# Patient Record
Sex: FEMALE | Race: WHITE | Employment: OTHER | URBAN - METROPOLITAN AREA
[De-identification: names, ages, dates, MRNs, and addresses within clinical notes are randomized per-mention and may not be internally consistent; named-entity substitution may affect disease eponyms.]

---

## 2017-01-05 ENCOUNTER — OFFICE VISIT (OUTPATIENT)
Dept: INTERNAL MEDICINE CLINIC | Age: 72
End: 2017-01-05

## 2017-01-05 VITALS
HEIGHT: 64 IN | HEART RATE: 68 BPM | BODY MASS INDEX: 28.68 KG/M2 | WEIGHT: 168 LBS | TEMPERATURE: 97.9 F | RESPIRATION RATE: 16 BRPM | OXYGEN SATURATION: 98 % | SYSTOLIC BLOOD PRESSURE: 148 MMHG | DIASTOLIC BLOOD PRESSURE: 88 MMHG

## 2017-01-05 DIAGNOSIS — I10 ESSENTIAL HYPERTENSION: Primary | ICD-10-CM

## 2017-01-05 RX ORDER — LOSARTAN POTASSIUM 100 MG/1
100 TABLET ORAL DAILY
Qty: 30 TAB | Refills: 2 | Status: SHIPPED | OUTPATIENT
Start: 2017-01-05 | End: 2017-03-28 | Stop reason: SDUPTHER

## 2017-01-05 NOTE — MR AVS SNAPSHOT
Visit Information Date & Time Provider Department Dept. Phone Encounter #  
 1/5/2017 10:00 AM [facility] [phone] Your Appointments 1/10/2017 11:00 AM  
6 MONTH with Roger Weiss MD  
[facility] Gynecologic On[facility]) Appt Note: 6 month check [phone]  
[phone]  
  
    
 2/7/2017 10:40 AM  
ROUTINE CARE with MD Jagdeep Guevara [facility]) Appt Note: 3m fu  
 [address], [address] [place] [address]  
[address], [phone] Upcoming Health Maintenance Date Due FOBT Q 1 YEAR AGE 50-75 11/21/1997 OSTEOPOROSIS SCREENING (DEXA) 11/21/2012 Pneumococcal 65+ High/Highest Risk (2 of 2 - PCV13) 11/22/2014 MEDICARE YEARLY EXAM 9/25/2016 GLAUCOMA SCREENING Q2Y 1/1/2017 BREAST CANCER SCRN MAMMOGRAM 10/7/2018 DTaP/Tdap/Td series (2 - Td) 8/12/2024 Allergies as of 1/5/2017  Review Complete On: 1/5/2017 By: Suhas Millard NP Severity Noted Reaction Type Reactions Lisinopril Medium 01/05/2017   Side Effect Cough Simvastatin Medium 01/05/2017   Side Effect Myalgia Codeine  11/02/2011   Systemic Other (comments) Unable to function Lidocaine  11/02/2011   Systemic Hives Current Immunizations  Reviewed on 10/13/2016 Name Date Influenza Vaccine 10/8/2016, 9/18/2012 Pneumococcal Polysaccharide (PPSV-23) 11/22/2013 Tdap 8/12/2014 Not reviewed this visit You Were Diagnosed With   
  
 Codes Comments Essential hypertension    -  Primary ICD-10-CM: I10 
ICD-9-CM: 401.9 Vitals BP Pulse Temp Resp Height(growth percentile) Weight(growth percentile) 148/88 68 97.9 °F (36.6 °C) (Oral) 16 5' 4\" (1.626 m) 168 lb (76.2 kg) SpO2 BMI OB Status Smoking Status 98% 28.84 kg/m2 Hysterectomy Never Smoker Vitals History BMI and BSA Data Body Mass Index Body Surface Area  
 28.84 kg/m 2 1.85 m 2 Preferred Pharmacy Pharmacy Name Phone [id number] Meier [phone] Your Updated Medication List  
  
   
This list is accurate as of: 1/5/17 11:08 AM.  Always use your most recent med list.  
  
  
  
  
 CO Q-10 100 mg capsule Generic drug:  co-enzyme Q-10 Take 300 mg by mouth daily. escitalopram oxalate 10 mg tablet Commonly known as:  Shan Barrs TAKE ONE TABLET BY MOUTH DAILY  
  
 * ESTRACE 0.01 % (0.1 mg/gram) vaginal cream  
Generic drug:  estradiol Insert 2 g into vagina every Monday and Friday. * estradiol 1 mg tablet Commonly known as:  ESTRACE  
TAKE ONE TABLET BY MOUTH DAILY FISH OIL CONCENTRATE PO Take  by mouth.  
  
 losartan 100 mg tablet Commonly known as:  COZAAR Take 1 Tab by mouth daily. multivitamin tablet Commonly known as:  ONE A DAY Take 1 tablet by mouth daily. VITAMIN C 500 mg tablet Generic drug:  ascorbic acid (vitamin C) Take  by mouth. * Notice: This list has 2 medication(s) that are the same as other medications prescribed for you. Read the directions carefully, and ask your doctor or other care provider to review them with you. Prescriptions Sent to Pharmacy Refills  
 losartan (COZAAR) 100 mg tablet 2 Sig: Take 1 Tab by mouth daily. Class: Normal  
 Pharmacy: [facility] #: [phone] Route: Oral  
  
Introducing [facility]! Dear Thomas Rivas: Thank you for requesting a [employer] account. Our records indicate that you already have an active [employer] account.   You can access your account anytime at [email or web address] Did you know that you can access your hospital and ER discharge instructions at any time in [employer]? You can also review all of your test results from your hospital stay or ER visit. Additional Information If you have questions, please visit the Frequently Asked Questions section of the [employer] website at [email or web address] Remember, [employer] is NOT to be used for urgent needs. For medical emergencies, dial 911. Now available from your iPhone and Android! Please provide this summary of care documentation to your next provider. Your primary care clinician is listed as Marquez Owen. If you have any questions after today's visit, please call [phone].

## 2017-01-05 NOTE — PROGRESS NOTES
74 yo female reports since being on started on Lisinopril Oct 21, she has had a rash on tops of proximal thighs and upper abd and a dry cough. The rash has resolved with use of only OTC moisturizing lotion. The cough is dry and most prevalent at night. She resumed taking her allergy medications a few days ago. She is careful with salt/sodium. PE: WNWD WF with intermittent dry cough   Repeat BP w/ large cough - 148/88   Heart - RRR   Lungs - clear   Skin - upper anterior thighs - dry skin    Imp: HTN   Possible Side Effects from Lisinopril - cough   Dry Skin    Plan: Stop Lisinopril   Start Losartan 100 mg    F/u with Dr. Cindy Tellez 2/7 as scheduled  ________________________  Expected course of current diagnosed problem(s) as well as expected progression and possible complications, and desired follow up with provider are discussed with patient. Patient is encouraged to be back in touch with any questions or concerns. Patient expresses understanding of plan of care. Patient is given AVS which includes diagnoses, current medications, vitals.

## 2017-01-05 NOTE — PROGRESS NOTES
1. Have you been to the ER, urgent care clinic since your last visit? Hospitalized since your last visit? No    2. Have you seen or consulted any other health care providers outside of the [address] [place] [address] since your last visit? Include any pap smears or colon screening.  No  Chief Complaint   Patient presents with    Hypertension     dry cough from lisinopril     Rash     upper thighs since starting lisinopril

## 2017-01-10 ENCOUNTER — OFFICE VISIT (OUTPATIENT)
Dept: GYNECOLOGY | Age: 72
End: 2017-01-10

## 2017-01-10 VITALS
WEIGHT: 170.6 LBS | SYSTOLIC BLOOD PRESSURE: 176 MMHG | HEIGHT: 64 IN | DIASTOLIC BLOOD PRESSURE: 87 MMHG | BODY MASS INDEX: 29.12 KG/M2 | HEART RATE: 74 BPM

## 2017-01-10 DIAGNOSIS — C56.1 OVARIAN CANCER, RIGHT (HCC): Primary | ICD-10-CM

## 2017-01-10 NOTE — PROGRESS NOTES
[address] [place] [address] [address], [address] [facility]  [phone]  MD Jluis Vale MD  Office Visit    Patient ID:  Name: Luis He  MRN: [id number]  : 1945/71 y.o. Visit date: 1/10/2017    INTERVAL HISTORY:  Luis He is a  female with a diagnosis of stage IC, grade 3, ovarian carcinoma (clear cell). She is s/p surgical resection and has now completed 6 cycles of Taxol and Carboplatin chemotherapy. She tolerated chemotherapy quite well. Her post-treatment PET/CT in 2013 showed no evidence of residual or metastatic disease. She presents today for routine surveillance. She is doing well and is without complaints. She denies any vaginal bleeding or discharge, any pelvic or abdominal pain, or any changes in her bowel or bladder habits. Her most recent CA-125 in 2016 was normal.  Her lastt CT of the abdomen/pelvis in 2015 showed no evidence of disease. PMH:  Past Medical History   Diagnosis Date    Adopted     BRCA negative 2013     Dr. Ivette Vines Cervical arthritis     History of ovarian cancer     Hypercholesterolemia     Hypertension     Other anxiety states     Other ill-defined conditions(189.08) 2010     BOWEL OBSTRUCTION  ? IMPACTION    Unspecified adverse effect of anesthesia      TREMORS AFTERWARDS     PSH:  Past Surgical History   Procedure Laterality Date    Hx cholecystectomy      Hx tonsillectomy       AS CHILD    Hx wisdom teeth extraction      Hx tubal ligation      Hx dilation and curettage      Hx gyn  13     RSO    Hx gyn  13     TLH/LSO, omentectomy, lymph node dissection     SOC:  Social History     Social History    Marital status:      Spouse name: N/A    Number of children: N/A    Years of education: N/A     Occupational History    Banking Retired     Social History Main Topics    Smoking status: Never Smoker    Smokeless tobacco: Never Used    Alcohol use 6.0 oz/week     10 Glasses of wine per week      Comment: 1-2 glasses nightly    Drug use: No    Sexual activity: No     Other Topics Concern    Exercise Yes     walking 1/2 - 1 mile daily     Social History Narrative    . Adopted by great aunt and uncle. Retired from [employer]. Two adult sons, one currently at home with her with his wife and grandchild. Family History  Family History   Problem Relation Age of Onset    Hypertension Father     Stroke Father     Diabetes Father     Parkinsonism Mother     Hypertension Mother     Diabetes Other      Maternal great aunt    Heart Disease Other      Maternal great aunt    Drug Abuse Son      Medications:  Current Outpatient Prescriptions on File Prior to Visit   Medication Sig Dispense Refill    losartan (COZAAR) 100 mg tablet Take 1 Tab by mouth daily. 30 Tab 2    estradiol (ESTRACE) 1 mg tablet TAKE ONE TABLET BY MOUTH DAILY 60 Tab 0    escitalopram oxalate (LEXAPRO) 10 mg tablet TAKE ONE TABLET BY MOUTH DAILY 90 Tab 0    multivitamin (ONE A DAY) tablet Take 1 tablet by mouth daily.  estradiol (ESTRACE) 0.01 % (0.1 mg/gram) vaginal cream Insert 2 g into vagina every Monday and Friday.  ascorbic acid (VITAMIN C) 500 mg tablet Take  by mouth.  co-enzyme Q-10 (CO Q-10) 100 mg capsule Take 300 mg by mouth daily. No current facility-administered medications on file prior to visit. Allergies: Allergies   Allergen Reactions    Lisinopril Cough    Simvastatin Myalgia    Codeine Other (comments)     Unable to function    Lidocaine Hives       ROS:  Negative except for that mentioned above.     OBJECTIVE:    PHYSICAL EXAM  VITAL SIGNS: Visit Vitals    /87 (BP 1 Location: Left arm, BP Patient Position: Sitting)    Pulse 74    Ht 5' 4\" (1.626 m)    Wt 170 lb 9.6 oz (77.4 kg)    BMI 29.28 kg/m2      GENERAL ZOILA: in no apparent distress and well developed and well nourished   HEENT: within normal limits   RESPIRATORY: lungs clear to auscultation and percussion   CARDIOVASC: Regular rate and rhythm without [facility]   GASTROINT: soft, non-tender, without masses or organomegaly   MUSCULOSKEL: no joint tenderness, deformity or swelling   EXTREMITIES: extremities normal, atraumatic, no cyanosis or edema   PELVIC: Normal external genitalia. Normal vagina. Uterus, cervix, and adnexa surgically absent. No masses or nodularity. RECTAL: Exam deferred. JAH SURVEY: Cervical, supraclavicular, and axillary nodes normal.   NEURO: Grossly normal       Lab Results   Component Value Date/Time    WBC 9.9 10/21/2016 12:44 PM    HGB 14.4 10/21/2016 12:44 PM    HCT 43.5 10/21/2016 12:44 PM    PLATELET 774 74/52/6186 12:44 PM    MCV 91 10/21/2016 12:44 PM     Lab Results   Component Value Date/Time    Sodium 138 10/21/2016 12:44 PM    Potassium 4.6 10/21/2016 12:44 PM    Chloride 96 10/21/2016 12:44 PM    CO2 24 10/21/2016 12:44 PM    Anion gap 4 09/25/2014 03:28 PM    Glucose 81 10/21/2016 12:44 PM    BUN 15 10/21/2016 12:44 PM    Creatinine 0.68 10/21/2016 12:44 PM    BUN/Creatinine ratio 22 10/21/2016 12:44 PM    GFR est  10/21/2016 12:44 PM    GFR est non-AA 90 10/21/2016 12:44 PM    Calcium 9.7 10/21/2016 12:44 PM       CT of abdomen/pelvis (7/16/15)  Limited images of the lung bases demonstrate a 3 mm nodule in the right    lower lobe (series 3, image 6) that is stable dating back to least a    December 2009. There is no new lung nodule, mass, or consolidation. The    heart size is normal. There is no pericardial or pleural effusion.     The liver, spleen, pancreas, and adrenal glands are normal. The kidneys are    symmetric without hydronephrosis.  The gall bladder is surgically absent    without intra- or extra-hepatic biliary dilatation.        There are no dilated bowel loops.  The appendix is normal.  There are no    enlarged lymph nodes.  There is no free fluid or free air.     The urinary bladder is normal.  There is no pelvic mass.  The bony    structures are age-appropriate. There is no aggressive blastic or lytic    osseous lesion.          IMPRESSION:    Stable exam with no evidence for disease recurrence or metastatic disease in    the abdomen or pelvis. IMPRESSION AND PLAN:  Dianne Aldrich has a diagnosis of stage IC, grade 3, ovarian CA (clear cell), managed with surgical resection followed by adjuvant Taxol and Carboplatin chemotherapy. She has no evidence of disease based upon today's examination. A pap smear was performed recently by her regular gynecologist and was normal.  We will repeat a CA-125 today. I will plan to see her back in 6 months for continued surveillance.         Peggy Pedro MD  1/10/2017/10:56 AM

## 2017-01-11 LAB — CANCER AG125 SERPL-ACNC: 26.1 U/ML (ref 0–38.1)

## 2017-01-26 RX ORDER — ESTRADIOL 1 MG/1
TABLET ORAL
Qty: 60 TAB | Refills: 0 | Status: SHIPPED | OUTPATIENT
Start: 2017-01-26 | End: 2017-03-22 | Stop reason: SDUPTHER

## 2017-01-27 RX ORDER — LISINOPRIL 10 MG/1
TABLET ORAL
Qty: 30 TAB | Refills: 6 | OUTPATIENT
Start: 2017-01-27

## 2017-02-07 ENCOUNTER — HOSPITAL ENCOUNTER (OUTPATIENT)
Dept: GENERAL RADIOLOGY | Age: 72
Discharge: HOME | End: 2017-02-07
Payer: COMMERCIAL

## 2017-02-07 ENCOUNTER — OFFICE VISIT (OUTPATIENT)
Dept: INTERNAL MEDICINE CLINIC | Age: 72
End: 2017-02-07

## 2017-02-07 VITALS
TEMPERATURE: 97.4 F | HEART RATE: 68 BPM | DIASTOLIC BLOOD PRESSURE: 64 MMHG | HEIGHT: 64 IN | SYSTOLIC BLOOD PRESSURE: 112 MMHG | WEIGHT: 169 LBS | BODY MASS INDEX: 28.85 KG/M2 | RESPIRATION RATE: 16 BRPM | OXYGEN SATURATION: 97 %

## 2017-02-07 DIAGNOSIS — M25.552 BILATERAL HIP PAIN: ICD-10-CM

## 2017-02-07 DIAGNOSIS — E78.00 ELEVATED LDL CHOLESTEROL LEVEL: ICD-10-CM

## 2017-02-07 DIAGNOSIS — G89.29 CHRONIC NECK PAIN: ICD-10-CM

## 2017-02-07 DIAGNOSIS — M25.551 BILATERAL HIP PAIN: ICD-10-CM

## 2017-02-07 DIAGNOSIS — I10 ESSENTIAL HYPERTENSION: Primary | ICD-10-CM

## 2017-02-07 DIAGNOSIS — M54.2 CHRONIC NECK PAIN: ICD-10-CM

## 2017-02-07 DIAGNOSIS — F41.9 ANXIETY: ICD-10-CM

## 2017-02-07 PROCEDURE — 72050 X-RAY EXAM NECK SPINE 4/5VWS: CPT

## 2017-02-07 PROCEDURE — 73521 X-RAY EXAM HIPS BI 2 VIEWS: CPT

## 2017-02-07 RX ORDER — CETIRIZINE HCL 10 MG
10 TABLET ORAL DAILY
COMMUNITY
End: 2018-08-14

## 2017-02-07 NOTE — PROGRESS NOTES
Called pt to review her neck and hip films.  She has DJD as suspected.  She prefers not to see an orthopedic or participate in PT at this time.  I advised more regular exercise to prevent stiffness.

## 2017-02-07 NOTE — PROGRESS NOTES
HPI:  Najma Troy is a 73y.o. year old female who returns to clinic today for routine follow up appointment to discuss the issues below:    Here for routine f/u of anxiety and htn. Since last visit she was initiated on antihypertensive by Ash Jones.  Had cough with lisinopril but has tolerated losartan well. She reports her anxiety as under good control on Lexapro. She recently found out her 79 yo biological mother has Parkinson's and associated dementia. Her biological Father had a stroke in his 62s. Her biological sister is well. Najma Troy would like her cholesterol rechecked today, but historically couldn't tolerate a statin. She generally eats a heart healthy diet. Breakfast - cereal  Lunch - sandwich or salad  Dinner - cooks at home, out on weekends. She is not exercising due to pain in her joints - hips, neck, shoulders, base of thumbs  Neck is most significant. She has no upper ext weakness or pain in arms  No h/o prior w/u or evaluation. Was taking ibuprofen a couple times per day until she noted her high blood pressure readings last Fall. She prefers to avoid new medication as she typically doesn't do well with new meds. Prior to Admission medications    Medication Sig Start Date End Date Taking? Authorizing Provider   cetirizine (ZYRTEC) 10 mg tablet Take  by mouth. Yes Historical Provider   estradiol (ESTRACE) 1 mg tablet TAKE ONE TABLET BY MOUTH DAILY 1/26/17  Yes Idella Hammans., MD   losartan (COZAAR) 100 mg tablet Take 1 Tab by mouth daily. 1/5/17  Yes Jody Regan NP   escitalopram oxalate (LEXAPRO) 10 mg tablet TAKE ONE TABLET BY MOUTH DAILY 11/17/16  Yes [address] [place] [address] Street, MD   multivitamin (ONE A DAY) tablet Take 1 tablet by mouth daily. Yes Historical Provider   estradiol (ESTRACE) 0.01 % (0.1 mg/gram) vaginal cream Insert 2 g into vagina every Monday and Friday. Yes Historical Provider   ascorbic acid (VITAMIN C) 500 mg tablet Take  by mouth.    Yes Historical Provider   co-enzyme Q-10 (CO Q-10) 100 mg capsule Take 300 mg by mouth daily. Yes Historical Provider          Allergies   Allergen Reactions    Lisinopril Cough    Simvastatin Myalgia    Codeine Other (comments)     Unable to function    Lidocaine Hives           Review of Systems   Constitutional: Negative for chills, fever and malaise/fatigue. HENT: Negative for congestion. Respiratory: Negative for cough, shortness of breath and wheezing. Cardiovascular: Negative for chest pain, palpitations and leg swelling. Gastrointestinal: Negative for abdominal pain, blood in stool and heartburn. Musculoskeletal: Positive for joint pain and neck pain. Negative for falls and myalgias. Neurological: Negative for dizziness and headaches. Physical Exam   Constitutional: She appears well-nourished. Neck: Carotid bruit is not present. Cardiovascular: Normal rate, regular rhythm and normal heart sounds. No murmur heard. Pulses:       Carotid pulses are 2+ on the right side, and 2+ on the left side. Pulmonary/Chest: Effort normal and breath sounds normal.   Abdominal: Soft. Bowel sounds are normal. There is no hepatosplenomegaly. There is no tenderness. Musculoskeletal: She exhibits no edema. Right hip: She exhibits normal range of motion and no deformity. Left hip: She exhibits normal range of motion and no deformity. Cervical back: She exhibits pain. She exhibits normal range of motion and no spasm. Legs:  Psychiatric: She has a normal mood and affect. Her behavior is normal.         Visit Vitals    /64 (BP 1 Location: Left arm, BP Patient Position: Sitting)    Pulse 68    Temp 97.4 °F (36.3 °C) (Oral)    Resp 16    Ht 5' 4\" (1.626 m)    Wt 169 lb (76.7 kg)    SpO2 97%    BMI 29.01 kg/m2         Assessment & Plan:  Jumana Livingston was seen today for hypertension and joint pain.     Diagnoses and all orders for this visit:    Essential hypertension  I evaluated and recommended to continue current doses of medications.      Elevated LDL cholesterol level  Recheck fasting lipids. Continue lifestyle management.   -     LIPID PANEL    Anxiety  I evaluated and recommended to continue current doses of Lexapro     Chronic neck pain  -     XR SPINE CERV 4 OR 5 V; Future  Bilateral hip pain  -     XR HIP LT W OR WO PELV 2-3 VWS; Future  -     XR HIP RT W OR WO PELV 2-3 VWS; Future    Discussed pathophysiology of suspected osteoarthritis. Recommend Tylenol as initial therapy with use of no more than 3,000 mg per day. Advised she start with one extra strength tablet twice daily as needed. Encouraged her to increase her activity level to promote joint mobility and reduce stiffness- discussed activities such as swimming (she avoids pools) and the stationary bike. Ok to use ibuprofen on a prn basis while closely monitoring her blood pressure. Follow-up Disposition:  Return in about 6 months (around 8/7/2017), or if symptoms worsen or fail to improve. Advised her to call back or return to office if symptoms worsen/change/persist.  Discussed expected course/resolution/complications of diagnosis in detail with patient. Medication risks/benefits/costs/interactions/alternatives discussed with patient. She was given an after visit summary which includes diagnoses, current medications, & vitals. She expressed understanding with the diagnosis and plan.

## 2017-02-07 NOTE — PROGRESS NOTES
Chief Complaint   Patient presents with    Hypertension    Joint Pain     gotten worse in last 6 months      1. Have you been to the ER, urgent care clinic since your last visit? Hospitalized since your last visit? No    2. Have you seen or consulted any other health care providers outside of the [address] [place] [address] since your last visit? Include any pap smears or colon screening.  No

## 2017-02-07 NOTE — PATIENT INSTRUCTIONS
Arthritis: Care Instructions  Your Care Instructions  Arthritis, also called osteoarthritis, is a breakdown of the cartilage that cushions your joints. When the cartilage wears down, your bones rub against each other. This causes pain and stiffness. Many people have some arthritis as they age. Arthritis most often affects the joints of the spine, hands, hips, knees, or feet. You can take simple measures to protect your joints, ease your pain, and help you stay active. Follow-up care is a key part of your treatment and safety. Be sure to make and go to all appointments, and call your doctor if you are having problems. It's also a good idea to know your test results and keep a list of the medicines you take. How can you care for yourself at home? · Stay at a healthy weight. Being overweight puts extra strain on your joints. · Talk to your doctor or physical therapist about exercises that will help ease joint pain. ¨ Stretch. You may enjoy gentle forms of yoga to help keep your joints and muscles flexible. ¨ Walk instead of jog. Other types of exercise that are less stressful on the joints include riding a bicycle, swimming, or water exercise. ¨ Lift weights. Strong muscles help reduce stress on your joints. Stronger thigh muscles, for example, take some of the stress off of the knees and hips. Learn the right way to lift weights so you do not make joint pain worse. · Take your medicines exactly as prescribed. Call your doctor if you think you are having a problem with your medicine. · Take pain medicines exactly as directed. ¨ If the doctor gave you a prescription medicine for pain, take it as prescribed. ¨ If you are not taking a prescription pain medicine, ask your doctor if you can take an over-the-counter medicine. · Use a cane, crutch, walker, or another device if you need help to get around. These can help rest your joints.  You also can use other things to make life easier, such as a higher toilet seat and padded handles on kitchen utensils. · Do not sit in low chairs, which can make it hard to get up. · Put heat or cold on your sore joints as needed. Use whichever helps you most. You also can take turns with hot and cold packs. ¨ Apply heat 2 or 3 times a day for 20 to 30 minutes--using a heating pad, hot shower, or hot pack--to relieve pain and stiffness. ¨ Put ice or a cold pack on your sore joint for 10 to 20 minutes at a time. Put a thin cloth between the ice and your skin. When should you call for help? Call your doctor now or seek immediate medical care if:  · You have sudden swelling, warmth, or pain in any joint. · You have joint pain and a fever or rash. · You have such bad pain that you cannot use a joint. Watch closely for changes in your health, and be sure to contact your doctor if:  · You have mild joint symptoms that continue even with more than 6 weeks of care at home. · You have stomach pain or other problems with your medicine. Where can you learn more? Go to http://iglesia-khoi.info/. Enter N882 in the search box to learn more about \"Arthritis: Care Instructions. \"  Current as of: February 24, 2016  Content Version: 11.1  © 0338-2392 [employer]. Care instructions adapted under license by [employer] (which disclaims liability or warranty for this information). If you have questions about a medical condition or this instruction, always ask your healthcare professional. [place] [address] any warranty or liability for your use of this information.

## 2017-02-07 NOTE — MR AVS SNAPSHOT
Visit Information Date & Time Provider Department Dept. Phone Encounter #  
 2/7/2017 10:40 AM MD [facility] [phone] Follow-up Instructions Return in about 6 months (around 8/7/2017), or if symptoms worsen or fail to improve. Your Appointments 7/11/2017 10:15 AM  
6 MONTH with Cherry Barakat MD  
[facility] Gynecologic Oncology [address]) Appt Note: 6 month check [address] [phone] [id number] Upcoming Health Maintenance Date Due FOBT Q 1 YEAR AGE 50-75 11/21/1997 OSTEOPOROSIS SCREENING (DEXA) 11/21/2012 Pneumococcal 65+ High/Highest Risk (2 of 2 - PCV13) 11/22/2014 MEDICARE YEARLY EXAM 9/25/2016 GLAUCOMA SCREENING Q2Y 1/1/2017 PAP AKA CERVICAL CYTOLOGY 6/14/2017 BREAST CANCER SCRN MAMMOGRAM 10/7/2018 DTaP/Tdap/Td series (2 - Td) 8/12/2024 Allergies as of 2/7/2017  Review Complete On: 2/7/2017 By: [address], MD  
  
 Severity Noted Reaction Type Reactions Lisinopril Medium 01/05/2017   Side Effect Cough Simvastatin Medium 01/05/2017   Side Effect Myalgia Codeine  11/02/2011   Systemic Other (comments) Unable to function Lidocaine  11/02/2011   Systemic Hives Current Immunizations  Reviewed on 10/13/2016 Name Date Influenza Vaccine 10/8/2016, 9/18/2012 Pneumococcal Polysaccharide (PPSV-23) 11/22/2013 Tdap 8/12/2014 Not reviewed this visit You Were Diagnosed With   
  
 Codes Comments Essential hypertension    -  Primary ICD-10-CM: I10 
ICD-9-CM: 401.9 Elevated LDL cholesterol level     ICD-10-CM: E78.00 ICD-9-CM: 272.0 Chronic neck pain     ICD-10-CM: M54.2, G89.29 ICD-9-CM: 723.1, 338.29 Bilateral hip pain     ICD-10-CM: M25.551, M25.552 ICD-9-CM: 719.45 Anxiety     ICD-10-CM: F41.9 ICD-9-CM: 300.00 Vitals BP Pulse Temp Resp Height(growth percentile) Weight(growth percentile) 112/64 (BP 1 Location: Left arm, BP Patient Position: Sitting) 68 97.4 °F (36.3 °C) (Oral) 16 5' 4\" (1.626 m) 169 lb (76.7 kg) SpO2 BMI OB Status Smoking Status 97% 29.01 kg/m2 Hysterectomy Never Smoker Vitals History BMI and BSA Data Body Mass Index Body Surface Area  
 29.01 kg/m 2 1.86 m 2 Preferred Pharmacy Pharmacy Name Phone [id number] [phone] Your Updated Medication List  
  
   
This list is accurate as of: 2/7/17 12:08 PM.  Always use your most recent med list.  
  
  
  
  
 cetirizine 10 mg tablet Commonly known as:  ZYRTEC Take  by mouth. CO Q-10 100 mg capsule Generic drug:  co-enzyme Q-10 Take 300 mg by mouth daily. escitalopram oxalate 10 mg tablet Commonly known as:  Wilbert Heir TAKE ONE TABLET BY MOUTH DAILY  
  
 * ESTRACE 0.01 % (0.1 mg/gram) vaginal cream  
Generic drug:  estradiol Insert 2 g into vagina every Monday and Friday. * estradiol 1 mg tablet Commonly known as:  ESTRACE  
TAKE ONE TABLET BY MOUTH DAILY losartan 100 mg tablet Commonly known as:  COZAAR Take 1 Tab by mouth daily. multivitamin tablet Commonly known as:  ONE A DAY Take 1 tablet by mouth daily. VITAMIN C 500 mg tablet Generic drug:  ascorbic acid (vitamin C) Take  by mouth. * Notice: This list has 2 medication(s) that are the same as other medications prescribed for you. Read the directions carefully, and ask your doctor or other care provider to review them with you. We Performed the Following LIPID PANEL [20964 CPT(R)] Follow-up Instructions Return in about 6 months (around 8/7/2017), or if symptoms worsen or fail to improve. To-Do List   
 02/07/2017   Imaging:  XR HIP RT W OR WO PELV 2-3 VWS   
  
 02/08/2017 Imaging:  XR HIP LT W OR WO PELV 2-3 VWS   
  
 02/08/2017 Imaging:  XR SPINE CERV 4 OR 5 V Patient Instructions Arthritis: Care Instructions Your Care Instructions Arthritis, also called osteoarthritis, is a breakdown of the cartilage that cushions your joints. When the cartilage wears down, your bones rub against each other. This causes pain and stiffness. Many people have some arthritis as they age. Arthritis most often affects the joints of the spine, hands, hips, knees, or feet. You can take simple measures to protect your joints, ease your pain, and help you stay active. Follow-up care is a key part of your treatment and safety. Be sure to make and go to all appointments, and call your doctor if you are having problems. It's also a good idea to know your test results and keep a list of the medicines you take. How can you care for yourself at home? · Stay at a healthy weight. Being overweight puts extra strain on your joints. · Talk to your doctor or physical therapist about exercises that will help ease joint pain. ¨ Stretch. You may enjoy gentle forms of yoga to help keep your joints and muscles flexible. ¨ Walk instead of jog. Other types of exercise that are less stressful on the joints include riding a bicycle, swimming, or water exercise. ¨ Lift weights. Strong muscles help reduce stress on your joints. Stronger thigh muscles, for example, take some of the stress off of the knees and hips. Learn the right way to lift weights so you do not make joint pain worse. · Take your medicines exactly as prescribed. Call your doctor if you think you are having a problem with your medicine. · Take pain medicines exactly as directed. ¨ If the doctor gave you a prescription medicine for pain, take it as prescribed. ¨ If you are not taking a prescription pain medicine, ask your doctor if you can take an over-the-counter medicine. · Use a cane, crutch, walker, or another device if you need help to get around. These can help rest your joints. You also can use other things to make life easier, such as a higher toilet seat and padded handles on kitchen utensils. · Do not sit in low chairs, which can make it hard to get up. · Put heat or cold on your sore joints as needed. Use whichever helps you most. You also can take turns with hot and cold packs. ¨ Apply heat 2 or 3 times a day for 20 to 30 minutesusing a heating pad, hot shower, or hot packto relieve pain and stiffness. ¨ Put ice or a cold pack on your sore joint for 10 to 20 minutes at a time. Put a thin cloth between the ice and your skin. When should you call for help? Call your doctor now or seek immediate medical care if: 
· You have sudden swelling, warmth, or pain in any joint. · You have joint pain and a fever or rash. · You have such bad pain that you cannot use a joint. Watch closely for changes in your health, and be sure to contact your doctor if: 
· You have mild joint symptoms that continue even with more than 6 weeks of care at home. · You have stomach pain or other problems with your medicine. Where can you learn more? Go to http://iglesia-khoi.info/. Enter Q387 in the search box to learn more about \"Arthritis: Care Instructions. \" Current as of: February 24, 2016 Content Version: 11.1 © 7360-3470 [employer]. Care instructions adapted under license by [employer] (which disclaims liability or warranty for this information). If you have questions about a medical condition or this instruction, always ask your healthcare professional. [address] any warranty or liability for your use of this information. Introducing [facility] [place] [facility]! Dear Mark Moreno: Thank you for requesting a [employer] account. Our records indicate that you already have an active [employer] account.   You can access your account anytime at [email or web address] Did you know that you can access your hospital and ER discharge instructions at any time in [employer]? You can also review all of your test results from your hospital stay or ER visit. Additional Information If you have questions, please visit the Frequently Asked Questions section of the [employer] website at [email or web address] Remember, D[employer]ios is NOT to be used for urgent needs. For medical emergencies, dial 911. Now available from your iPhone and Android! Please provide this summary of care documentation to your next provider. Your primary care clinician is listed as [address]. If you have any questions after today's visit, please call [phone].

## 2017-02-08 LAB
CHOLEST SERPL-MCNC: 245 MG/DL (ref 100–199)
HDLC SERPL-MCNC: 81 MG/DL
INTERPRETATION, 910389: NORMAL
LDLC SERPL CALC-MCNC: 130 MG/DL (ref 0–99)
TRIGL SERPL-MCNC: 169 MG/DL (ref 0–149)
VLDLC SERPL CALC-MCNC: 34 MG/DL (ref 5–40)

## 2017-02-13 ENCOUNTER — DOCUMENTATION ONLY (OUTPATIENT)
Dept: GYNECOLOGY | Age: 72
End: 2017-02-13

## 2017-02-13 ENCOUNTER — TELEPHONE (OUTPATIENT)
Dept: GYNECOLOGY | Age: 72
End: 2017-02-13

## 2017-02-13 DIAGNOSIS — C56.1 MALIGNANT NEOPLASM OF RIGHT OVARY (HCC): Primary | ICD-10-CM

## 2017-02-13 NOTE — TELEPHONE ENCOUNTER
Pt would like order for CA-125 sent to [facility] at [facility]. Order faxed as requested.

## 2017-02-14 NOTE — PROGRESS NOTES
The following Online Dealer message was sent to patient:    Your LDL cholesterol remains elevated and your triglyceride levels are now elevated as well. Some notes about cholesterol below. Based on your numbers a cholesterol lowering medication should be considered. I know that you had side effects with simvastatin in the past.  Traditionally, simvastatin tends to cause the most problems. If you have not tried others and are willing to, please let me know.

## 2017-02-15 LAB — CANCER AG125 SERPL-ACNC: 13.9 U/ML (ref 0–38.1)

## 2017-02-16 DIAGNOSIS — E78.2 HYPERCHOLESTEROLEMIA WITH HYPERTRIGLYCERIDEMIA: Primary | ICD-10-CM

## 2017-02-16 RX ORDER — PRAVASTATIN SODIUM 20 MG/1
20 TABLET ORAL
Qty: 90 TAB | Refills: 0 | Status: SHIPPED | OUTPATIENT
Start: 2017-02-16 | End: 2017-02-24 | Stop reason: SDUPTHER

## 2017-02-24 RX ORDER — PRAVASTATIN SODIUM 20 MG/1
20 TABLET ORAL
Qty: 90 TAB | Refills: 0 | Status: SHIPPED | OUTPATIENT
Start: 2017-02-24 | End: 2017-05-15 | Stop reason: SDUPTHER

## 2017-03-22 RX ORDER — ESTRADIOL 1 MG/1
TABLET ORAL
Qty: 60 TAB | Refills: 0 | Status: SHIPPED | OUTPATIENT
Start: 2017-03-22 | End: 2017-05-31 | Stop reason: SDUPTHER

## 2017-03-28 DIAGNOSIS — I10 ESSENTIAL HYPERTENSION: ICD-10-CM

## 2017-03-28 RX ORDER — LOSARTAN POTASSIUM 100 MG/1
100 TABLET ORAL DAILY
Qty: 30 TAB | Refills: 5 | Status: SHIPPED | OUTPATIENT
Start: 2017-03-28 | End: 2017-08-31 | Stop reason: SDUPTHER

## 2017-03-28 NOTE — TELEPHONE ENCOUNTER
----- Message from Janet Veronica sent at 3/28/2017  1:22 PM EDT -----  Regarding: BRANDON Ricardo  Pt needs a refill on Losartan 100 mg call into [facility] [phone]

## 2017-03-28 NOTE — TELEPHONE ENCOUNTER
Last office visit 2/7/17  No upcomming appointments on file.  Patient not due for follow up until 8/7/17

## 2017-05-11 DIAGNOSIS — E78.2 HYPERCHOLESTEROLEMIA WITH HYPERTRIGLYCERIDEMIA: ICD-10-CM

## 2017-05-15 RX ORDER — PRAVASTATIN SODIUM 20 MG/1
TABLET ORAL
Qty: 90 TAB | Refills: 1 | Status: SHIPPED | OUTPATIENT
Start: 2017-05-15 | End: 2017-11-13 | Stop reason: SDUPTHER

## 2017-05-31 RX ORDER — ESTRADIOL 1 MG/1
TABLET ORAL
Qty: 30 TAB | Refills: 0 | Status: SHIPPED | OUTPATIENT
Start: 2017-05-31 | End: 2017-06-26 | Stop reason: SDUPTHER

## 2017-07-11 ENCOUNTER — OFFICE VISIT (OUTPATIENT)
Dept: GYNECOLOGY | Age: 72
End: 2017-07-11

## 2017-07-11 VITALS
HEART RATE: 62 BPM | SYSTOLIC BLOOD PRESSURE: 160 MMHG | BODY MASS INDEX: 29.57 KG/M2 | HEIGHT: 64 IN | WEIGHT: 173.2 LBS | DIASTOLIC BLOOD PRESSURE: 85 MMHG

## 2017-07-11 DIAGNOSIS — C56.1 OVARIAN CANCER, RIGHT (HCC): Primary | ICD-10-CM

## 2017-07-11 NOTE — PROGRESS NOTES
Six month check up, Patient states no abnormal spotting or bleeding. Patient states no questions or concerns for today's visit.

## 2017-07-11 NOTE — PROGRESS NOTES
[address] [place] [address] Mathias Moritz [address]18, [address] [facility]  [phone]  MD Alis Velazquez MD  Office Visit    Patient ID:  Name: Garth Ness  MRN: [id number]  : 1945/71 y.o. Visit date: 2017    INTERVAL HISTORY:  Garth Ness is a  female with a diagnosis of stage IC, grade 3, ovarian carcinoma (clear cell). She is s/p surgical resection and has now completed 6 cycles of Taxol and Carboplatin chemotherapy. She tolerated chemotherapy quite well. Her post-treatment PET/CT in 2013 showed no evidence of residual or metastatic disease. She presents today for routine surveillance. She is doing well and is without complaints. She denies any vaginal bleeding or discharge, any pelvic or abdominal pain, or any changes in her bowel or bladder habits. Her most recent CA-125 in 2017 was normal.  Her last CT of the abdomen/pelvis in 2015 showed no evidence of disease. PMH:  Past Medical History:   Diagnosis Date    Adopted     BRCA negative 2013    Dr. Carson Robison Cervical arthritis [facility])     History of ovarian cancer     Hypercholesterolemia     Hypertension     Other anxiety states     Other ill-defined conditions 2010    BOWEL OBSTRUCTION  ? IMPACTION    Unspecified adverse effect of anesthesia     TREMORS AFTERWARDS     PSH:  Past Surgical History:   Procedure Laterality Date    HX CHOLECYSTECTOMY      HX DILATION AND CURETTAGE      HX GYN  13    RSO    HX GYN  13    TLH/LSO, omentectomy, lymph node dissection    HX TONSILLECTOMY      AS CHILD    HX TUBAL LIGATION      HX WISDOM TEETH EXTRACTION       SOC:  Social History     Social History    Marital status:      Spouse name: N/A    Number of children: N/A    Years of education: N/A     Occupational History    Banking Retired     Social History Main Topics    Smoking status: Never Smoker    Smokeless tobacco: Never Used    Alcohol use 6.0 oz/week     10 Glasses of wine per week      Comment: 1-2 glasses nightly    Drug use: No    Sexual activity: No     Other Topics Concern    Exercise Yes     walking 1/2 - 1 mile daily     Social History Narrative    . Adopted by great aunt and uncle. Retired from [employer]. Two adult sons, one currently at home with her with his wife and grandchild. Family History  Family History   Problem Relation Age of Onset    Hypertension Father     Stroke Father     Diabetes Father     Parkinsonism Mother     Hypertension Mother     Diabetes Other      Maternal great aunt    Heart Disease Other      Maternal great aunt    Drug Abuse Son      Medications:  Current Outpatient Prescriptions on File Prior to Visit   Medication Sig Dispense Refill    estradiol (ESTRACE) 1 mg tablet TAKE ONE TABLET BY MOUTH DAILY 30 Tab 12    pravastatin (PRAVACHOL) 20 mg tablet TAKE ONE TABLET BY M0UTH EVERY NIGHT 90 Tab 1    losartan (COZAAR) 100 mg tablet Take 1 Tab by mouth daily. 30 Tab 5    escitalopram oxalate (LEXAPRO) 10 mg tablet TAKE 1 TABLET EVERY DAY 90 Tab 3    cetirizine (ZYRTEC) 10 mg tablet Take  by mouth.  multivitamin (ONE A DAY) tablet Take 1 tablet by mouth daily.  estradiol (ESTRACE) 0.01 % (0.1 mg/gram) vaginal cream Insert 2 g into vagina every Monday and Friday.  ascorbic acid (VITAMIN C) 500 mg tablet Take  by mouth.  co-enzyme Q-10 (CO Q-10) 100 mg capsule Take 300 mg by mouth daily. No current facility-administered medications on file prior to visit. Allergies: Allergies   Allergen Reactions    Lisinopril Cough    Simvastatin Myalgia    Codeine Other (comments)     Unable to function    Lidocaine Hives       ROS:  Negative except for that mentioned above.     OBJECTIVE:    PHYSICAL EXAM  VITAL SIGNS: Visit Vitals    /85 (BP 1 Location: Left arm, BP Patient Position: Sitting)    Pulse 62    Ht 5' 4.02\" (1.626 m)    Wt 78.6 kg (173 lb 3.2 oz)    BMI 29.71 kg/m2      GENERAL ZOILA: in no apparent distress and well developed and well nourished   HEENT: within normal limits   RESPIRATORY: lungs clear to auscultation and percussion   CARDIOVASC: Regular rate and rhythm without [facility]   GASTROINT: soft, non-tender, without masses or organomegaly   MUSCULOSKEL: no joint tenderness, deformity or swelling   EXTREMITIES: extremities normal, atraumatic, no cyanosis or edema   PELVIC: Normal external genitalia. Normal vagina. Uterus, cervix, and adnexa surgically absent. No masses or nodularity. RECTAL: Exam deferred. JAH SURVEY: Cervical, supraclavicular, and axillary nodes normal.   NEURO: Grossly normal       Lab Results   Component Value Date/Time    WBC 9.9 10/21/2016 12:44 PM    HGB 14.4 10/21/2016 12:44 PM    HCT 43.5 10/21/2016 12:44 PM    PLATELET 212 92/22/8564 12:44 PM    MCV 91 10/21/2016 12:44 PM     Lab Results   Component Value Date/Time    Sodium 138 10/21/2016 12:44 PM    Potassium 4.6 10/21/2016 12:44 PM    Chloride 96 10/21/2016 12:44 PM    CO2 24 10/21/2016 12:44 PM    Anion gap 4 09/25/2014 03:28 PM    Glucose 81 10/21/2016 12:44 PM    BUN 15 10/21/2016 12:44 PM    Creatinine 0.68 10/21/2016 12:44 PM    BUN/Creatinine ratio 22 10/21/2016 12:44 PM    GFR est  10/21/2016 12:44 PM    GFR est non-AA 90 10/21/2016 12:44 PM    Calcium 9.7 10/21/2016 12:44 PM       CT of abdomen/pelvis (7/16/15)  Limited images of the lung bases demonstrate a 3 mm nodule in the right    lower lobe (series 3, image 6) that is stable dating back to least a    December 2009. There is no new lung nodule, mass, or consolidation. The    heart size is normal. There is no pericardial or pleural effusion.     The liver, spleen, pancreas, and adrenal glands are normal. The kidneys are    symmetric without hydronephrosis.  The gall bladder is surgically absent    without intra- or extra-hepatic biliary dilatation.        There are no dilated bowel loops.  The appendix is normal.  There are no    enlarged lymph nodes.  There is no free fluid or free air.     The urinary bladder is normal.  There is no pelvic mass.  The bony    structures are age-appropriate. There is no aggressive blastic or lytic    osseous lesion.          IMPRESSION:    Stable exam with no evidence for disease recurrence or metastatic disease in    the abdomen or pelvis. IMPRESSION AND PLAN:  Ton Patel has a diagnosis of stage IC, grade 3, ovarian CA (clear cell), managed with surgical resection followed by adjuvant Taxol and Carboplatin chemotherapy. She has no evidence of disease based upon today's examination. I will plan to see her back in 6 months for continued surveillance.         Gloria Gomez MD  7/11/2017/10:56 AM

## 2017-07-12 LAB
ALBUMIN SERPL-MCNC: 4.3 G/DL (ref 3.6–4.8)
ALBUMIN/GLOB SERPL: 1.4 {RATIO} (ref 1.2–2.2)
ALP SERPL-CCNC: 75 IU/L (ref 39–117)
ALT SERPL-CCNC: 16 IU/L (ref 0–32)
AST SERPL-CCNC: 22 IU/L (ref 0–40)
BASOPHILS # BLD AUTO: 0.1 X10E3/UL (ref 0–0.2)
BASOPHILS NFR BLD AUTO: 1 %
BILIRUB SERPL-MCNC: 0.4 MG/DL (ref 0–1.2)
BUN SERPL-MCNC: 19 MG/DL (ref 8–27)
BUN/CREAT SERPL: 28 (ref 12–28)
CALCIUM SERPL-MCNC: 9.6 MG/DL (ref 8.7–10.3)
CANCER AG125 SERPL-ACNC: 12.2 U/ML (ref 0–38.1)
CHLORIDE SERPL-SCNC: 97 MMOL/L (ref 96–106)
CO2 SERPL-SCNC: 26 MMOL/L (ref 18–29)
CREAT SERPL-MCNC: 0.69 MG/DL (ref 0.57–1)
EOSINOPHIL # BLD AUTO: 0.3 X10E3/UL (ref 0–0.4)
EOSINOPHIL NFR BLD AUTO: 3 %
ERYTHROCYTE [DISTWIDTH] IN BLOOD BY AUTOMATED COUNT: 13.8 % (ref 12.3–15.4)
GLOBULIN SER CALC-MCNC: 3 G/DL (ref 1.5–4.5)
GLUCOSE SERPL-MCNC: 84 MG/DL (ref 65–99)
HCT VFR BLD AUTO: 41.8 % (ref 34–46.6)
HGB BLD-MCNC: 13.8 G/DL (ref 11.1–15.9)
IMM GRANULOCYTES # BLD: 0 X10E3/UL (ref 0–0.1)
IMM GRANULOCYTES NFR BLD: 0 %
LYMPHOCYTES # BLD AUTO: 2.7 X10E3/UL (ref 0.7–3.1)
LYMPHOCYTES NFR BLD AUTO: 26 %
MCH RBC QN AUTO: 30.5 PG (ref 26.6–33)
MCHC RBC AUTO-ENTMCNC: 33 G/DL (ref 31.5–35.7)
MCV RBC AUTO: 93 FL (ref 79–97)
MONOCYTES # BLD AUTO: 1 X10E3/UL (ref 0.1–0.9)
MONOCYTES NFR BLD AUTO: 10 %
NEUTROPHILS # BLD AUTO: 6.2 X10E3/UL (ref 1.4–7)
NEUTROPHILS NFR BLD AUTO: 60 %
PLATELET # BLD AUTO: 382 X10E3/UL (ref 150–379)
POTASSIUM SERPL-SCNC: 4.7 MMOL/L (ref 3.5–5.2)
PROT SERPL-MCNC: 7.3 G/DL (ref 6–8.5)
RBC # BLD AUTO: 4.52 X10E6/UL (ref 3.77–5.28)
SODIUM SERPL-SCNC: 139 MMOL/L (ref 134–144)
WBC # BLD AUTO: 10.2 X10E3/UL (ref 3.4–10.8)

## 2017-08-02 ENCOUNTER — HOSPITAL ENCOUNTER (OUTPATIENT)
Age: 72
Setting detail: OUTPATIENT SURGERY
Discharge: HOME | End: 2017-08-02
Attending: INTERNAL MEDICINE | Admitting: INTERNAL MEDICINE
Payer: COMMERCIAL

## 2017-08-02 ENCOUNTER — ANESTHESIA (OUTPATIENT)
Dept: ENDOSCOPY | Age: 72
End: 2017-08-02
Payer: COMMERCIAL

## 2017-08-02 ENCOUNTER — ANESTHESIA EVENT (OUTPATIENT)
Dept: ENDOSCOPY | Age: 72
End: 2017-08-02
Payer: COMMERCIAL

## 2017-08-02 VITALS
OXYGEN SATURATION: 99 % | TEMPERATURE: 97.9 F | HEART RATE: 60 BPM | DIASTOLIC BLOOD PRESSURE: 81 MMHG | SYSTOLIC BLOOD PRESSURE: 167 MMHG | RESPIRATION RATE: 13 BRPM

## 2017-08-02 PROCEDURE — 74011250636 HC RX REV CODE- 250/636

## 2017-08-02 PROCEDURE — 76060000031 HC ANESTHESIA FIRST 0.5 HR: Performed by: INTERNAL MEDICINE

## 2017-08-02 PROCEDURE — 77030027957 HC TBNG IRR ENDOGTR BUSS -B: Performed by: INTERNAL MEDICINE

## 2017-08-02 PROCEDURE — 76040000019: Performed by: INTERNAL MEDICINE

## 2017-08-02 RX ORDER — SODIUM CHLORIDE 0.9 % (FLUSH) 0.9 %
5-10 SYRINGE (ML) INJECTION EVERY 8 HOURS
Status: DISCONTINUED | OUTPATIENT
Start: 2017-08-02 | End: 2017-08-02 | Stop reason: HOSPADM

## 2017-08-02 RX ORDER — MIDAZOLAM HYDROCHLORIDE 1 MG/ML
.25-5 INJECTION, SOLUTION INTRAMUSCULAR; INTRAVENOUS
Status: DISCONTINUED | OUTPATIENT
Start: 2017-08-02 | End: 2017-08-02 | Stop reason: HOSPADM

## 2017-08-02 RX ORDER — DEXTROMETHORPHAN/PSEUDOEPHED 2.5-7.5/.8
1.2 DROPS ORAL
Status: DISCONTINUED | OUTPATIENT
Start: 2017-08-02 | End: 2017-08-02 | Stop reason: HOSPADM

## 2017-08-02 RX ORDER — SODIUM CHLORIDE 9 MG/ML
INJECTION, SOLUTION INTRAVENOUS
Status: DISCONTINUED | OUTPATIENT
Start: 2017-08-02 | End: 2017-08-02 | Stop reason: HOSPADM

## 2017-08-02 RX ORDER — EPINEPHRINE 0.1 MG/ML
1 INJECTION INTRACARDIAC; INTRAVENOUS
Status: DISCONTINUED | OUTPATIENT
Start: 2017-08-02 | End: 2017-08-02 | Stop reason: HOSPADM

## 2017-08-02 RX ORDER — SODIUM CHLORIDE 9 MG/ML
50 INJECTION, SOLUTION INTRAVENOUS CONTINUOUS
Status: DISCONTINUED | OUTPATIENT
Start: 2017-08-02 | End: 2017-08-02 | Stop reason: HOSPADM

## 2017-08-02 RX ORDER — FENTANYL CITRATE 50 UG/ML
100 INJECTION, SOLUTION INTRAMUSCULAR; INTRAVENOUS
Status: DISCONTINUED | OUTPATIENT
Start: 2017-08-02 | End: 2017-08-02 | Stop reason: HOSPADM

## 2017-08-02 RX ORDER — SODIUM CHLORIDE 0.9 % (FLUSH) 0.9 %
5-10 SYRINGE (ML) INJECTION AS NEEDED
Status: DISCONTINUED | OUTPATIENT
Start: 2017-08-02 | End: 2017-08-02 | Stop reason: HOSPADM

## 2017-08-02 RX ORDER — NALOXONE HYDROCHLORIDE 0.4 MG/ML
0.4 INJECTION, SOLUTION INTRAMUSCULAR; INTRAVENOUS; SUBCUTANEOUS
Status: DISCONTINUED | OUTPATIENT
Start: 2017-08-02 | End: 2017-08-02 | Stop reason: HOSPADM

## 2017-08-02 RX ORDER — FLUMAZENIL 0.1 MG/ML
0.2 INJECTION INTRAVENOUS
Status: DISCONTINUED | OUTPATIENT
Start: 2017-08-02 | End: 2017-08-02 | Stop reason: HOSPADM

## 2017-08-02 RX ORDER — ATROPINE SULFATE 0.1 MG/ML
0.5 INJECTION INTRAVENOUS
Status: DISCONTINUED | OUTPATIENT
Start: 2017-08-02 | End: 2017-08-02 | Stop reason: HOSPADM

## 2017-08-02 RX ORDER — PROPOFOL 10 MG/ML
INJECTION, EMULSION INTRAVENOUS AS NEEDED
Status: DISCONTINUED | OUTPATIENT
Start: 2017-08-02 | End: 2017-08-02 | Stop reason: HOSPADM

## 2017-08-02 RX ADMIN — PROPOFOL 60 MG: 10 INJECTION, EMULSION INTRAVENOUS at 08:34

## 2017-08-02 RX ADMIN — SODIUM CHLORIDE: 9 INJECTION, SOLUTION INTRAVENOUS at 08:32

## 2017-08-02 RX ADMIN — PROPOFOL 40 MG: 10 INJECTION, EMULSION INTRAVENOUS at 08:35

## 2017-08-02 RX ADMIN — PROPOFOL 10 MG: 10 INJECTION, EMULSION INTRAVENOUS at 08:36

## 2017-08-02 RX ADMIN — PROPOFOL 70 MG: 10 INJECTION, EMULSION INTRAVENOUS at 08:37

## 2017-08-02 RX ADMIN — PROPOFOL 70 MG: 10 INJECTION, EMULSION INTRAVENOUS at 08:38

## 2017-08-02 NOTE — H&P
[address] [facility] [address] [place] [address], [address] [facility]                 Edgar Alcala is a  73 y.o.  female who presents with no symptoms for screening. .        Past Medical History:   Diagnosis Date    Adopted     Adverse effect of anesthesia     tremors and shaking after anesthesia/\"taks silly\"    BRCA negative 5/2013    Dr. Nati Alberto Cervical arthritis [facility])     History of ovarian cancer 2013    surgery/chemotherapy    Hypercholesterolemia     Hypertension     Ill-defined condition     hx passing out from dehydration    Other anxiety states     Other ill-defined conditions 2010    BOWEL OBSTRUCTION  ? IMPACTION    Unspecified adverse effect of anesthesia     TREMORS AFTERWARDS     Past Surgical History:   Procedure Laterality Date    HX CHOLECYSTECTOMY  2000    HX DILATION AND CURETTAGE      HX GYN  1/11/13    RSO    HX GYN  2/13/13    TLH/LSO, omentectomy, lymph node dissection    HX TONSILLECTOMY      AS CHILD    HX TUBAL LIGATION  1978    HX VASCULAR ACCESS      placed and removed    HX WISDOM TEETH EXTRACTION       Allergies   Allergen Reactions    Lisinopril Cough    Simvastatin Myalgia    Codeine Other (comments)     Unable to function    Lidocaine Hives     Current Facility-Administered Medications   Medication Dose Route Frequency Provider Last Rate Last Dose    0.9% sodium chloride infusion  50 mL/hr IntraVENous CONTINUOUS Tori Heredia MD        sodium chloride (NS) flush 5-10 mL  5-10 mL IntraVENous Q8H Tori Heredia MD        sodium chloride (NS) flush 5-10 mL  5-10 mL IntraVENous PRN Tori Heredia MD        midazolam (VERSED) injection 0.25-5 mg  0.25-5 mg IntraVENous Irina Heredia MD        fentaNYL citrate (PF) injection 100 mcg  100 mcg IntraVENous Irina Heredia MD        naloxone [facility]) injection 0.4 mg  0.4 mg IntraVENous Irina Heredia MD        flumazenil (ROMAZICON) 0.1 mg/mL injection 0.2 mg  0.2 mg IntraVENous Irina Clifford MD        [facility]) 37EX/1.1WW oral drops 80 mg  1.2 mL Oral Irina Clifford MD        atropine injection 0.5 mg  0.5 mg IntraVENous ONCE PRN Fahad Clifford MD        EPINEPHrine (ADRENALIN) 0.1 mg/mL syringe 1 mg  1 mg Endoscopically ONCE PRN Fahad Clifford MD           Visit Vitals    /75    Pulse 65    Temp 97.9 °F (36.6 °C)    Resp 16    SpO2 97%           PHYSICAL EXAM:  General: WD, WN. Alert, cooperative, no acute distress    HEENT: NC, Atraumatic. PERRLA, EOMI. Anicteric sclerae. Mallampati score 2  Lungs:  CTA Bilaterally. No Wheezing/Rhonchi/Rales. Heart:  Regular  rhythm,  No murmur (), No Rubs, No Gallops  Abdomen: Soft, Non distended, Non tender.  +Bowel sounds, no HSM  Extremities: No c/c/e  Neurologic:  CN 2-12 gi, Alert and oriented X 3. No acute neurological distress   Psych:   Good insight. Not anxious nor agitated. Plan:   Endoscopic procedure with MACChen Clifford MD  8/2/2017  8:30 AM

## 2017-08-02 NOTE — PROGRESS NOTES

## 2017-08-02 NOTE — ANESTHESIA PREPROCEDURE EVALUATION
Anesthetic History               Review of Systems / Medical History  Patient summary reviewed, nursing notes reviewed and pertinent labs reviewed    Pulmonary                   Neuro/Psych              Cardiovascular    Hypertension              Exercise tolerance: >4 METS     GI/Hepatic/Renal               Comments: screening Endo/Other        Arthritis     Other Findings              Physical Exam    Airway  Mallampati: II  TM Distance: > 6 cm  Neck ROM: normal range of motion   Mouth opening: Normal     Cardiovascular    Rhythm: regular  Rate: normal         Dental  No notable dental hx       Pulmonary  Breath sounds clear to auscultation               Abdominal         Other Findings            Anesthetic Plan    ASA: 2  Anesthesia type: MAC          Induction: Intravenous  Anesthetic plan and risks discussed with: Patient

## 2017-08-02 NOTE — ROUTINE PROCESS
Garth Ness  1945  [id number]    Situation:  Verbal report received from: Katja Forman RN  Procedure: Procedure(s):  COLONOSCOPY    Background:    Preoperative diagnosis: REPEAT SCREENING   Postoperative diagnosis: 1. normal colonoscopy    :  Dr. Kallie Drummond  Assistant(s): Endoscopy Technician-1: Ashley Arenas  Endoscopy RN-1: Courtenay Lefort, RN    Specimens: * No specimens in log *  H. Pylori  no    Assessment:  Intra-procedure medications     Anesthesia gave intra-procedure sedation and medications, see anesthesia flow sheet yes    Intravenous fluids: NS@ KVO     Vital signs stable     Abdominal assessment: round and soft     Recommendation:  Discharge patient per MD order. Family or Friend   Permission to share finding with family or friend yes    Endoscopy discharge instructions have been reviewed and given to patient and spouse. The patient and spouse verbalized understanding and acceptance of instructions.

## 2017-08-02 NOTE — DISCHARGE INSTRUCTIONS
[address], [facility] [place] Bruno Kaplan  [id number]  1945    COLON DISCHARGE INSTRUCTIONS    DISCOMFORT:  Redness at IV site- apply warm compress to area; if redness or soreness persist- contact your physician  There may be a slight amount of blood passed from the rectum  Gaseous discomfort- walking, belching will help relieve any discomfort  You may not operate a vehicle for 12 hours  You may not engage in an occupation involving machinery or appliances for rest of today  You may not drink alcoholic beverages for at least 12 hours  Avoid making any critical decisions for at least 24 hour  DIET:   Regular diet. - however -  remember your colon is empty and a heavy meal will produce gas. Avoid these foods:  vegetables, fried / greasy foods, carbonated drinks for today         ACTIVITY:  You may resume your normal daily activities it is recommended that you spend the remainder of the day resting -  avoid any strenuous activity. CALL M.D. ANY SIGN OF:   Increasing pain, nausea, vomiting  Abdominal distension (swelling)  New increased bleeding (oral or rectal)  Fever (chills)  Pain in chest area  Bloody discharge from nose or mouth  Shortness of breath     Follow-up Instructions:   Call Dr. Gary Petersen for any questions or problems.    Telephone # [phone]  Should have a repeat colonoscopy in 10 years    Impression:  1. normal colonoscopy

## 2017-08-02 NOTE — IP AVS SNAPSHOT
[address] 
[phone] Patient: Leslie Valderrama MRN: [id number] :1945 You are allergic to the following Allergen Reactions Lisinopril Cough Simvastatin Myalgia Codeine Other (comments) Unable to function Lidocaine Hives Recent Documentation OB Status Smoking Status Hysterectomy Never Smoker Emergency Contacts Name Discharge Info Relation Home Work Mobile [employer] Pack  Spouse [3] [phone] About your hospitalization You were admitted on:  2017 You last received care in the:  [facility] ENDOSCOPY You were discharged on:  2017 Unit phone number:  [phone] Why you were hospitalized Your primary diagnosis was:  Not on File Providers Seen During Your Hospitalizations Provider Role Specialty Primary office phone Jyoti Gale MD Attending Provider Gastroenterology [phone] Your Primary Care Physician (PCP) Primary Care Physician Office Phone Office Fax Ravindraceasar Ayesha [phone] Follow-up Information None Your Appointments 2017  9:15 AM EDT  
COMPLETE PHYSICAL with MD Jagdeep Crawford [facility]) [address], [address] 596 [facility] [place]  
[phone] Current Discharge Medication List  
  
CONTINUE these medications which have NOT CHANGED Dose & Instructions Dispensing Information Comments Morning Noon Evening Bedtime CENTRUM SILVER PO Your last dose was: Your next dose is: Take  by mouth. Takes one po once daily. Refills:  0  
     
   
   
   
  
 cetirizine 10 mg tablet Commonly known as:  ZYRTEC Your last dose was:     
   
Your next dose is:    
   
   
 Dose:  10 mg  
 Take 10 mg by mouth daily. Refills:  0  
     
   
   
   
  
 CO Q-10 300 mg Cap Generic drug:  coenzyme q10 Your last dose was: Your next dose is:    
   
   
 Dose:  300 mg Take 300 mg by mouth daily. Refills:  0  
     
   
   
   
  
 escitalopram oxalate 10 mg tablet Commonly known as:  Shakir Goes Your last dose was: Your next dose is: TAKE 1 TABLET EVERY DAY Quantity:  90 Tab Refills:  3  
     
   
   
   
  
 * ESTRACE 0.01 % (0.1 mg/gram) vaginal cream  
Generic drug:  estradiol Your last dose was: Your next dose is:    
   
   
 Dose:  1 g Insert 1 g into vagina two (2) times a week. SUN and WED Refills:  0  
     
   
   
   
  
 * estradiol 1 mg tablet Commonly known as:  ESTRACE Your last dose was: Your next dose is: TAKE ONE TABLET BY MOUTH DAILY Quantity:  30 Tab Refills:  12 FISH OIL PO Your last dose was: Your next dose is:    
   
   
 Dose:  1200 mg Take 1,200 mg by mouth daily. Refills:  0  
     
   
   
   
  
 losartan 100 mg tablet Commonly known as:  COZAAR Your last dose was: Your next dose is:    
   
   
 Dose:  100 mg Take 1 Tab by mouth daily. Quantity:  30 Tab Refills:  5  
     
   
   
   
  
 pravastatin 20 mg tablet Commonly known as:  PRAVACHOL Your last dose was: Your next dose is: TAKE ONE TABLET BY M0UTH EVERY NIGHT Quantity:  90 Tab Refills:  1 VITAMIN C 500 mg tablet Generic drug:  ascorbic acid (vitamin C) Your last dose was: Your next dose is:    
   
   
 Dose:  250 mg Take 250 mg by mouth daily. Refills:  0  
     
   
   
   
  
 * Notice: This list has 2 medication(s) that are the same as other medications prescribed for you.  Read the directions carefully, and ask your doctor or other care provider to review them with you. Discharge Instructions [address], [address] [facility] [place] [id number] 
1945 COLON DISCHARGE INSTRUCTIONS DISCOMFORT: 
Redness at IV site- apply warm compress to area; if redness or soreness persist- contact your physician There may be a slight amount of blood passed from the rectum Gaseous discomfort- walking, belching will help relieve any discomfort You may not operate a vehicle for 12 hours You may not engage in an occupation involving machinery or appliances for rest of today You may not drink alcoholic beverages for at least 12 hours Avoid making any critical decisions for at least 24 hour DIET: 
 Regular diet.  however -  remember your colon is empty and a heavy meal will produce gas. Avoid these foods:  vegetables, fried / greasy foods, carbonated drinks for today ACTIVITY: 
You may resume your normal daily activities it is recommended that you spend the remainder of the day resting -  avoid any strenuous activity. CALL M.D. ANY SIGN OF: Increasing pain, nausea, vomiting Abdominal distension (swelling) New increased bleeding (oral or rectal) Fever (chills) Pain in chest area Bloody discharge from nose or mouth Shortness of breath Follow-up Instructions: 
 Call Dr. Horacio Armando for any questions or problems. Telephone # [phone] Should have a repeat colonoscopy in 10 years Impression:  1. normal colonoscopy Discharge Orders None Introducing [facility]! Dear Danni Milton: Thank you for requesting a Kaliki account. Our records indicate that you already have an active Kaliki account. You can access your account anytime at [email or web address] Did you know that you can access your hospital and ER discharge instructions at any time in Kaliki?   You can also review all of your test results from your hospital stay or ER visit. Additional Information If you have questions, please visit the Frequently Asked Questions section of the [employer] website at [email or web address] Remember, MyChart is NOT to be used for urgent needs. For medical emergencies, dial 911. Now available from your iPhone and Android! General Information Please provide this summary of care documentation to your next provider. Patient Signature:  ____________________________________________________________ Date:  ____________________________________________________________  
  
[facility] Provider Signature:  ____________________________________________________________ Date:  ____________________________________________________________

## 2017-08-02 NOTE — ANESTHESIA POSTPROCEDURE EVALUATION
Post-Anesthesia Evaluation and Assessment    Patient: Eda Jackson MRN: [id number]  SSN: [id number]    YOB: 1945  Age: 73 y.o. Sex: female       Cardiovascular Function/Vital Signs  Visit Vitals    /73    Pulse 63    Temp 36.6 °C (97.9 °F)    Resp 17    SpO2 96%       Patient is status post MAC anesthesia for Procedure(s):  COLONOSCOPY. Nausea/Vomiting: None    Postoperative hydration reviewed and adequate. Pain:  Pain Scale 1: Numeric (0 - 10) (08/02/17 0855)  Pain Intensity 1: 0 (08/02/17 0855)   Managed    Neurological Status: At baseline    Mental Status and Level of Consciousness: Arousable    Pulmonary Status:   O2 Device: Room air (08/02/17 0855)   Adequate oxygenation and airway patent    Complications related to anesthesia: None    Post-anesthesia assessment completed.  No concerns    Signed By: Marilyn Lucero MD     August 2, 2017

## 2017-08-02 NOTE — PROCEDURES
[address]  [address]              :  Ana Maria Jorgensen MD    Referring Provider: Thalia Ott MD    Sedation:  MAC anesthesia Propofol        Prior to the procedure its objectives, risks, consequences and alternatives were discussed with the patient who then elected to proceed. The patient had the opportunity to ask questions and those questions were answered. A physical exam was performed. The heart, lungs, and mental status were examined prior to the procedure and found to be satisfactory for conscious sedation and for the procedure. Conscious sedation was initiated by the physician. Continuous pulse oximetry and blood pressure monitoring were used throughout the procedure. After appropriate analgesia and rectal exam, the colonoscope was passed into the anus and passed to the cecum without difficulty. The prep was good. On slow withdrawal of the scope, the cecum, ascending colon, hepatic flexure, transverse colon, splenic flexure, descending colon, sigmoid and rectum were normal. Retroflexion exam of the rectum was normal. She tolerated the procedure without complication and I recommend a repeat colonoscopy in 10 years. Specimen none    Complications: None. EBL:  None.     Ana Maria Jorgensen MD  8/2/2017  8:48 AM

## 2017-08-04 LAB
CHOLEST SERPL-MCNC: 220 MG/DL (ref 100–199)
HDLC SERPL-MCNC: 89 MG/DL
INTERPRETATION, 910389: NORMAL
LDLC SERPL CALC-MCNC: 106 MG/DL (ref 0–99)
TRIGL SERPL-MCNC: 126 MG/DL (ref 0–149)
VLDLC SERPL CALC-MCNC: 25 MG/DL (ref 5–40)

## 2017-08-08 LAB
ALBUMIN SERPL-MCNC: 4.2 G/DL (ref 3.6–4.8)
ALBUMIN/GLOB SERPL: 1.4 {RATIO} (ref 1.2–2.2)
ALP SERPL-CCNC: 72 IU/L (ref 39–117)
ALT SERPL-CCNC: 20 IU/L (ref 0–32)
AST SERPL-CCNC: 23 IU/L (ref 0–40)
BILIRUB SERPL-MCNC: <0.2 MG/DL (ref 0–1.2)
BUN SERPL-MCNC: 12 MG/DL (ref 8–27)
BUN/CREAT SERPL: 18 (ref 12–28)
CALCIUM SERPL-MCNC: 9.3 MG/DL (ref 8.7–10.3)
CHLORIDE SERPL-SCNC: 100 MMOL/L (ref 96–106)
CO2 SERPL-SCNC: 20 MMOL/L (ref 18–29)
CREAT SERPL-MCNC: 0.68 MG/DL (ref 0.57–1)
GLOBULIN SER CALC-MCNC: 3 G/DL (ref 1.5–4.5)
GLUCOSE SERPL-MCNC: 83 MG/DL (ref 65–99)
POTASSIUM SERPL-SCNC: 4.7 MMOL/L (ref 3.5–5.2)
PROT SERPL-MCNC: 7.2 G/DL (ref 6–8.5)
SODIUM SERPL-SCNC: 140 MMOL/L (ref 134–144)
SPECIMEN STATUS REPORT, ROLRST: NORMAL
TSH SERPL DL<=0.005 MIU/L-ACNC: 0.86 UIU/ML (ref 0.45–4.5)

## 2017-08-11 ENCOUNTER — OFFICE VISIT (OUTPATIENT)
Dept: INTERNAL MEDICINE CLINIC | Age: 72
End: 2017-08-11

## 2017-08-11 VITALS
HEIGHT: 64 IN | TEMPERATURE: 97.4 F | BODY MASS INDEX: 29.19 KG/M2 | HEART RATE: 71 BPM | SYSTOLIC BLOOD PRESSURE: 122 MMHG | RESPIRATION RATE: 18 BRPM | OXYGEN SATURATION: 96 % | DIASTOLIC BLOOD PRESSURE: 66 MMHG | WEIGHT: 171 LBS

## 2017-08-11 DIAGNOSIS — Z00.00 ROUTINE GENERAL MEDICAL EXAMINATION AT A HEALTH CARE FACILITY: Primary | ICD-10-CM

## 2017-08-11 DIAGNOSIS — I10 ESSENTIAL HYPERTENSION: ICD-10-CM

## 2017-08-11 DIAGNOSIS — C56.1 OVARIAN CANCER, RIGHT (HCC): ICD-10-CM

## 2017-08-11 DIAGNOSIS — F41.9 ANXIETY: ICD-10-CM

## 2017-08-11 DIAGNOSIS — M85.89 OSTEOPENIA OF MULTIPLE SITES: ICD-10-CM

## 2017-08-11 DIAGNOSIS — E78.2 HYPERCHOLESTEROLEMIA WITH HYPERTRIGLYCERIDEMIA: ICD-10-CM

## 2017-08-11 NOTE — PROGRESS NOTES
Establish Care and Complete Physical       HPI:  Simona Polanco is a 73y.o. year old female who is here to establish care. She  had her medical care:    [facility]    She reports the following history and medical concerns:      Cholesterol- runs in family. Father had massive stroke at 77. Recently started on pravachol 20 mg. . No problems with it. Myalgia with simvastatin. HTN- Losartan 100 mg. 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 [place] really bad and cold sweats. 2 weeks ago. After she ate. No chest pain. HRT- went off for 3 months (worst joint pain when she was off it. Hips and shoulder joints)  Had xray joints- nothing major. Clear cell carcinoma- ovary. GYN restarted it. Stress incontinence since surgery. Stage I ovarian cancer but clear cell. Anxiety- lexapro 10 mg. Always tired. Assessment and Plan        1. Routine general medical examination at a health care facility  Well exam.  Some fatigue since ovarian cancer surgery. Following GYN. 2. Essential hypertension  Tolerating medication. Denies dizziness that is positional, SOB, or chest pain. Understands the importance of compliance to reduce risk of future heart failure. Agreed to call if any of above symptoms develop and  stay on current regimen of  losartan    3. Ovarian cancer, right ([address]  As per GYN. Patient feels well. Clear cell type. On HRT. 4. Hypercholesterolemia with hypertriglyceridemia  The nature of cardiac risk has been fully discussed with this patient. I have made her aware of her LDL target goal given her cardiovascular risk analysis. I have discussed the appropriate diet. The need for lifelong compliance in order to reduce risk is stressed. A regular exercise program is recommended to help achieve and maintain normal body weight, fitness and improve lipid balance. The risks and benefits of medications were discussed.   Advised to have Omega 3 Fish Oil 1000 mg as a supplement. Last cholesterol labs reviewed with patient. Patient made aware to get liver checked every 6 months. Continue with  pravachol 20 mg    5. Osteopenia of multiple sites  Recheck DXA. Pt on HRT with GYN.  - DEXA BONE DENSITY STUDY AXIAL; Future    6. Anxiety  Reviewed side effects of medication and states anxiety is stable. No impulsive thoughts, sleep is not affected by medication. Exercise helps reduce anxiety. No GI symptoms. Pt instructed to call if above symptoms and agreed to stay on   lexapro            Visit Vitals    /66 (BP 1 Location: Left arm, BP Patient Position: Sitting)    Pulse 71    Temp 97.4 °F (36.3 °C) (Oral)    Resp 18    Ht 5' 4\" (1.626 m)    Wt 171 lb (77.6 kg)    SpO2 96%    BMI 29.35 kg/m2       Historical Data    Past Medical History:   Diagnosis Date    Adopted     Adverse effect of anesthesia     tremors and shaking after anesthesia/\"taks silly\"    BRCA negative 5/2013    Dr. Sonny Bennett Cervical [facility])     History of ovarian cancer 2013    surgery/chemotherapy    Hypercholesterolemia     Hypertension     Ill-defined condition     hx passing out from dehydration    Other anxiety states     Other ill-defined conditions 2010    BOWEL OBSTRUCTION  ? IMPACTION    Unspecified adverse effect of anesthesia     TREMORS AFTERWARDS       Past Surgical History:   Procedure Laterality Date    COLONOSCOPY N/A 8/2/2017    COLONOS[facility]a [facility] ENDOSCOPY    HX CHOLECYSTECTOMY  2000    HX DILATION AND CURETTAGE      HX GYN  1/11/13    RSO    HX GYN  2/13/13    TLH/LSO, omentectomy, lymph node dissection    HX TONSILLECTOMY      AS CHILD    HX TUBAL LIGATION  1978    HX VASCULAR ACCESS      placed and removed    HX WISDOM TEETH EXTRACTION         Outpatient Encounter Prescriptions as of 8/11/2017   Medication Sig Dispense Refill    pneumococcal 13 daysi conj dip (PREVNAR-13) 0.5 mL syrg injection 0.5 mL by IntraMUSCular route once for 1 dose. 0.5 mL 0    varicella zoster vacine live (ZOSTAVAX) 19,400 unit/0.65 mL susr injection 1 Vial by SubCUTAneous route once for 1 dose. 2.74 mL 0    FOLIC ACID/MULTIVIT-MIN/LUTEIN (CENTRUM SILVER PO) Take  by mouth. Takes one po once daily.  coenzyme q10 (CO Q-10) 300 mg cap Take 300 mg by mouth daily.  DOCOSAHEXANOIC ACID/EPA (FISH OIL PO) Take 1,200 mg by mouth daily.  estradiol (ESTRACE) 1 mg tablet TAKE ONE TABLET BY MOUTH DAILY 30 Tab 12    pravastatin (PRAVACHOL) 20 mg tablet TAKE ONE TABLET BY M0UTH EVERY NIGHT 90 Tab 1    losartan (COZAAR) 100 mg tablet Take 1 Tab by mouth daily. 30 Tab 5    escitalopram oxalate (LEXAPRO) 10 mg tablet TAKE 1 TABLET EVERY DAY 90 Tab 3    estradiol (ESTRACE) 0.01 % (0.1 mg/gram) vaginal cream Insert 1 g into vagina two (2) times a week. SUN and WED      ascorbic acid (VITAMIN C) 500 mg tablet Take 250 mg by mouth daily.  cetirizine (ZYRTEC) 10 mg tablet Take 10 mg by mouth daily. No facility-administered encounter medications on file as of 8/11/2017. Allergies   Allergen Reactions    Simvastatin Myalgia    Codeine Other (comments)     Unable to function    Lidocaine Hives        Social History     Social History    Marital status:      Spouse name: N/A    Number of children: N/A    Years of education: N/A     Occupational History    Banking Retired     Social History Main Topics    Smoking status: Never Smoker    Smokeless tobacco: Never Used    Alcohol use 6.0 oz/week     10 Glasses of wine per week      Comment: 1-2 glasses nightly    Drug use: No    Sexual activity: No     Other Topics Concern    Exercise Yes     walking 1/2 - 1 mile daily     Social History Narrative    . Adopted by great aunt and uncle. Retired from banking industry. Two adult sons, one currently at home with her with his wife and grandchild.           family history includes Dementia in her mother; Diabetes in an other family member; Drug Abuse in her son; Heart Attack in an other family member; Heart Disease in an other family member; Hypertension in her father and mother; Parkinsonism in her mother; Stroke in her father. Review of Systems   Constitutional: Negative for chills, diaphoresis, fever, malaise/fatigue and weight loss. HENT: Negative for hearing loss. Respiratory: Negative for cough. Cardiovascular: Negative for chest pain. Gastrointestinal: Negative for blood in stool and constipation. Genitourinary: Negative for dysuria, flank pain, frequency and urgency. Musculoskeletal: Negative for myalgias. Skin: Negative for rash. Neurological: Negative for dizziness, weakness and headaches. Endo/Heme/Allergies: Does not bruise/bleed easily. Physical Exam   Constitutional: She is oriented to person, place, and time. She appears well-developed and well-nourished. No distress. HENT:   Mouth/Throat: No oropharyngeal exudate. Eyes: Conjunctivae are normal. Pupils are equal, round, and reactive to light. Left eye exhibits no discharge. No scleral icterus. Neck: No thyromegaly present. Cardiovascular: Normal rate, regular rhythm and normal heart sounds. Exam reveals no gallop and no friction rub. No murmur heard. Pulmonary/Chest: No respiratory distress. Abdominal: She exhibits no distension. Musculoskeletal: Normal range of motion. She exhibits no edema or deformity. Lymphadenopathy:     She has no cervical adenopathy. Neurological: She is alert and oriented to person, place, and time. Skin: Skin is warm and dry. No erythema. Psychiatric: She has a normal mood and affect. Judgment and thought content normal.   Nursing note and vitals reviewed.      Ortho Exam       Orders Placed This Encounter    DEXA BONE DENSITY STUDY AXIAL     Standing Status:   Future     Standing Expiration Date:   9/11/2018     Order Specific Question:   Reason for Exam Answer:   Osteopenia    pneumococcal 13 daysi conj dip (PREVNAR-13) 0.5 mL syrg injection     Si.5 mL by IntraMUSCular route once for 1 dose. Dispense:  0.5 mL     Refill:  0    varicella zoster vacine live (ZOSTAVAX) 19,400 unit/0.65 mL susr injection     Si Vial by SubCUTAneous route once for 1 dose. Dispense:  0.65 mL     Refill:  0        I have reviewed the patient's medical history in detail and updated the computerized patient record. We had a prolonged discussion about these complex clinical issues and went over the various important aspects to consider. All questions were answered. Advised her to call back or return to office if symptoms do not improve, change in nature, or persist.    She was given an after visit summary or informed of [employer] Access which includes patient instructions, diagnoses, current medications, & vitals. She expressed understanding with the diagnosis and plan.

## 2017-08-11 NOTE — PATIENT INSTRUCTIONS
Omron BP machine (arm) plug in    Exercise 1:  The 4-7-8 (or Relaxing Breath) Exercise  This exercise is utterly simple, takes almost no time, requires no equipment and can be done anywhere. Although you can do the exercise in any position, sit with your back straight while learning the exercise. Place the tip of your tongue against the ridge of tissue just behind your upper front teeth, and keep it there through the entire exercise. You will be exhaling through your mouth around your tongue; try pursing your lips slightly if this seems awkward.  Exhale completely through your mouth, making a whoosh sound.  Close your mouth and inhale quietly through your nose to a mental count of four.  Hold your breath for a count of seven.  Exhale completely through your mouth, making a whoosh sound to a count of eight.  This is one breath. Now inhale again and repeat the cycle three more times for a total of four breaths. Note that you always inhale quietly through your nose and exhale audibly through your mouth. The tip of your tongue stays in position the whole time. Exhalation takes twice as long as inhalation. The absolute time you spend on each phase is not important; the ratio of 4:7:8 is important. If you have trouble holding your breath, speed the exercise up but keep to the ratio of 4:7:8 for the three phases. With practice you can slow it all down and get used to inhaling and exhaling more and more deeply. This exercise is a natural tranquilizer for the nervous system. Unlike tranquilizing drugs, which are often effective when you first take them but then lose their power over time, this exercise is subtle when you first try it but gains in power with repetition and practice. Do it at least twice a day. You cannot do it too frequently. Do not do more than four breaths at one time for the first month of practice. Later, if you wish, you can extend it to eight breaths.  If you feel a little lightheaded when you first breathe this way, do not be concerned; it will pass. Once you develop this technique by practicing it every day, it will be a very useful tool that you will always have with you. Use it whenever anything upsetting happens - before you react. Use it whenever you are aware of internal tension. Use it to help you fall asleep. This exercise cannot be recommended too highly. Everyone can benefit from it. Exercise 2:  Breath Counting  If you want to get a feel for this challenging work, try your hand at breath counting, a deceptively simple technique much used in [address] practice. Sit in a comfortable position with the spine straight and head inclined slightly forward. Gently close your eyes and take a few deep breaths. Then let the breath come naturally without trying to influence it. Ideally it will be quiet and slow, but depth and rhythm may vary.  To begin the exercise, count \"one\" to yourself as you exhale.  The next time you exhale, count \"two,\" and so on up to [place]. \"   Then begin a new cycle, counting \"one\" on the next exhalation. Never count higher than \"five,\" and count only when you exhale. You will know your attention has wandered when you find yourself up to \"eight,\" \"12,\" even \"19. \"  Try to do 10 minutes of this form of meditation.      Taken from Maycol Boswell MD [facility] of [id number] [employer] breathing exercises on [employer] and you will see a video

## 2017-08-11 NOTE — MR AVS SNAPSHOT
Visit Information Date & Time Provider Department Dept. Phone Encounter #  
 8/11/2017  9:15 AM Thania Alves MD [place] [id number] [facility] [phone] [id number] Follow-up Instructions Return in about 6 months (around 2/11/2018) for Follow up. Your Appointments 1/16/2018 11:00 AM  
6 MONTH with Elise Mckenzie MD  
[facility] Gynecologic Oncology [address] [facility]) Appt Note: 6mo [address] [facility] [address] [id number]  
[address] Upcoming Health Maintenance Date Due FOBT Q 1 YEAR AGE 50-75 11/21/1997 OSTEOPOROSIS SCREENING (DEXA) 11/21/2012 Pneumococcal 65+ High/Highest Risk (2 of 2 - PCV13) 11/22/2014 MEDICARE YEARLY EXAM 9/25/2016 GLAUCOMA SCREENING Q2Y 1/1/2017 PAP AKA CERVICAL CYTOLOGY 6/14/2017 INFLUENZA AGE 9 TO ADULT 8/1/2017 BREAST CANCER SCRN MAMMOGRAM 10/7/2018 DTaP/Tdap/Td series (2 - Td) 8/12/2024 Allergies as of 8/11/2017  Review Complete On: 8/11/2017 By: Thania Alves MD  
  
 Severity Noted Reaction Type Reactions Simvastatin Medium 01/05/2017   Side Effect Myalgia Codeine  11/02/2011   Systemic Other (comments) Unable to function Lidocaine  11/02/2011   Systemic Hives Current Immunizations  Reviewed on 10/13/2016 Name Date Influenza Vaccine 10/8/2016, 9/18/2012 Pneumococcal Polysaccharide (PPSV-23) 11/22/2013 Tdap 8/12/2014 Not reviewed this visit You Were Diagnosed With   
  
 Codes Comments Routine general medical examination at a health care facility    -  Primary ICD-10-CM: Z00.00 ICD-9-CM: V70.0 Essential hypertension     ICD-10-CM: I10 
ICD-9-CM: 401.9 Ovarian cancer, right ([address]     ICD-10-CM: C56.1 ICD-9-CM: 183.0 Hypercholesterolemia with hypertriglyceridemia     ICD-10-CM: E78.2 ICD-9-CM: 272.2  Osteopenia of multiple sites     ICD-10-CM: M85.89 
 ICD-9-CM: 733.90 Vitals BP Pulse Temp Resp Height(growth percentile) Weight(growth percentile) 122/66 (BP 1 Location: Left arm, BP Patient Position: Sitting) 71 97.4 °F (36.3 °C) (Oral) 18 5' 4\" (1.626 m) 171 lb (77.6 kg) SpO2 BMI OB Status Smoking Status 96% 29.35 kg/m2 Hysterectomy Never Smoker BMI and BSA Data Body Mass Index Body Surface Area  
 29.35 kg/m 2 1.87 m 2 Preferred Pharmacy Pharmacy Name Phone [id number] [address], [address] [phone] Your Updated Medication List  
  
   
This list is accurate as of: 8/11/17 10:07 AM.  Always use your most recent med list.  
  
  
  
  
 CENTRUM SILVER PO Take  by mouth. Takes one po once daily. cetirizine 10 mg tablet Commonly known as:  ZYRTEC Take 10 mg by mouth daily. CO Q-10 300 mg Cap Generic drug:  coenzyme q10 Take 300 mg by mouth daily. escitalopram oxalate 10 mg tablet Commonly known as:  Celesta Prost TAKE 1 TABLET EVERY DAY  
  
 * ESTRACE 0.01 % (0.1 mg/gram) vaginal cream  
Generic drug:  estradiol Insert 1 g into vagina two (2) times a week. SUN and WED * estradiol 1 mg tablet Commonly known as:  ESTRACE  
TAKE ONE TABLET BY MOUTH DAILY FISH OIL PO Take 1,200 mg by mouth daily. losartan 100 mg tablet Commonly known as:  COZAAR Take 1 Tab by mouth daily. pneumococcal 13 daysi conj dip 0.5 mL Syrg injection Commonly known as:  PREVNAR-13  
0.5 mL by IntraMUSCular route once for 1 dose. pravastatin 20 mg tablet Commonly known as:  PRAVACHOL  
TAKE ONE TABLET BY M0UTH EVERY NIGHT  
  
 varicella zoster vacine live 19,400 unit/0.65 mL Susr injection Commonly known as:  ZOSTAVAX  
1 Vial by SubCUTAneous route once for 1 dose. VITAMIN C 500 mg tablet Generic drug:  ascorbic acid (vitamin C) Take 250 mg by mouth daily. * Notice: This list has 2 medication(s) that are the same as other medications prescribed for you. Read the directions carefully, and ask your doctor or other care provider to review them with you. Prescriptions Printed Refills  
 pneumococcal 13 daysi conj dip (PREVNAR-13) 0.5 mL syrg injection 0 Si.5 mL by IntraMUSCular route once for 1 dose. Class: Print Route: IntraMUSCular  
 varicella zoster vacine live (ZOSTAVAX) 19,400 unit/0.65 mL susr injection 0 Si Vial by SubCUTAneous route once for 1 dose. Class: Print Route: SubCUTAneous Follow-up Instructions Return in about 6 months (around 2018) for Follow up. To-Do List   
 2017 Imaging:  DEXA BONE DENSITY STUDY AXIAL Patient Instructions Omron BP machine (arm) plug in Exercise 1: 
The 4-7-8 (or Relaxing Breath) Exercise This exercise is utterly simple, takes almost no time, requires no equipment and can be done anywhere. Although you can do the exercise in any position, sit with your back straight while learning the exercise. Place the tip of your tongue against the ridge of tissue just behind your upper front teeth, and keep it there through the entire exercise. You will be exhaling through your mouth around your tongue; try pursing your lips slightly if this seems awkward. ? Exhale completely through your mouth, making a whoosh sound. ? Close your mouth and inhale quietly through your nose to a mental count of four. ? Hold your breath for a count of seven. ? Exhale completely through your mouth, making a whoosh sound to a count of eight. ? This is one breath. Now inhale again and repeat the cycle three more times for a total of four breaths. Note that you always inhale quietly through your nose and exhale audibly through your mouth. The tip of your tongue stays in position the whole time. Exhalation takes twice as long as inhalation.  The absolute time you spend on each phase is not important; the ratio of 4:7:8 is important. If you have trouble holding your breath, speed the exercise up but keep to the ratio of 4:7:8 for the three phases. With practice you can slow it all down and get used to inhaling and exhaling more and more deeply. This exercise is a natural tranquilizer for the nervous system. Unlike tranquilizing drugs, which are often effective when you first take them but then lose their power over time, this exercise is subtle when you first try it but gains in power with repetition and practice. Do it at least twice a day. You cannot do it too frequently. Do not do more than four breaths at one time for the first month of practice. Later, if you wish, you can extend it to eight breaths. If you feel a little lightheaded when you first breathe this way, do not be concerned; it will pass. Once you develop this technique by practicing it every day, it will be a very useful tool that you will always have with you. Use it whenever anything upsetting happens - before you react. Use it whenever you are aware of internal tension. Use it to help you fall asleep. This exercise cannot be recommended too highly. Everyone can benefit from it. Exercise 2: 
Breath Counting If you want to get a feel for this challenging work, try your hand at breath counting, a deceptively simple technique much used in [place] [address] practice. Sit in a comfortable position with the spine straight and head inclined slightly forward. Gently close your eyes and take a few deep breaths. Then let the breath come naturally without trying to influence it. Ideally it will be quiet and slow, but depth and rhythm may vary. ? To begin the exercise, count \"one\" to yourself as you exhale. ? The next time you exhale, count \"two,\" and so on up to Ketan. \" 
? Then begin a new cycle, counting \"one\" on the next exhalation. Never count higher than \"five,\" and count only when you exhale.  You will know your attention has wandered when you find yourself up to \"eight,\" \"12,\" even \"19. \" Try to do 10 minutes of this form of meditation. Taken from Shon Ordonez MD [facility] of [address] Search Pravin Ordonez breathing exercises on google and you will see a video Introducing [facility]! Dear Carol Ladd: Thank you for requesting a [employer] account. Our records indicate that you already have an active [employer] account. You can access your account anytime at [email or web address] Did you know that you can access your hospital and ER discharge instructions at any time in [employer]? You can also review all of your test results from your hospital stay or ER visit. Additional Information If you have questions, please visit the Frequently Asked Questions section of the [employer] website at [email or web address] Remember, [employer] is NOT to be used for urgent needs. For medical emergencies, dial 911. Now available from your iPhone and Android! Please provide this summary of care documentation to your next provider. Your primary care clinician is listed as Victoriano Dueñas. If you have any questions after today's visit, please call [phone].

## 2017-08-11 NOTE — PROGRESS NOTES
Chief Complaint   Patient presents with   [facility].    Complete Physical        1. Have you been to the ER, urgent care clinic since your last visit? no  Hospitalized since your last visit? No    2. Have you seen or consulted any other health care providers outside of the [facility]"" since your last visit? Yes, Colonoscopy Include any pap smears or colon screening.  No

## 2017-08-21 ENCOUNTER — HOSPITAL ENCOUNTER (OUTPATIENT)
Dept: MAMMOGRAPHY | Age: 72
Discharge: HOME | End: 2017-08-21
Payer: COMMERCIAL

## 2017-08-21 DIAGNOSIS — M85.89 OSTEOPENIA OF MULTIPLE SITES: ICD-10-CM

## 2017-08-21 PROCEDURE — 77080 DXA BONE DENSITY AXIAL: CPT

## 2017-08-21 NOTE — PROGRESS NOTES
Your DXA looked great showing only osteopenia and not osteoporosis.   Continue weight bearing exercise and vit D  Message sent to patient via [employer]:  August 21, 2017

## 2017-08-31 DIAGNOSIS — I10 ESSENTIAL HYPERTENSION: ICD-10-CM

## 2017-08-31 RX ORDER — LOSARTAN POTASSIUM 100 MG/1
TABLET ORAL
Qty: 30 TAB | Refills: 5 | Status: SHIPPED | OUTPATIENT
Start: 2017-08-31 | End: 2017-10-03 | Stop reason: SDUPTHER

## 2017-10-04 RX ORDER — ESTRADIOL 1 MG/1
1 TABLET ORAL DAILY
Qty: 90 TAB | Refills: 2 | Status: SHIPPED | OUTPATIENT
Start: 2017-10-04 | End: 2018-02-12 | Stop reason: ALTCHOICE

## 2017-10-04 NOTE — TELEPHONE ENCOUNTER
[facility] pharmacy faxed a request to change the prescription to a 90 day supply due to insurance.

## 2017-10-31 ENCOUNTER — HOSPITAL ENCOUNTER (OUTPATIENT)
Dept: MAMMOGRAPHY | Age: 72
Discharge: HOME | End: 2017-10-31
Attending: OBSTETRICS & GYNECOLOGY
Payer: COMMERCIAL

## 2017-10-31 DIAGNOSIS — Z12.31 VISIT FOR SCREENING MAMMOGRAM: ICD-10-CM

## 2017-10-31 PROCEDURE — 77063 BREAST TOMOSYNTHESIS BI: CPT

## 2017-11-13 RX ORDER — PRAVASTATIN SODIUM 20 MG/1
TABLET ORAL
Qty: 90 TAB | Refills: 3 | Status: SHIPPED | OUTPATIENT
Start: 2017-11-13 | End: 2018-11-17 | Stop reason: SDUPTHER

## 2017-11-13 NOTE — TELEPHONE ENCOUNTER
Pharmacy is requesting 80 day supply     Last office visit- 8/11/17  Next office visit- 2/12/18  Last Refill- 5/15/17    Requested Prescriptions     Pending Prescriptions Disp Refills    pravastatin (PRAVACHOL) 20 mg tablet 90 Tab 1

## 2018-01-16 ENCOUNTER — OFFICE VISIT (OUTPATIENT)
Dept: GYNECOLOGY | Age: 73
End: 2018-01-16

## 2018-01-16 VITALS
BODY MASS INDEX: 28.07 KG/M2 | SYSTOLIC BLOOD PRESSURE: 146 MMHG | HEIGHT: 64 IN | WEIGHT: 164.4 LBS | DIASTOLIC BLOOD PRESSURE: 75 MMHG | HEART RATE: 68 BPM

## 2018-01-16 DIAGNOSIS — C56.1 MALIGNANT NEOPLASM OF RIGHT OVARY (HCC): Primary | ICD-10-CM

## 2018-01-16 NOTE — PROGRESS NOTES
[address] [place] [address] [address]ias [address], [address] [facility]  [phone]  MD Jennifer Ghosh MD  Office Visit    Patient ID:  Name: Ila Hannon  MRN: [id number]  : 1945/72 y.o. Visit date: 2018    INTERVAL HISTORY:  Ila Hannon is a  female with a diagnosis of stage IC, grade 3, ovarian carcinoma (clear cell). She is s/p surgical resection and has now completed 6 cycles of Taxol and Carboplatin chemotherapy. She tolerated chemotherapy quite well. Her post-treatment PET/CT in 2013 showed no evidence of residual or metastatic disease. She presents today for routine surveillance. She is doing well and is without complaints. She denies any vaginal bleeding or discharge, any pelvic or abdominal pain, or any changes in her bowel or bladder habits. Her most recent CA-125 in 2017 was normal.  Her last CT of the abdomen/pelvis in 2015 showed no evidence of disease. PMH:  Past Medical History:   Diagnosis Date    Adopted     Adverse effect of anesthesia     tremors and shaking after anesthesia/\"taks silly\"    BRCA negative 2013    Dr. Allison Hayes Cervical arthritis [facility])     History of ovarian cancer     surgery/chemotherapy    Hypercholesterolemia     Hypertension     Ill-defined condition     hx passing out from dehydration    Other anxiety states     Other ill-defined conditions(659.89) 2010    BOWEL OBSTRUCTION  ? IMPACTION    Unspecified adverse effect of anesthesia     TREMORS AFTERWARDS     PSH:  Past Surgical History:   Procedure Laterality Date    COLONOSCOPY N/A 2017    COLON[facility]an [facility] at [facility] ENDOSCOPY    HX CHOLECYSTECTOMY      HX DILATION AND CURETTAGE      HX GYN  13    RSO    HX GYN  13    TLH/LSO, omentectomy, lymph node dissection    HX TONSILLECTOMY      AS CHILD    HX TUBAL LIGATION      HX VASCULAR ACCESS placed and removed    HX WISDOM TEETH EXTRACTION       SOC:  Social History     Social History    Marital status:      Spouse name: N/A    Number of children: N/A    Years of education: N/A     Occupational History    Banking Retired     Social History Main Topics    Smoking status: Never Smoker    Smokeless tobacco: Never Used    Alcohol use 6.0 oz/week     10 Glasses of wine per week      Comment: 1-2 glasses nightly    Drug use: No    Sexual activity: No     Other Topics Concern    Exercise Yes     walking 1/2 - 1 mile daily     Social History Narrative    . Adopted by great aunt and uncle. Retired from [employer]. Two adult sons, one currently at home with her with his wife and grandchild. Family History  Family History   Problem Relation Age of Onset   [place]eth [place] Stroke Father    [place]eth [place] Hypertension Father    [place]eth [place] Parkinsonism Mother     Hypertension Mother     Dementia Mother     Diabetes Other      Maternal great aunt    Heart Attack Other      maternal great aunt    Heart Disease Other      maternal great aunt    Drug Abuse Son      opioid     Medications:  Current Outpatient Prescriptions on File Prior to Visit   Medication Sig Dispense Refill    pravastatin (PRAVACHOL) 20 mg tablet TAKE ONE TABLET BY M0UTH EVERY NIGHT 90 Tab 3    estradiol (ESTRACE) 1 mg tablet Take 1 Tab by mouth daily. 90 Tab 2    losartan (COZAAR) 100 mg tablet Take 1 Tab by mouth daily. 90 Tab 5    FOLIC ACID/MULTIVIT-MIN/LUTEIN (CENTRUM SILVER PO) Take  by mouth. Takes one po once daily.  coenzyme q10 (CO Q-10) 300 mg cap Take 300 mg by mouth daily.  DOCOSAHEXANOIC ACID/EPA (FISH OIL PO) Take 1,200 mg by mouth daily.  estradiol (ESTRACE) 1 mg tablet TAKE ONE TABLET BY MOUTH DAILY 30 Tab 12    escitalopram oxalate (LEXAPRO) 10 mg tablet TAKE 1 TABLET EVERY DAY 90 Tab 3    cetirizine (ZYRTEC) 10 mg tablet Take 10 mg by mouth daily.       estradiol (ESTRACE) 0.01 % (0.1 mg/gram) vaginal cream Insert 1 g into vagina two (2) times a week. SUN and WED      ascorbic acid (VITAMIN C) 500 mg tablet Take 250 mg by mouth daily. No current facility-administered medications on file prior to visit. Allergies: Allergies   Allergen Reactions    Simvastatin Myalgia    Codeine Other (comments)     Unable to function    Lidocaine Hives       ROS:  Negative except for that mentioned above. OBJECTIVE:    PHYSICAL EXAM  VITAL SIGNS: Visit Vitals    /75 (BP 1 Location: Right arm, BP Patient Position: Sitting)    Pulse 68    Ht 5' 4.02\" (1.626 m)    Wt 164 lb 6.4 oz (74.6 kg)    BMI 28.21 kg/m2      GENERAL ZOILA: in no apparent distress and well developed and well nourished   HEENT: within normal limits   RESPIRATORY: lungs clear to auscultation and percussion   CARDIOVASC: Regular rate and rhythm without [facility]   GASTROINT: soft, non-tender, without masses or organomegaly   MUSCULOSKEL: no joint tenderness, deformity or swelling   EXTREMITIES: extremities normal, atraumatic, no cyanosis or edema   PELVIC: Normal external genitalia. Normal vagina. Uterus, cervix, and adnexa surgically absent. No masses or nodularity. RECTAL: Exam deferred.    JAH SURVEY: Cervical, supraclavicular, and axillary nodes normal.   NEURO: Grossly normal       Lab Results   Component Value Date/Time    WBC 10.2 07/11/2017 11:21 AM    HGB 13.8 07/11/2017 11:21 AM    HCT 41.8 07/11/2017 11:21 AM    PLATELET 985 68/63/5964 11:21 AM    MCV 93 07/11/2017 11:21 AM     Lab Results   Component Value Date/Time    Sodium 140 08/03/2017 09:28 AM    Potassium 4.7 08/03/2017 09:28 AM    Chloride 100 08/03/2017 09:28 AM    CO2 20 08/03/2017 09:28 AM    Anion gap 4 09/25/2014 03:28 PM    Glucose 83 08/03/2017 09:28 AM    BUN 12 08/03/2017 09:28 AM    Creatinine 0.68 08/03/2017 09:28 AM    BUN/Creatinine ratio 18 08/03/2017 09:28 AM    GFR est  08/03/2017 09:28 AM    GFR est non-AA 90 08/03/2017 09:28 AM    Calcium 9.3 08/03/2017 09:28 AM       CT of abdomen/pelvis (7/16/15)  Limited images of the lung bases demonstrate a 3 mm nodule in the right    lower lobe (series 3, image 6) that is stable dating back to least a    December 2009. There is no new lung nodule, mass, or consolidation. The    heart size is normal. There is no pericardial or pleural effusion.     The liver, spleen, pancreas, and adrenal glands are normal. The kidneys are    symmetric without hydronephrosis. The gall bladder is surgically absent    without intra- or extra-hepatic biliary dilatation.        There are no dilated bowel loops.  The appendix is normal.  There are no    enlarged lymph nodes.  There is no free fluid or free air.     The urinary bladder is normal.  There is no pelvic mass.  The bony    structures are age-appropriate. There is no aggressive blastic or lytic    osseous lesion.          IMPRESSION:    Stable exam with no evidence for disease recurrence or metastatic disease in    the abdomen or pelvis. IMPRESSION AND PLAN:  Ila Hannon has a diagnosis of stage IC, grade 3, ovarian CA (clear cell), managed with surgical resection followed by adjuvant Taxol and Carboplatin chemotherapy. She has no evidence of disease based upon today's examination. I will plan to see her back in 6 months for continued surveillance.         Brijesh Baird MD  1/16/2018/10:56 AM

## 2018-01-16 NOTE — PROGRESS NOTES
Six month check up. Patient states no abnormal spotting or bleeding. Patient states no questions or concerns for today's visit.

## 2018-01-17 LAB — CANCER AG125 SERPL-ACNC: 11.4 U/ML (ref 0–38.1)

## 2018-02-12 ENCOUNTER — OFFICE VISIT (OUTPATIENT)
Dept: INTERNAL MEDICINE CLINIC | Age: 73
End: 2018-02-12

## 2018-02-12 VITALS
HEART RATE: 72 BPM | OXYGEN SATURATION: 97 % | TEMPERATURE: 98.3 F | HEIGHT: 64 IN | WEIGHT: 161.7 LBS | DIASTOLIC BLOOD PRESSURE: 80 MMHG | BODY MASS INDEX: 27.61 KG/M2 | SYSTOLIC BLOOD PRESSURE: 120 MMHG | RESPIRATION RATE: 16 BRPM

## 2018-02-12 DIAGNOSIS — I10 ESSENTIAL HYPERTENSION: Primary | ICD-10-CM

## 2018-02-12 DIAGNOSIS — E78.2 HYPERCHOLESTEROLEMIA WITH HYPERTRIGLYCERIDEMIA: ICD-10-CM

## 2018-02-12 DIAGNOSIS — E55.9 VITAMIN D DEFICIENCY: ICD-10-CM

## 2018-02-12 DIAGNOSIS — F41.9 ANXIETY: ICD-10-CM

## 2018-02-12 RX ORDER — ESCITALOPRAM OXALATE 10 MG/1
TABLET ORAL
Qty: 90 TAB | Refills: 3 | Status: SHIPPED | OUTPATIENT
Start: 2018-02-12 | End: 2019-02-15 | Stop reason: SDUPTHER

## 2018-02-12 NOTE — PROGRESS NOTES
Hypertension (6 month follow up)       HPI:  Gadiel Saunders is a 81y.o. year old female who is here for a follow up visit. She was last seen by me on 6 months ago  She reports the following:    Hyperlipidemia    Patient has history of hyperlipidemia that is being managed with medications. She has been taking her statin cholesterol medication regularly without side effects such as myalgias or upper abdominal pain, nausea or jaundice. Lab Results   Component Value Date/Time    Cholesterol, total 220 (H) 08/03/2017 09:28 AM    HDL Cholesterol 89 08/03/2017 09:28 AM    LDL, calculated 106 (H) 08/03/2017 09:28 AM    VLDL, calculated 25 08/03/2017 09:28 AM    Triglyceride 126 08/03/2017 09:28 AM    CHOL/HDL Ratio 2.7 03/31/2010 07:33 AM         Hypertension    Patient has a history of HTN. The patient is taking hypertensive medications compliantly without side effects. Denies chest pain, dyspnea, edema, or symptoms of TIA's. BP Readings from Last 3 Encounters:   02/12/18 120/80   01/16/18 146/75   08/11/17 122/66         Depression/Anxiety    Compliant with medications. No impulsive thoughts and/or side effects with medications      is 11.4      Wt Readings from Last 3 Encounters:   02/12/18 161 lb 11.2 oz (73.3 kg)   01/16/18 164 lb 6.4 oz (74.6 kg)   08/11/17 171 lb (77.6 kg)        Lost 10 lbs with new diet. Sometimes gets dizzy when getting up or prolonged standing. Lasts seconds but also may be related to her skipping meals at times. She lost weight following 's diet. Depression/Anxiety    Compliant with medications. No impulsive thoughts and/or side effects with medications           Assessment and Plan        1. Essential hypertension  Tolerating medication. Denies dizziness that is positional, SOB, or chest pain. Understands the importance of compliance to reduce risk of future heart failure.    Agreed to call if any of above symptoms develop and  stay on current regimen of    Key CAD CHF Meds             losartan (COZAAR) 100 mg tablet  (Taking) Take 1 Tab by mouth daily. 2. Hypercholesterolemia with hypertriglyceridemia  The nature of cardiac risk has been fully discussed with this patient. I have made her aware of her LDL target goal given her cardiovascular risk analysis. I have discussed the appropriate diet. The need for lifelong compliance in order to reduce risk is stressed. A regular exercise program is recommended to help achieve and maintain normal body weight, fitness and improve lipid balance. The risks and benefits of medications were discussed. Last cholesterol labs reviewed with patient. Patient made aware to get liver checked every 6 months. Continue with  zocor 10    3. Anxiety  Reviewed side effects of medication and states anxiety is stable. No impulsive thoughts, sleep is not affected by medication. Exercise helps reduce anxiety. No GI symptoms. Pt instructed to call if above symptoms and agreed to stay on     Key Psychotherapeutic Meds             escitalopram oxalate (LEXAPRO) 10 mg tablet  (Taking) TAKE 1 TABLET EVERY DAY           Overall doing well and she aspires to come off some of her meds as she is losing weight and eating healthier. She should not skip meals and her dizziness should improve.           Visit Vitals    /80 (BP 1 Location: Left arm, BP Patient Position: Sitting)    Pulse 72    Temp 98.3 °F (36.8 °C) (Oral)    Resp 16    Ht 5' 4\" (1.626 m)    Wt 161 lb 11.2 oz (73.3 kg)    SpO2 97%    BMI 27.76 kg/m2       Historical Data    Past Medical History:   Diagnosis Date    Adopted     Adverse effect of anesthesia     tremors and shaking after anesthesia/\"taks silly\"    BRCA negative 5/2013    Dr. Clement Dowell Cervical arthritis [facility])     History of ovarian cancer 2013    surgery/chemotherapy    Hypercholesterolemia     Hypertension     Ill-defined condition     hx passing out from dehydration    Other anxiety states  Other ill-defined conditions(799.89) 2010    BOWEL OBSTRUCTION  ? IMPACTION    Unspecified adverse effect of anesthesia     TREMORS AFTERWARDS       Past Surgical History:   Procedure Laterality Date    COLONOSCOPY N/A 8/2/2017    COLONOSCOPY performed by Johanna Tate MD at [address] HX CHOLECYSTECTOMY  2000    HX DILATION AND CURETTAGE      HX GYN  1/11/13    RSO    HX GYN  2/13/13    TLH/LSO, omentectomy, lymph node dissection    HX TONSILLECTOMY      AS CHILD    HX TUBAL LIGATION  1978    HX VASCULAR ACCESS      placed and removed    HX WISDOM TEETH EXTRACTION         Outpatient Encounter Prescriptions as of 2/12/2018   Medication Sig Dispense Refill    pravastatin (PRAVACHOL) 20 mg tablet TAKE ONE TABLET BY M0UTH EVERY NIGHT 90 Tab 3    losartan (COZAAR) 100 mg tablet Take 1 Tab by mouth daily. 90 Tab 5    FOLIC ACID/MULTIVIT-MIN/LUTEIN (CENTRUM SILVER PO) Take  by mouth. Takes one po once daily.  coenzyme q10 (CO Q-10) 300 mg cap Take 300 mg by mouth daily.  DOCOSAHEXANOIC ACID/EPA (FISH OIL PO) Take 1,200 mg by mouth daily.  estradiol (ESTRACE) 1 mg tablet TAKE ONE TABLET BY MOUTH DAILY 30 Tab 12    escitalopram oxalate (LEXAPRO) 10 mg tablet TAKE 1 TABLET EVERY DAY 90 Tab 3    cetirizine (ZYRTEC) 10 mg tablet Take 10 mg by mouth daily.  estradiol (ESTRACE) 0.01 % (0.1 mg/gram) vaginal cream Insert 1 g into vagina two (2) times a week. SUN and WED      ascorbic acid (VITAMIN C) 500 mg tablet Take 250 mg by mouth daily.  estradiol (ESTRACE) 1 mg tablet Take 1 Tab by mouth daily. 90 Tab 2     No facility-administered encounter medications on file as of 2/12/2018.          Allergies   Allergen Reactions    Simvastatin Myalgia    Codeine Other (comments)     Unable to function    Lidocaine Hives        Social History     Social History    Marital status:      Spouse name: N/A    Number of children: N/A    Years of education: N/A     Occupational History    Banking Retired     Social History Main Topics    Smoking status: Never Smoker    Smokeless tobacco: Never Used    Alcohol use 6.0 oz/week     10 Glasses of wine per week      Comment: 1-2 glasses nightly    Drug use: No    Sexual activity: No     Other Topics Concern    Exercise Yes     walking 1/2 - 1 mile daily     Social History Narrative    . Adopted by great aunt and uncle. Retired from [employer] industry. Two adult sons, one currently at home with her with his wife and grandchild. family history includes Dementia in her mother; Diabetes in an other family member; Drug Abuse in her son; Heart Attack in an other family member; Heart Disease in an other family member; Hypertension in her father and mother; Parkinsonism in her mother; Stroke in her father. Review of Systems   Constitutional: Negative for weight loss. Eyes: Negative for blurred vision. Respiratory: Negative for shortness of breath. Cardiovascular: Negative for chest pain. Gastrointestinal: Negative for abdominal pain. Genitourinary: Negative for dysuria and frequency. Skin: Negative for rash. Neurological: Negative for dizziness, focal weakness, weakness and headaches. Endo/Heme/Allergies: Negative for environmental allergies. Does not bruise/bleed easily. Physical Exam   Constitutional: She appears well-developed and well-nourished. She is active. Non-toxic appearance. She does not have a sickly appearance. She does not appear ill. No distress. Eyes: Conjunctivae are normal. Right eye exhibits no discharge. Cardiovascular: Normal rate, regular rhythm, S1 normal, S2 normal, normal heart sounds and normal pulses. Exam reveals no gallop and no friction rub. Pulmonary/Chest: Effort normal and breath sounds normal. No respiratory distress. Abdominal: Soft. Bowel sounds are normal.   Musculoskeletal: She exhibits no edema or deformity. Neurological: She is alert.    Skin: Skin is warm and dry. No rash noted. No pallor. Psychiatric: She has a normal mood and affect. Her behavior is normal.   Vitals reviewed. Ortho Exam      No orders of the defined types were placed in this encounter. I have reviewed the patient's medical history in detail and updated the computerized patient record. We had a prolonged discussion about these complex clinical issues and went over the various important aspects to consider. All questions were answered. Advised her to call back or return to office if symptoms do not improve, change in nature, or persist.    She was given an after visit summary or informed of [employer] which includes patient instructions, diagnoses, current medications, & vitals. She expressed understanding with the diagnosis and plan.

## 2018-02-12 NOTE — PROGRESS NOTES
Chief Complaint   Patient presents with    Hypertension     6 month follow up     1. Have you been to the ER, urgent care clinic since your last visit? Hospitalized since your last visit? No    2. Have you seen or consulted any other health care providers outside of the [address] [place] [address] since your last visit? Include any pap smears or colon screening.  No

## 2018-02-16 LAB
ALBUMIN SERPL-MCNC: 4 G/DL (ref 3.5–4.8)
ALBUMIN/GLOB SERPL: 1.4 {RATIO} (ref 1.2–2.2)
ALP SERPL-CCNC: 61 IU/L (ref 39–117)
ALT SERPL-CCNC: 24 IU/L (ref 0–32)
APPEARANCE UR: ABNORMAL
AST SERPL-CCNC: 22 IU/L (ref 0–40)
BASOPHILS # BLD AUTO: 0 X10E3/UL (ref 0–0.2)
BASOPHILS NFR BLD AUTO: 1 %
BILIRUB SERPL-MCNC: 0.3 MG/DL (ref 0–1.2)
BILIRUB UR QL STRIP: NEGATIVE
BUN SERPL-MCNC: 20 MG/DL (ref 8–27)
BUN/CREAT SERPL: 34 (ref 12–28)
CALCIUM SERPL-MCNC: 9.3 MG/DL (ref 8.7–10.3)
CHLORIDE SERPL-SCNC: 99 MMOL/L (ref 96–106)
CHOLEST SERPL-MCNC: 203 MG/DL (ref 100–199)
CO2 SERPL-SCNC: 24 MMOL/L (ref 18–29)
COLOR UR: YELLOW
CREAT SERPL-MCNC: 0.59 MG/DL (ref 0.57–1)
EOSINOPHIL # BLD AUTO: 0.3 X10E3/UL (ref 0–0.4)
EOSINOPHIL NFR BLD AUTO: 5 %
ERYTHROCYTE [DISTWIDTH] IN BLOOD BY AUTOMATED COUNT: 14.1 % (ref 12.3–15.4)
GFR SERPLBLD CREATININE-BSD FMLA CKD-EPI: 107 ML/MIN/1.73
GFR SERPLBLD CREATININE-BSD FMLA CKD-EPI: 93 ML/MIN/1.73
GLOBULIN SER CALC-MCNC: 2.8 G/DL (ref 1.5–4.5)
GLUCOSE SERPL-MCNC: 97 MG/DL (ref 65–99)
GLUCOSE UR QL: NEGATIVE
HCT VFR BLD AUTO: 41.9 % (ref 34–46.6)
HDLC SERPL-MCNC: 82 MG/DL
HGB BLD-MCNC: 13.8 G/DL (ref 11.1–15.9)
HGB UR QL STRIP: NEGATIVE
IMM GRANULOCYTES # BLD: 0 X10E3/UL (ref 0–0.1)
IMM GRANULOCYTES NFR BLD: 0 %
INTERPRETATION, 910389: NORMAL
KETONES UR QL STRIP: NEGATIVE
LDLC SERPL CALC-MCNC: 103 MG/DL (ref 0–99)
LEUKOCYTE ESTERASE UR QL STRIP: NEGATIVE
LYMPHOCYTES # BLD AUTO: 2.5 X10E3/UL (ref 0.7–3.1)
LYMPHOCYTES NFR BLD AUTO: 35 %
MCH RBC QN AUTO: 30.6 PG (ref 26.6–33)
MCHC RBC AUTO-ENTMCNC: 32.9 G/DL (ref 31.5–35.7)
MCV RBC AUTO: 93 FL (ref 79–97)
MICRO URNS: ABNORMAL
MONOCYTES # BLD AUTO: 0.6 X10E3/UL (ref 0.1–0.9)
MONOCYTES NFR BLD AUTO: 9 %
NEUTROPHILS # BLD AUTO: 3.7 X10E3/UL (ref 1.4–7)
NEUTROPHILS NFR BLD AUTO: 50 %
NITRITE UR QL STRIP: NEGATIVE
PH UR STRIP: 5.5 [PH] (ref 5–7.5)
PLATELET # BLD AUTO: 368 X10E3/UL (ref 150–379)
POTASSIUM SERPL-SCNC: 4.8 MMOL/L (ref 3.5–5.2)
PROT SERPL-MCNC: 6.8 G/DL (ref 6–8.5)
PROT UR QL STRIP: NEGATIVE
RBC # BLD AUTO: 4.51 X10E6/UL (ref 3.77–5.28)
SODIUM SERPL-SCNC: 139 MMOL/L (ref 134–144)
SP GR UR: 1.02 (ref 1–1.03)
TRIGL SERPL-MCNC: 92 MG/DL (ref 0–149)
UROBILINOGEN UR STRIP-MCNC: 0.2 MG/DL (ref 0.2–1)
VLDLC SERPL CALC-MCNC: 18 MG/DL (ref 5–40)
WBC # BLD AUTO: 7.2 X10E3/UL (ref 3.4–10.8)

## 2018-02-16 NOTE — PROGRESS NOTES
Your numbers look very good.   Keep up the good work  Message sent to patient via [employer]:  February 16, 2018

## 2018-07-17 ENCOUNTER — HOSPITAL ENCOUNTER (OUTPATIENT)
Dept: LAB | Age: 73
Discharge: HOME | End: 2018-07-17
Payer: COMMERCIAL

## 2018-07-17 ENCOUNTER — OFFICE VISIT (OUTPATIENT)
Dept: GYNECOLOGY | Age: 73
End: 2018-07-17

## 2018-07-17 VITALS
HEIGHT: 64 IN | DIASTOLIC BLOOD PRESSURE: 90 MMHG | WEIGHT: 162.8 LBS | HEART RATE: 64 BPM | SYSTOLIC BLOOD PRESSURE: 154 MMHG | BODY MASS INDEX: 27.79 KG/M2

## 2018-07-17 DIAGNOSIS — C56.1 MALIGNANT NEOPLASM OF RIGHT OVARY (HCC): Primary | ICD-10-CM

## 2018-07-17 PROCEDURE — 88175 CYTOPATH C/V AUTO FLUID REDO: CPT | Performed by: OBSTETRICS & GYNECOLOGY

## 2018-07-17 NOTE — PROGRESS NOTES
[address] [place] [address] [address] [facility]  [phone]  MD Petty Torres MD  Office Visit    Patient ID:  Name: Concepción Franco  MRN: [id number]  : 1945/72 y.o. Visit date: 2018    INTERVAL HISTORY:  Concepción Franco is a  female with a diagnosis of stage IC, grade 3, ovarian carcinoma (clear cell). She is s/p surgical resection and has now completed 6 cycles of Taxol and Carboplatin chemotherapy. She tolerated chemotherapy quite well. Her post-treatment PET/CT in 2013 showed no evidence of residual or metastatic disease. She presents today for routine surveillance. She is doing well and is without complaints. She denies any vaginal bleeding or discharge, any pelvic or abdominal pain, or any changes in her bowel or bladder habits. Her most recent CA-125 in 2018 was normal.  Her last CT of the abdomen/pelvis in 2015 showed no evidence of disease. PMH:  Past Medical History:   Diagnosis Date    Adopted     Adverse effect of anesthesia     tremors and shaking after anesthesia/\"taks silly\"    BRCA negative 2013    Dr. Parish Lock Cervical arthritis [facility])     History of ovarian cancer 2013    surgery/chemotherapy    Hypercholesterolemia     Hypertension     Ill-defined condition     hx passing out from dehydration    Other anxiety states     Other ill-defined conditions(739.89) 2010    BOWEL OBSTRUCTION  ? IMPACTION    Unspecified adverse effect of anesthesia     TREMORS AFTERWARDS     PSH:  Past Surgical History:   Procedure Laterality Date    COLONOSCOPY N/A 2017    COL[facility] at [facility]OSCOPY    HX CHOLECYSTECTOMY      HX DILATION AND CURETTAGE      HX GYN  13    RSO    HX GYN  13    TLH/LSO, omentectomy, lymph node dissection    HX TONSILLECTOMY      AS CHILD    HX TUBAL LIGATION      HX VASCULAR ACCESS placed and removed    HX WISDOM TEETH EXTRACTION       SOC:  Social History     Social History    Marital status:      Spouse name: N/A    Number of children: N/A    Years of education: N/A     Occupational History    Banking Retired     Social History Main Topics    Smoking status: Never Smoker    Smokeless tobacco: Never Used    Alcohol use 6.0 oz/week     10 Glasses of wine per week      Comment: 1-2 glasses nightly    Drug use: No    Sexual activity: No     Other Topics Concern    Exercise Yes     walking 1/2 - 1 mile daily     Social History Narrative    . Adopted by great aunt and uncle. Retired from [employer] industry. Two adult sons, one currently at home with her with his wife and grandchild. Family History  Family History   Problem Relation Age of Onset   Ellenburg Shaker Stroke Father    Ellenburg Shaker Hypertension Father    Ellenburg Shaker Parkinsonism Mother     Hypertension Mother     Dementia Mother     Diabetes Other      Maternal great aunt    Heart Attack Other      maternal great aunt    Heart Disease Other      maternal great aunt    Drug Abuse Son      opioid     Medications:  Current Outpatient Prescriptions on File Prior to Visit   Medication Sig Dispense Refill    escitalopram oxalate (LEXAPRO) 10 mg tablet TAKE 1 TABLET EVERY DAY 90 Tab 3    pravastatin (PRAVACHOL) 20 mg tablet TAKE ONE TABLET BY M0UTH EVERY NIGHT 90 Tab 3    losartan (COZAAR) 100 mg tablet Take 1 Tab by mouth daily. 90 Tab 5    FOLIC ACID/MULTIVIT-MIN/LUTEIN (CENTRUM SILVER PO) Take  by mouth. Takes one po once daily.  coenzyme q10 (CO Q-10) 300 mg cap Take 300 mg by mouth daily.  DOCOSAHEXANOIC ACID/EPA (FISH OIL PO) Take 1,200 mg by mouth daily.  estradiol (ESTRACE) 1 mg tablet TAKE ONE TABLET BY MOUTH DAILY 30 Tab 12    cetirizine (ZYRTEC) 10 mg tablet Take 10 mg by mouth daily.  estradiol (ESTRACE) 0.01 % (0.1 mg/gram) vaginal cream Insert 1 g into vagina two (2) times a week.  SUN and WED      ascorbic acid (VITAMIN C) 500 mg tablet Take 250 mg by mouth daily. No current facility-administered medications on file prior to visit. Allergies: Allergies   Allergen Reactions    Simvastatin Myalgia    Codeine Other (comments)     Unable to function    Lidocaine Hives       ROS:  Negative except for that mentioned above. OBJECTIVE:    PHYSICAL EXAM  VITAL SIGNS: Visit Vitals    /90 (BP 1 Location: Left arm, BP Patient Position: Sitting)    Pulse 64    Ht 5' 4.02\" (1.626 m)    Wt 162 lb 12.8 oz (73.8 kg)    BMI 27.93 kg/m2      GENERAL ZOILA: in no apparent distress and well developed and well nourished   HEENT: within normal limits   RESPIRATORY: lungs clear to auscultation and percussion   CARDIOVASC: Regular rate and rhythm without [facility]   GASTROINT: soft, non-tender, without masses or organomegaly   MUSCULOSKEL: no joint tenderness, deformity or swelling   EXTREMITIES: extremities normal, atraumatic, no cyanosis or edema   PELVIC: Normal external genitalia. Normal vagina. Uterus, cervix, and adnexa surgically absent. No masses or nodularity. RECTAL: Exam deferred.    JAH SURVEY: Cervical, supraclavicular, and axillary nodes normal.   NEURO: Grossly normal       Lab Results   Component Value Date/Time    WBC 7.2 02/15/2018 08:35 AM    HGB 13.8 02/15/2018 08:35 AM    HCT 41.9 02/15/2018 08:35 AM    PLATELET 152 78/08/4340 08:35 AM    MCV 93 02/15/2018 08:35 AM     Lab Results   Component Value Date/Time    Sodium 139 02/15/2018 08:35 AM    Potassium 4.8 02/15/2018 08:35 AM    Chloride 99 02/15/2018 08:35 AM    CO2 24 02/15/2018 08:35 AM    Anion gap 4 (L) 09/25/2014 03:28 PM    Glucose 97 02/15/2018 08:35 AM    BUN 20 02/15/2018 08:35 AM    Creatinine 0.59 02/15/2018 08:35 AM    BUN/Creatinine ratio 34 (H) 02/15/2018 08:35 AM    GFR est  02/15/2018 08:35 AM    GFR est non-AA 93 02/15/2018 08:35 AM    Calcium 9.3 02/15/2018 08:35 AM       CT of abdomen/pelvis (7/16/15)  Limited images of the lung bases demonstrate a 3 mm nodule in the right    lower lobe (series 3, image 6) that is stable dating back to least a    December 2009. There is no new lung nodule, mass, or consolidation. The    heart size is normal. There is no pericardial or pleural effusion.     The liver, spleen, pancreas, and adrenal glands are normal. The kidneys are    symmetric without hydronephrosis. The gall bladder is surgically absent    without intra- or extra-hepatic biliary dilatation.        There are no dilated bowel loops.  The appendix is normal.  There are no    enlarged lymph nodes.  There is no free fluid or free air.     The urinary bladder is normal.  There is no pelvic mass.  The bony    structures are age-appropriate. There is no aggressive blastic or lytic    osseous lesion.        IMPRESSION:    Stable exam with no evidence for disease recurrence or metastatic disease in    the abdomen or pelvis. IMPRESSION AND PLAN:  Jude Disla has a diagnosis of stage IC, grade 3, ovarian CA (clear cell), managed with surgical resection followed by adjuvant Taxol and Carboplatin chemotherapy. She has no evidence of disease based upon today's examination. I will plan to see her back in one year for continued surveillance.         Rosalio Arias MD  7/17/2018/10:56 AM

## 2018-07-17 NOTE — PROGRESS NOTES
Six month check up. Patient states no abnormal spotting or bleeding. Patient states no questions or concerns for today's visit. 1. Have you been to the ER, urgent care clinic since your last visit? Hospitalized since your last visit? No    2. Have you seen or consulted any other health care providers outside of the [address] [place] [address] since your last visit? Include any pap smears or colon screening.  No

## 2018-07-18 LAB — CANCER AG125 SERPL-ACNC: 11.2 U/ML (ref 0–38.1)

## 2018-07-21 RX ORDER — ESTRADIOL 1 MG/1
TABLET ORAL
Qty: 90 TAB | Refills: 2 | Status: SHIPPED | OUTPATIENT
Start: 2018-07-21 | End: 2018-08-14

## 2018-08-12 NOTE — PROGRESS NOTES
CA-125 remains in normal range, but elevated above her baseline. Please let her know and schedule a repeat in one month. Will decide what to do after that result. right

## 2018-08-14 ENCOUNTER — OFFICE VISIT (OUTPATIENT)
Dept: INTERNAL MEDICINE CLINIC | Age: 73
End: 2018-08-14

## 2018-08-14 VITALS
OXYGEN SATURATION: 97 % | HEART RATE: 67 BPM | BODY MASS INDEX: 27.78 KG/M2 | DIASTOLIC BLOOD PRESSURE: 96 MMHG | RESPIRATION RATE: 18 BRPM | SYSTOLIC BLOOD PRESSURE: 180 MMHG | HEIGHT: 64 IN | TEMPERATURE: 97.9 F | WEIGHT: 162.7 LBS

## 2018-08-14 DIAGNOSIS — Z00.00 ROUTINE GENERAL MEDICAL EXAMINATION AT A HEALTH CARE FACILITY: Primary | ICD-10-CM

## 2018-08-14 DIAGNOSIS — E78.2 HYPERCHOLESTEROLEMIA WITH HYPERTRIGLYCERIDEMIA: ICD-10-CM

## 2018-08-14 DIAGNOSIS — I10 ESSENTIAL HYPERTENSION: ICD-10-CM

## 2018-08-14 DIAGNOSIS — F41.9 ANXIETY: ICD-10-CM

## 2018-08-14 DIAGNOSIS — Z85.43 HISTORY OF MALIGNANT NEOPLASM OF OVARY: ICD-10-CM

## 2018-08-14 RX ORDER — OLMESARTAN MEDOXOMIL AND HYDROCHLOROTHIAZIDE 40/25 40; 25 MG/1; MG/1
1 TABLET ORAL DAILY
Qty: 30 TAB | Refills: 6 | Status: SHIPPED | OUTPATIENT
Start: 2018-08-14 | End: 2018-09-06

## 2018-08-14 NOTE — PROGRESS NOTES
Reviewed record in preparation for visit and have obtained necessary documentation. Identified pt with two pt identifiers(name and ).     Chief Complaint   Patient presents with    Hypertension    Follow-up     lab       Health Maintenance Due   Topic Date Due    Glaucoma Screening   2017    Flu Vaccine  2018       Coordination of Care Questionnaire:  :     1) Have you been to an emergency room, urgent care clinic since your last visit? no   Hospitalized since your last visit? no             2) Have you seen or consulted any other health care providers outside of [facility] since your last visit? no  (Include any pap smears or colon screenings in this section.)    Visit Vitals    BP (!) 180/96 (BP 1 Location: Left arm, BP Patient Position: Sitting)    Pulse 67    Temp 97.9 °F (36.6 °C) (Oral)    Resp 18    Ht 5' 4\" (1.626 m)    Wt 162 lb 11.2 oz (73.8 kg)    SpO2 97%    BMI 27.93 kg/m2

## 2018-08-14 NOTE — PATIENT INSTRUCTIONS
Instruction on how to use steam to improve congestion    Put two tbs of baking soda in a pot (quart) of water and heat to steam.  Take off fire and place face over steam with towel over your head and pot. Allow congestion to come out of nasal passages and then come out of towel to blow your nose. Return into the steam tent. It also will help more effectively to put a few drops of peppermint oil or eucalyptus in the mixture. They key is to allow everything stuck in your sinuses and nose to come out so it is not a medium for infection. Ok to take allegra 180 mg for bad seasons. Olmesartan/Hydrochlorothiazide (By mouth)   Hydrochlorothiazide (adolfo-droe-klor-qv-RHYU-l-zide), Olmesartan Medoxomil (up-xe-GNB-tan fn-OCA-te-mil)  Treats high blood pressure. This medicine contains an angiotensin receptor blocker (ARB) and a diuretic (water pill). Brand Name(s): Benicar HCT   There may be other brand names for this medicine. When This Medicine Should Not Be Used: This medicine is not right for everyone. Do not use it if you had an allergic reaction to olmesartan or hydrochlorothiazide, or if you are pregnant. How to Use This Medicine:   Tablet  · Take your medicine as directed. Your dose may need to be changed several times to find what works best for you. · Missed dose: Take a dose as soon as you remember. If it is almost time for your next dose, wait until then and take a regular dose. Do not take extra medicine to make up for a missed dose. · Store the medicine in a closed container at room temperature, away from heat, moisture, and direct light. Drugs and Foods to Avoid:   Ask your doctor or pharmacist before using any other medicine, including over-the-counter medicines, vitamins, and herbal products. · Do not use this medicine together with aliskiren. · Ask your doctor before you use any medicine, supplement, or salt substitute that contains potassium.   · Some medicines can affect how this medicine works. Tell your doctor if you are using any of the following:  ¨ Cholestyramine, colestipol, lithium  ¨ ACE inhibitor blood pressure medicine  ¨ Diuretic (water pill)  ¨ Insulin or diabetes medicine  ¨ NSAID pain or arthritis medicine (including aspirin, celecoxib, diclofenac, ibuprofen, naproxen)  ¨ Steroid medicine (including dexamethasone, hydrocortisone, methylprednisolone, prednisolone, prednisone)  · If you also use colesevelam, take it at least 4 hours after you take this medicine. · Alcohol, narcotic pain relievers, or sleeping pills may cause you to feel more lightheaded, dizzy, or faint when used with this medicine. Warnings While Using This Medicine:   · It is not safe to take this medicine during pregnancy. It could harm an unborn baby. Tell your doctor right away if you become pregnant. · Tell your doctor if you are breastfeeding, or if you have kidney problems or trouble urinating, liver disease, heart failure, diabetes, glaucoma, gout, high cholesterol, lupus, or a history of asthma or allergies, including a sulfa or penicillin allergy. · This medicine may cause the following problems:  ¨ Vision changes  ¨ Kidney problems  ¨ Low or high levels of minerals in your blood (including potassium and sodium)  · This medicine could lower your blood pressure too much, especially when you first use it or if you are dehydrated. Stand or sit up slowly if you feel dizzy or lightheaded. · Drink plenty of fluids if you exercise, sweat more than usual, or have diarrhea or vomiting while you are using this medicine. · Your doctor will do lab tests at regular visits to check on the effects of this medicine. Keep all appointments. · Keep all medicine out of the reach of children. Never share your medicine with anyone.   Possible Side Effects While Using This Medicine:   Call your doctor right away if you notice any of these side effects:  · Allergic reaction: Itching or hives, swelling in your face or hands, swelling or tingling in your mouth or throat, chest tightness, trouble breathing  · Blistering, peeling, or red skin rash  · Change in how much or how often you urinate, bloody or cloudy urine  · Confusion, weakness, muscle twitching  · Dry mouth, increased thirst, muscle cramps, nausea, vomiting  · Eye pain, vision changes, seeing halos around lights  · Fast, slow, or uneven heartbeat, chest pain  · Lightheadedness, dizziness, or fainting  · Severe diarrhea with weight loss (could occur months to years after you start taking this medicine)  If you notice these less serious side effects, talk with your doctor:   · Cough, runny or stuffy nose, sore throat  If you notice other side effects that you think are caused by this medicine, tell your doctor. Call your doctor for medical advice about side effects. You may report side effects to FDA at 1-185-FDA-8251  © 2017 [address] Information is for End User's use only and may not be sold, redistributed or otherwise used for commercial purposes. The above information is an  only. It is not intended as medical advice for individual conditions or treatments. Talk to your doctor, nurse or pharmacist before following any medical regimen to see if it is safe and effective for you.

## 2018-08-14 NOTE — PROGRESS NOTES
HPI:  Karolyn Robledo is a 81y.o. year old female who is here for a physical.   She was last seen by me on 2/15/2018. She reports the following:    Taking medications regularly. Had decaf coffee  Wants physical today    Haven't checking BP. Feels well. No sx. Family hx of stroke. Walking regularly. Mother has dementia, PD. Tried to come off estrace and had joint issues. 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 Estrace prescribed by Dr. Nael Hopper. Depression/Anxiety    Compliant with medications. No impulsive thoughts and/or side effects with medications   doing ok with lexapro. Worries about sons. Assessment and Plan        1. Routine general medical examination at a health care facility  Pt BP a new finding. She doesn't check and needs to get a machine to check. Exercise increase. 2. Essential hypertension  Change losartan 100 mg to benicar hct 40-25 once a day  - olmesartan-hydroCHLOROthiazide (BENICAR HCT) 40-25 mg per tablet; Take 1 Tab by mouth daily. Dispense: 30 Tab; Refill: 6  Follow up in 2 weeks. The risks and benefits of the new medication were discussed as well as possible side effects. Patient is instructed to call if any new symptoms arise that are listed in the AVS printed or available on [employer] or discussed:  Low potassium, cramps, dizziness    3. Hypercholesterolemia with hypertriglyceridemia  Chol is at goal.  No hx of heart disease. Has FH. 4. Anxiety  Reviewed side effects of medication and states anxiety is stable. No impulsive thoughts, sleep is not affected by medication. Exercise helps reduce anxiety. No GI symptoms. Pt instructed to call if above symptoms and agreed to stay on  lexapro    5. History of malignant neoplasm of ovary  5 year remission. On HRT. Followed by Dr. Nael Hopper.           BP left arm 170/80  Right arm 160/86      BP Readings from Last 3 Encounters:   08/14/18 (!) 180/96   07/17/18 154/90   02/12/18 120/80      Pulse Readings from Last 3 Encounters:   08/14/18 67   07/17/18 64   02/12/18 72           Visit Vitals    BP (!) 180/96 (BP 1 Location: Left arm, BP Patient Position: Sitting)    Pulse 67    Temp 97.9 °F (36.6 °C) (Oral)    Resp 18    Ht 5' 4\" (1.626 m)    Wt 162 lb 11.2 oz (73.8 kg)    SpO2 97%    BMI 27.93 kg/m2       Historical Data    Past Medical History:   Diagnosis Date    Adopted     Adverse effect of anesthesia     tremors and shaking after anesthesia/\"taks silly\"    BRCA negative 5/2013    Dr. Curt Santos    Cervical [facility])     History of ovarian cancer 2013    surgery/chemotherapy    Hypercholesterolemia     Hypertension     Ill-defined condition     hx passing out from dehydration    Other anxiety states     Other ill-defined conditions(799.89) 2010    BOWEL OBSTRUCTION  ? IMPACTION    Unspecified adverse effect of anesthesia     TREMORS AFTERWARDS       Past Surgical History:   Procedure Laterality Date    COLONOSCOPY N/A 8/2/2017    COL[facility]COPY performed by G[facility] Richard [facility] at [facility] ENDOSCOPY    HX CHOLECYSTECTOMY  2000    HX DILATION AND CURETTAGE      HX GYN  1/11/13    RSO    HX GYN  2/13/13    TLH/LSO, omentectomy, lymph node dissection    HX TONSILLECTOMY      AS CHILD    HX TUBAL LIGATION  1978    HX VASCULAR ACCESS      placed and removed    HX WISDOM TEETH EXTRACTION         Outpatient Encounter Prescriptions as of 8/14/2018   Medication Sig Dispense Refill    olmesartan-hydroCHLOROthiazide (BENICAR HCT) 40-25 mg per tablet Take 1 Tab by mouth daily. 30 Tab 6    escitalopram oxalate (LEXAPRO) 10 mg tablet TAKE 1 TABLET EVERY DAY 90 Tab 3    pravastatin (PRAVACHOL) 20 mg tablet TAKE ONE TABLET BY M0UTH EVERY NIGHT 90 Tab 3    FOLIC ACID/MULTIVIT-MIN/LUTEIN (CENTRUM SILVER PO) Take  by mouth. Takes one po once daily.  coenzyme q10 (CO Q-10) 300 mg cap Take 300 mg by mouth daily.  DOCOSAHEXANOIC ACID/EPA (FISH OIL PO) Take 1,200 mg by mouth daily.       estradiol (ESTRACE) 1 mg tablet TAKE ONE TABLET BY MOUTH DAILY 30 Tab 12    estradiol (ESTRACE) 0.01 % (0.1 mg/gram) vaginal cream Insert 1 g into vagina two (2) times a week. SUN and WED      ascorbic acid (VITAMIN C) 500 mg tablet Take 250 mg by mouth daily.  [DISCONTINUED] estradiol (ESTRACE) 1 mg tablet TAKE 1 TAB BY MOUTH DAILY. 90 Tab 2    [DISCONTINUED] losartan (COZAAR) 100 mg tablet Take 1 Tab by mouth daily. 90 Tab 5    [DISCONTINUED] cetirizine (ZYRTEC) 10 mg tablet Take 10 mg by mouth daily. No facility-administered encounter medications on file as of 8/14/2018. Allergies   Allergen Reactions    Simvastatin Myalgia    Codeine Other (comments)     Unable to function    Lidocaine Hives        Social History     Social History    Marital status:      Spouse name: N/A    Number of children: N/A    Years of education: N/A     Occupational History    Banking Retired     Social History Main Topics    Smoking status: Never Smoker    Smokeless tobacco: Never Used    Alcohol use 6.0 oz/week     10 Glasses of wine per week      Comment: 1-2 glasses nightly    Drug use: No    Sexual activity: No     Other Topics Concern    Exercise Yes     walking 1/2 - 1 mile daily     Social History Narrative    . Adopted by great aunt and uncle. Retired from banking industry. Two adult sons, one currently at home with her with his wife and grandchild. family history includes Dementia in her mother; Diabetes in an other family member; Drug Abuse in her son; Heart Attack in an other family member; Heart Disease in an other family member; Hypertension in her father and mother; Parkinsonism in her mother; Stroke in her father. Review of Systems   Constitutional: Negative for chills, diaphoresis, fever, malaise/fatigue and weight loss. HENT: Negative for hearing loss. Respiratory: Negative for cough. Cardiovascular: Negative for chest pain.    Gastrointestinal: Negative for blood in stool and constipation. Genitourinary: Negative for dysuria, flank pain, frequency and urgency. Musculoskeletal: Negative for myalgias. Skin: Negative for rash. Neurological: Negative for dizziness, weakness and headaches. Endo/Heme/Allergies: Does not bruise/bleed easily. Physical Exam   Constitutional: She is oriented to person, place, and time. She appears well-nourished. No distress. HENT:   Right Ear: External ear normal.   Left Ear: External ear normal.   Mouth/Throat: Oropharynx is clear and moist.   Neck: Normal range of motion. Neck supple. Carotid bruit is not present. No rigidity. No thyromegaly present. Cardiovascular: Normal rate, regular rhythm and normal heart sounds. Pulmonary/Chest: Effort normal and breath sounds normal. No respiratory distress. She has no wheezes. Abdominal: Soft. Bowel sounds are normal. She exhibits no mass. There is no tenderness. Musculoskeletal: She exhibits no edema or tenderness. Lymphadenopathy:     She has no cervical adenopathy. Neurological: She is alert and oriented to person, place, and time. Skin: Skin is warm and dry. No rash noted. No erythema. Psychiatric: She has a normal mood and affect. Thought content normal.   Nursing note and vitals reviewed. Ortho Exam      Orders Placed This Encounter    olmesartan-hydroCHLOROthiazide (BENICAR HCT) 40-25 mg per tablet     Sig: Take 1 Tab by mouth daily. Dispense:  30 Tab     Refill:  6        I have reviewed the patient's medical history in detail and updated the computerized patient record. We had a prolonged discussion about these complex clinical issues and went over the various important aspects to consider. All questions were answered.      Advised her to call back or return to office if symptoms do not improve, change in nature, or persist.    She was given an after visit summary or informed of [employer] [facility] which includes patient instructions, diagnoses, current medications, & vitals. She expressed understanding with the diagnosis and plan.

## 2018-08-14 NOTE — MR AVS SNAPSHOT
[address] [facility], [address] [place] [address] 
[phone] Patient: Carole Cha MRN: [id number] :1945 Visit Information Date & Time Provider Department Dept. Phone Encounter #  
 2018 10:30 AM [phone] [id number] Follow-up Instructions Return in about 2 weeks (around 2018) for recheck blood pressure. Upcoming Health Maintenance Date Due  
 GLAUCOMA SCREENING Q2Y 2017 Influenza Age 5 to Adult 2018 BREAST CANCER SCRN MAMMOGRAM 10/31/2019 DTaP/Tdap/Td series (2 - Td) 2024 COLONOSCOPY 2027 Allergies as of 2018  Review Complete On: 2018 By: Ivette Rojas LPN Severity Noted Reaction Type Reactions Simvastatin Medium 2017   Side Effect Myalgia Codeine  2011   Systemic Other (comments) Unable to function Lidocaine  2011   Systemic Hives Current Immunizations  Reviewed on 2017 Name Date Influenza High Dose Vaccine PF 2017 Influenza Vaccine 10/8/2016, 2012 Pneumococcal Conjugate (PCV-13) 2017 Pneumococcal Polysaccharide (PPSV-23) 2013 Tdap 2014 Zoster Vaccine, Live 2017 Not reviewed this visit You Were Diagnosed With   
  
 Codes Comments Routine general medical examination at a health care facility    -  Primary ICD-10-CM: Z00.00 ICD-9-CM: V70.0 Essential hypertension     ICD-10-CM: I10 
ICD-9-CM: 401.9 Hypercholesterolemia with hypertriglyceridemia     ICD-10-CM: E78.2 ICD-9-CM: 272.2 Vitals BP Pulse Temp Resp Height(growth percentile) Weight(growth percentile) (!) 180/96 (BP 1 Location: Left arm, BP Patient Position: Sitting) 67 97.9 °F (36.6 °C) (Oral) 18 5' 4\" (1.626 m) 162 lb 11.2 oz (73.8 kg) SpO2 BMI OB Status Smoking Status 97% 27.93 kg/m2 Hysterectomy Never Smoker Vitals History BMI and BSA Data Body Mass Index Body Surface Area  
 27.93 kg/m 2 1.83 m 2 Preferred Pharmacy Pharmacy Name Phone [id number] Rojas [phone] Your Updated Medication List  
  
   
This list is accurate as of 8/14/18 10:52 AM.  Always use your most recent med list.  
  
  
  
  
 CENTRUM SILVER PO Take  by mouth. Takes one po once daily. CO Q-10 300 mg Cap Generic drug:  coenzyme q10 Take 300 mg by mouth daily. escitalopram oxalate 10 mg tablet Commonly known as:  Clayton Cortez TAKE 1 TABLET EVERY DAY  
  
 * ESTRACE 0.01 % (0.1 mg/gram) vaginal cream  
Generic drug:  estradiol Insert 1 g into vagina two (2) times a week. SUN and WED * estradiol 1 mg tablet Commonly known as:  ESTRACE  
TAKE ONE TABLET BY MOUTH DAILY FISH OIL PO Take 1,200 mg by mouth daily. olmesartan-hydroCHLOROthiazide 40-25 mg per tablet Commonly known as:  BENICAR HCT Take 1 Tab by mouth daily. pravastatin 20 mg tablet Commonly known as:  PRAVACHOL  
TAKE ONE TABLET BY M0UTH EVERY NIGHT  
  
 VITAMIN C 500 mg tablet Generic drug:  ascorbic acid (vitamin C) Take 250 mg by mouth daily. * Notice: This list has 2 medication(s) that are the same as other medications prescribed for you. Read the directions carefully, and ask your doctor or other care provider to review them with you. Prescriptions Sent to Pharmacy Refills  
 olmesartan-hydroCHLOROthiazide (BENICAR HCT) 40-25 mg per tablet 6 Sig: Take 1 Tab by mouth daily. Class: Normal  
 Pharmacy: [facility] #: [phone] Route: Oral  
  
Follow-up Instructions Return in about 2 weeks (around 8/28/2018) for recheck blood pressure. Patient Instructions Instruction on how to use steam to improve congestion Put two tbs of baking soda in a pot (quart) of water and heat to steam.  Take off fire and place face over steam with towel over your head and pot. Allow congestion to come out of nasal passages and then come out of towel to blow your nose. Return into the steam tent. It also will help more effectively to put a few drops of peppermint oil or eucalyptus in the mixture. They key is to allow everything stuck in your sinuses and nose to come out so it is not a medium for infection. Ok to take allegra 180 mg for bad seasons. Olmesartan/Hydrochlorothiazide (By mouth) Hydrochlorothiazide (aodlfo-droe-klor-zd-IGDO-g-zide), Olmesartan Medoxomil (cc-ka-GUX-tan zh-BXX-yq-mil) Treats high blood pressure. This medicine contains an angiotensin receptor blocker (ARB) and a diuretic (water pill). Brand Name(s): Benicar HCT There may be other brand names for this medicine. When This Medicine Should Not Be Used: This medicine is not right for everyone. Do not use it if you had an allergic reaction to olmesartan or hydrochlorothiazide, or if you are pregnant. How to Use This Medicine:  
Tablet · Take your medicine as directed. Your dose may need to be changed several times to find what works best for you. · Missed dose: Take a dose as soon as you remember. If it is almost time for your next dose, wait until then and take a regular dose. Do not take extra medicine to make up for a missed dose. · Store the medicine in a closed container at room temperature, away from heat, moisture, and direct light. Drugs and Foods to Avoid: Ask your doctor or pharmacist before using any other medicine, including over-the-counter medicines, vitamins, and herbal products. · Do not use this medicine together with aliskiren. · Ask your doctor before you use any medicine, supplement, or salt substitute that contains potassium. · Some medicines can affect how this medicine works. Tell your doctor if you are using any of the following: ¨ Cholestyramine, colestipol, lithium ¨ ACE inhibitor blood pressure medicine ¨ Diuretic (water pill) ¨ Insulin or diabetes medicine ¨ NSAID pain or arthritis medicine (including aspirin, celecoxib, diclofenac, ibuprofen, naproxen) ¨ Steroid medicine (including dexamethasone, hydrocortisone, methylprednisolone, prednisolone, prednisone) · If you also use colesevelam, take it at least 4 hours after you take this medicine. · Alcohol, narcotic pain relievers, or sleeping pills may cause you to feel more lightheaded, dizzy, or faint when used with this medicine. Warnings While Using This Medicine: · It is not safe to take this medicine during pregnancy. It could harm an unborn baby. Tell your doctor right away if you become pregnant. · Tell your doctor if you are breastfeeding, or if you have kidney problems or trouble urinating, liver disease, heart failure, diabetes, glaucoma, gout, high cholesterol, lupus, or a history of asthma or allergies, including a sulfa or penicillin allergy. · This medicine may cause the following problems: ¨ Vision changes ¨ Kidney problems ¨ Low or high levels of minerals in your blood (including potassium and sodium) · This medicine could lower your blood pressure too much, especially when you first use it or if you are dehydrated. Stand or sit up slowly if you feel dizzy or lightheaded. · Drink plenty of fluids if you exercise, sweat more than usual, or have diarrhea or vomiting while you are using this medicine. · Your doctor will do lab tests at regular visits to check on the effects of this medicine. Keep all appointments. · Keep all medicine out of the reach of children. Never share your medicine with anyone. Possible Side Effects While Using This Medicine:  
Call your doctor right away if you notice any of these side effects: · Allergic reaction: Itching or hives, swelling in your face or hands, swelling or tingling in your mouth or throat, chest tightness, trouble breathing · Blistering, peeling, or red skin rash · Change in how much or how often you urinate, bloody or cloudy urine · Confusion, weakness, muscle twitching · Dry mouth, increased thirst, muscle cramps, nausea, vomiting · Eye pain, vision changes, seeing halos around lights · Fast, slow, or uneven heartbeat, chest pain · Lightheadedness, dizziness, or fainting · Severe diarrhea with weight loss (could occur months to years after you start taking this medicine) If you notice these less serious side effects, talk with your doctor: · Cough, runny or stuffy nose, sore throat If you notice other side effects that you think are caused by this medicine, tell your doctor. Call your doctor for medical advice about side effects. You may report side effects to FDA at 6-719-FDA-1885 © 2017 [facility] Information is for End User's use only and may not be sold, redistributed or otherwise used for commercial purposes. The above information is an  only. It is not intended as medical advice for individual conditions or treatments. Talk to your doctor, nurse or pharmacist before following any medical regimen to see if it is safe and effective for you. Introducing [facility]! Dear Najma Troy: Thank you for requesting a [employer] account. Our records indicate that you already have an active [employer] account. You can access your account anytime at [email or web address] Did you know that you can access your hospital and ER discharge instructions at any time in [employer]? You can also review all of your test results from your hospital stay or ER visit. Additional Information If you have questions, please visit the Frequently Asked Questions section of the [employer] website at [email or web address] Remember, MyChart is NOT to be used for urgent needs. For medical emergencies, dial 911. Now available from your iPhone and Android! Please provide this summary of care documentation to your next provider. Your primary care clinician is listed as Rneny Real. If you have any questions after today's visit, please call [phone].

## 2018-08-30 ENCOUNTER — OFFICE VISIT (OUTPATIENT)
Dept: INTERNAL MEDICINE CLINIC | Age: 73
End: 2018-08-30

## 2018-08-30 VITALS
RESPIRATION RATE: 18 BRPM | BODY MASS INDEX: 27.52 KG/M2 | SYSTOLIC BLOOD PRESSURE: 124 MMHG | DIASTOLIC BLOOD PRESSURE: 86 MMHG | OXYGEN SATURATION: 96 % | HEART RATE: 64 BPM | HEIGHT: 64 IN | TEMPERATURE: 96.7 F | WEIGHT: 161.2 LBS

## 2018-08-30 DIAGNOSIS — I10 ESSENTIAL HYPERTENSION: Primary | ICD-10-CM

## 2018-08-30 NOTE — PROGRESS NOTES
Hypertension (2 week f/u )       HPI:  Orlando Bowen is a 81y.o. year old female who is here for a follow up visit. She was last seen by me on 8/14/2018. She reports the following: We switched from losartan 100 mg to benicar hct 40-25. Her home readings are good and she brought in machine. She did have SBP of 98. Some positional dizziness. Assessment and Plan        1. Essential hypertension  Appears in control now. She had some dizziness issues in the beginning of taking medicine but now doing better. Monitor at home. Home machine is reading a little higher than manual today by me. Visit Vitals    /90 (BP 1 Location: Left arm, BP Patient Position: Sitting)    Pulse 64    Temp 96.7 °F (35.9 °C) (Oral)    Resp 18    Ht 5' 4\" (1.626 m)    Wt 161 lb 3.2 oz (73.1 kg)    SpO2 96%    BMI 27.67 kg/m2       Historical Data    Past Medical History:   Diagnosis Date    Adopted     Adverse effect of anesthesia     tremors and shaking after anesthesia/\"taks silly\"    BRCA negative 5/2013    Dr. Morteza Marquez Cervical arthritis [facility])     History of ovarian cancer 2013    surgery/chemotherapy    Hypercholesterolemia     Hypertension     Ill-defined condition     hx passing out from dehydration    Other anxiety states     Other ill-defined conditions(799.89) 2010    BOWEL OBSTRUCTION  ? IMPACTION    Unspecified adverse effect of anesthesia     TREMORS AFTERWARDS       Past Surgical History:   Procedure Laterality Date    COLONOSCOPY N/A 8/2/2017    COLONOSCOPY performed by Riana Ben[facility]z MD at [facility] ENDOSCOPY    HX CHOLECYSTECTOMY  2000    HX DILATION AND CURETTAGE      HX GYN  1/11/13    RSO    HX GYN  2/13/13    TLH/LSO, omentectomy, lymph node dissection    HX TONSILLECTOMY      AS CHILD    HX TUBAL LIGATION  1978    HX VASCULAR ACCESS      placed and removed    HX WISDOM TEETH EXTRACTION         Outpatient Encounter Prescriptions as of 8/30/2018   Medication Sig Dispense Refill    olmesartan-hydroCHLOROthiazide (BENICAR HCT) 40-25 mg per tablet Take 1 Tab by mouth daily. 30 Tab 6    escitalopram oxalate (LEXAPRO) 10 mg tablet TAKE 1 TABLET EVERY DAY 90 Tab 3    pravastatin (PRAVACHOL) 20 mg tablet TAKE ONE TABLET BY M0UTH EVERY NIGHT 90 Tab 3    FOLIC ACID/MULTIVIT-MIN/LUTEIN (CENTRUM SILVER PO) Take  by mouth. Takes one po once daily.  coenzyme q10 (CO Q-10) 300 mg cap Take 300 mg by mouth daily.  DOCOSAHEXANOIC ACID/EPA (FISH OIL PO) Take 1,200 mg by mouth daily.  estradiol (ESTRACE) 1 mg tablet TAKE ONE TABLET BY MOUTH DAILY 30 Tab 12    estradiol (ESTRACE) 0.01 % (0.1 mg/gram) vaginal cream Insert 1 g into vagina two (2) times a week. SUN and WED      ascorbic acid (VITAMIN C) 500 mg tablet Take 250 mg by mouth daily. No facility-administered encounter medications on file as of 8/30/2018. Allergies   Allergen Reactions    Simvastatin Myalgia    Codeine Other (comments)     Unable to function    Lidocaine Hives        Social History     Social History    Marital status:      Spouse name: N/A    Number of children: N/A    Years of education: N/A     Occupational History    Banking Retired     Social History Main Topics    Smoking status: Never Smoker    Smokeless tobacco: Never Used    Alcohol use 6.0 oz/week     10 Glasses of wine per week      Comment: 1-2 glasses nightly    Drug use: No    Sexual activity: No     Other Topics Concern    Exercise Yes     walking 1/2 - 1 mile daily     Social History Narrative    . Adopted by great aunt and uncle. Retired from [employer] industry. Two adult sons, one currently at home with her with his wife and grandchild. family history includes Dementia in her mother; Diabetes in an other family member; Drug Abuse in her son;  Heart Attack in an other family member; Heart Disease in an other family member; Hypertension in her father and mother; Parkinsonism in her mother; Stroke in her father. Review of Systems   Constitutional: Negative for weight loss. Eyes: Negative for blurred vision. Respiratory: Negative for shortness of breath. Cardiovascular: Negative for chest pain. Gastrointestinal: Negative for abdominal pain. Genitourinary: Negative for dysuria and frequency. Skin: Negative for rash. Neurological: Negative for dizziness, focal weakness, weakness and headaches. Endo/Heme/Allergies: Negative for environmental allergies. Does not bruise/bleed easily. Physical Exam   Constitutional: She appears well-developed and well-nourished. She is active. Non-toxic appearance. She does not have a sickly appearance. She does not appear ill. No distress. Eyes: Conjunctivae are normal. Right eye exhibits no discharge. Cardiovascular: Normal rate, regular rhythm, S1 normal, S2 normal, normal heart sounds and normal pulses. Exam reveals no gallop and no friction rub. Pulmonary/Chest: Effort normal and breath sounds normal. No respiratory distress. Abdominal: Soft. Bowel sounds are normal.   Musculoskeletal: She exhibits no edema or deformity. Neurological: She is alert. Skin: Skin is warm and dry. No rash noted. No pallor. Psychiatric: She has a normal mood and affect. Her behavior is normal.   Vitals reviewed. Ortho Exam      No orders of the defined types were placed in this encounter. I have reviewed the patient's medical history in detail and updated the computerized patient record. We had a prolonged discussion about these complex clinical issues and went over the various important aspects to consider. All questions were answered. Advised her to call back or return to office if symptoms do not improve, change in nature, or persist.    She was given an after visit summary or informed of [facility] Access which includes patient instructions, diagnoses, current medications, & vitals.   She expressed understanding with the diagnosis and plan.

## 2018-08-30 NOTE — PROGRESS NOTES
Reviewed record in preparation for visit and have obtained necessary documentation. Identified pt with two pt identifiers(name and ).       Health Maintenance Due   Topic    GLAUCOMA SCREENING Q2Y     Influenza Age 5 to Adult          Chief Complaint   Patient presents with    Hypertension     2 week f/u         Wt Readings from Last 3 Encounters:   18 161 lb 3.2 oz (73.1 kg)   18 162 lb 11.2 oz (73.8 kg)   18 162 lb 12.8 oz (73.8 kg)     Temp Readings from Last 3 Encounters:   18 97.9 °F (36.6 °C) (Oral)   18 98.3 °F (36.8 °C) (Oral)   17 97.4 °F (36.3 °C) (Oral)     BP Readings from Last 3 Encounters:   18 (!) 180/96   18 154/90   18 120/80     Pulse Readings from Last 3 Encounters:   18 67   18 64   18 72           Learning Assessment:  :     Learning Assessment 2018 2018 1/10/2017 2015 2015 2014   PRIMARY LEARNER Patient Patient Patient Patient Patient Patient   HIGHEST LEVEL OF EDUCATION - PRIMARY LEARNER  [address] PRIMARY LEARNER - NONE NONE NONE NONE NONE   CO-LEARNER CAREGIVER - No No Yes No No   CO-LEARNER NAME - - - Toy - -   CO-LEARNER HIGHEST LEVEL OF EDUCATION - - - > 4 YEARS OF COLLEGE - -   [address], [address] ENGLISH ENGLISH ENGLISH ENGLISH   PRIMARY LANGUAGE CO-LEARNER - - - ENGLISH - -    NEED - - - No No -   LEARNER PREFERENCE PRIMARY DEMONSTRATION LISTENING LISTENING DEMONSTRATION DEMONSTRATION LISTENING     - - - LISTENING - -   LEARNING SPECIAL TOPICS - - - - no -   ANSWERED BY patient patient pt pt patient -   RELATIONSHIP SELF SELF SELF SELF SELF SELF   ASSESSMENT COMMENT - - - - none -       Depression Screening:  :     PHQ over the last two weeks 2018   Little interest or pleasure in doing things Not at all   Feeling down, depressed, irritable, or hopeless Not at all   Total Score PHQ 2 0       Fall Risk Assessment:  :     Fall Risk Assessment, last 12 mths 8/30/2018   Able to walk? Yes   Fall in past 12 months? No       Abuse Screening:  :     Abuse Screening Questionnaire 8/14/2018 1/22/2015   Do you ever feel afraid of your partner? N (No Data)   Are you in a relationship with someone who physically or mentally threatens you? N (No Data)   Is it safe for you to go home? Y (No Data)       Coordination of Care Questionnaire:  :     1) Have you been to an emergency room, urgent care clinic since your last visit? no   Hospitalized since your last visit? no             2) Have you seen or consulted any other health care providers outside of [facility] since your last visit? no  (Include any pap smears or colon screenings in this section.)    3) Do you have an Advance Directive on file? no    4) Are you interested in receiving information on Advance Directives? NO      Patient is accompanied by  I have received verbal consent from [facility]\""er to discuss any/all medical information while they are present in the room.       Visit Vitals    BP (!) 139/100 (BP 1 Location: Left arm, BP Patient Position: Sitting)    Pulse 64    Temp 96.7 °F (35.9 °C) (Oral)    Resp 18    Ht 5' 4\" (1.626 m)    Wt 161 lb 3.2 oz (73.1 kg)    SpO2 96%    BMI 27.67 kg/m2     Visit Vitals    /90 (BP 1 Location: Left arm, BP Patient Position: Sitting)    Pulse 64    Temp 96.7 °F (35.9 °C) (Oral)    Resp 18    Ht 5' 4\" (1.626 m)    Wt 161 lb 3.2 oz (73.1 kg)    SpO2 96%    BMI 27.67 kg/m2

## 2018-09-06 ENCOUNTER — OFFICE VISIT (OUTPATIENT)
Dept: INTERNAL MEDICINE CLINIC | Age: 73
End: 2018-09-06

## 2018-09-06 VITALS
SYSTOLIC BLOOD PRESSURE: 150 MMHG | TEMPERATURE: 98 F | HEART RATE: 57 BPM | DIASTOLIC BLOOD PRESSURE: 80 MMHG | OXYGEN SATURATION: 96 % | RESPIRATION RATE: 16 BRPM | WEIGHT: 161 LBS | BODY MASS INDEX: 27.49 KG/M2 | HEIGHT: 64 IN

## 2018-09-06 DIAGNOSIS — M79.644 THUMB PAIN, RIGHT: Primary | ICD-10-CM

## 2018-09-06 DIAGNOSIS — I10 ESSENTIAL HYPERTENSION: ICD-10-CM

## 2018-09-06 RX ORDER — DICLOFENAC SODIUM 10 MG/G
2 GEL TOPICAL
Qty: 100 G | Refills: 1 | Status: SHIPPED | OUTPATIENT
Start: 2018-09-06 | End: 2018-10-02 | Stop reason: SDUPTHER

## 2018-09-06 RX ORDER — OLMESARTAN MEDOXOMIL 40 MG/1
40 TABLET ORAL DAILY
Qty: 30 TAB | Refills: 6 | Status: SHIPPED | OUTPATIENT
Start: 2018-09-06 | End: 2019-02-15 | Stop reason: SDUPTHER

## 2018-09-06 RX ORDER — HYDROCHLOROTHIAZIDE 25 MG/1
25 TABLET ORAL DAILY
Qty: 90 TAB | Refills: 1 | Status: SHIPPED | OUTPATIENT
Start: 2018-09-06 | End: 2018-09-13

## 2018-09-06 NOTE — MR AVS SNAPSHOT
[address] [facility], [address] [place] 
[phone] Patient: Peggie Fleischer MRN: [id number] Visit Information Date & Time Provider Department Dept. Phone Encounter #  
 2018 10:45 AM MD Jagdeep Morales [facility] [id number] Your Appointments 2/15/2019 11:45 AM  
ROUTINE CARE with MD Jagdeep Mor[facility]) Appt Note: F/u  
 [address] , [address] [place]  
[address], [facility] [address] [address] [id number] Upcoming Health Maintenance Date Due  
 GLAUCOMA SCREENING Q2Y 2017 Influenza Age 5 to Adult 2018 BREAST CANCER SCRN MAMMOGRAM 10/31/2019 DTaP/Tdap/Td series (2 - Td) 2024 COLONOSCOPY 2027 Allergies as of 2018  Review Complete On: 2018 By: Kaushik Menon LPN Severity Noted Reaction Type Reactions Simvastatin Medium 2017   Side Effect Myalgia Codeine  2011   Systemic Other (comments) Unable to function Lidocaine  2011   Systemic Hives Current Immunizations  Reviewed on 2017 Name Date Influenza High Dose Vaccine PF 2017 Influenza Vaccine 10/8/2016, 2012 Pneumococcal Conjugate (PCV-13) 2017 Pneumococcal Polysaccharide (PPSV-23) 2013 Tdap 2014 Zoster Vaccine, Live 2017 Not reviewed this visit You Were Diagnosed With   
  
 Codes Comments Thumb pain, right    -  Primary ICD-10-CM: C13.455 ICD-9-CM: 729.5 Essential hypertension     ICD-10-CM: I10 
ICD-9-CM: 401.9 Vitals BP Pulse Temp Resp Height(growth percentile) Weight(growth percentile) 130/70 (BP 1 Location: Left arm) (!) 57 98 °F (36.7 °C) (Oral) 16 5' 4\" (1.626 m) 161 lb (73 kg) SpO2 BMI OB Status Smoking Status 96% 27.64 kg/m2 Hysterectomy Never Smoker BMI and BSA Data Body Mass Index Body Surface Area  
 27.64 kg/m 2 1.82 m 2 Preferred Pharmacy Pharmacy Name Phone [facility] #[id number] [phone] Your Updated Medication List  
  
   
This list is accurate as of 9/6/18 11:47 AM.  Always use your most recent med list.  
  
  
  
  
 CENTRUM SILVER PO Take  by mouth. Takes one po once daily. CO Q-10 300 mg Cap Generic drug:  coenzyme q10 Take 300 mg by mouth daily. diclofenac 1 % Gel Commonly known as:  VOLTAREN Apply 2 g to affected area three (3) times daily as needed. escitalopram oxalate 10 mg tablet Commonly known as:  Willa Levo TAKE 1 TABLET EVERY DAY  
  
 * ESTRACE 0.01 % (0.1 mg/gram) vaginal cream  
Generic drug:  estradiol Insert 1 g into vagina two (2) times a week. SUN and WED * estradiol 1 mg tablet Commonly known as:  ESTRACE  
TAKE ONE TABLET BY MOUTH DAILY FISH OIL PO Take 1,200 mg by mouth daily. hydroCHLOROthiazide 25 mg tablet Commonly known as:  HYDRODIURIL Take 1 Tab by mouth daily. olmesartan 40 mg tablet Commonly known as:  [employer] Take 1 Tab by mouth daily. pravastatin 20 mg tablet Commonly known as:  PRAVACHOL  
TAKE ONE TABLET BY M0UTH EVERY NIGHT  
  
 VITAMIN C 500 mg tablet Generic drug:  ascorbic acid (vitamin C) Take 250 mg by mouth daily. * Notice: This list has 2 medication(s) that are the same as other medications prescribed for you. Read the directions carefully, and ask your doctor or other care provider to review them with you. Prescriptions Sent to Pharmacy Refills  
 diclofenac (VOLTAREN) 1 % gel 1 Sig: Apply 2 g to affected area three (3) times daily as needed.   
 Class: Normal  
 Pharmacy: [facility]/pharmacy #[id number] - [facility] [address] Ph #: [phone] Route: Topical  
 olmesartan (BENICAR) 40 mg tablet 6 Sig: Take 1 Tab by mouth daily. Class: Normal  
 Pharmacy: [facility] Ph #: [phone] Route: Oral  
 hydroCHLOROthiazide (HYDRODIURIL) 25 mg tablet 1 Sig: Take 1 Tab by mouth daily. Class: Normal  
 Pharmacy: [facility] Ph #: [phone] Route: Oral  
  
Patient Instructions Thumb spica splint Introducing [facility] SERVICES! Dear Jumana Livingston: Thank you for requesting a [employer] account. Our records indicate that you already have an active [employer] account. You can access your account anytime at [email or web address] Did you know that you can access your hospital and ER discharge instructions at any time in [employer]? You can also review all of your test results from your hospital stay or ER visit. Additional Information If you have questions, please visit the Frequently Asked Questions section of the [employer] website at [email or web address] Remember, [employer] is NOT to be used for urgent needs. For medical emergencies, dial 911. Now available from your iPhone and Android! Please provide this summary of care documentation to your next provider. Your primary care clinician is listed as Sebastian Kilgoree. If you have any questions after today's visit, please call [phone].

## 2018-09-06 NOTE — PROGRESS NOTES
Medication Evaluation and Hand Pain (right thumb)       HPI:  Romina Blood is a 81y.o. year old female who is here for a follow up visit. She was last seen by me on 8/30/2018. She reports the following:    Right thumb- ironing. Can't take NSAIDS. Tylenol doesn't help. Swollen last night  Swelling went down. /68. Some dizziness. Assessment and Plan        1. Thumb pain, right  Try gel on hand. Use thumb spica splint. Mild swelling on back of 1st MCP  - diclofenac (VOLTAREN) 1 % gel; Apply 2 g to affected area three (3) times daily as needed. Dispense: 100 g; Refill: 1    2. Essential hypertension  Try taking becar 40 mg in am  Only as we may have brought it down too fast.  If BP goes back up, add hctz back at afternoon, but take mental note if feeling less dizzy. If hctz cause of her feeling bad, we can add amlodipine instead. - olmesartan (BENICAR) 40 mg tablet; Take 1 Tab by mouth daily. Dispense: 30 Tab; Refill: 6  - hydroCHLOROthiazide (HYDRODIURIL) 25 mg tablet; Take 1 Tab by mouth daily. Dispense: 90 Tab; Refill: 1          Visit Vitals    /80    Pulse (!) 57    Temp 98 °F (36.7 °C) (Oral)    Resp 16    Ht 5' 4\" (1.626 m)    Wt 161 lb (73 kg)    SpO2 96%    BMI 27.64 kg/m2       Historical Data    Past Medical History:   Diagnosis Date    Adopted     Adverse effect of anesthesia     tremors and shaking after anesthesia/\"taks silly\"    BRCA negative 5/2013    Dr. Anabela Pasrons Cervical arthritis [facility])     History of ovarian cancer 2013    surgery/chemotherapy    Hypercholesterolemia     Hypertension     Ill-defined condition     hx passing out from dehydration    Other anxiety states     Other ill-defined conditions(799.89) 2010    BOWEL OBSTRUCTION  ? IMPACTION    Unspecified adverse effect of anesthesia     TREMORS AFTERWARDS       Past Surgical History:   Procedure Laterality Date    COLONOSCOPY N/A 8/2/2017    [facility]el[facility] ENDOSCOPY    HX CHOLECYSTECTOMY  2000    HX DILATION AND CURETTAGE      HX GYN  1/11/13    RSO    HX GYN  2/13/13    TLH/LSO, omentectomy, lymph node dissection    HX TONSILLECTOMY      AS CHILD    HX TUBAL LIGATION  1978    HX VASCULAR ACCESS      placed and removed    HX WISDOM TEETH EXTRACTION         Outpatient Encounter Prescriptions as of 9/6/2018   Medication Sig Dispense Refill    diclofenac (VOLTAREN) 1 % gel Apply 2 g to affected area three (3) times daily as needed. 100 g 1    olmesartan (BENICAR) 40 mg tablet Take 1 Tab by mouth daily. 30 Tab 6    hydroCHLOROthiazide (HYDRODIURIL) 25 mg tablet Take 1 Tab by mouth daily. 90 Tab 1    escitalopram oxalate (LEXAPRO) 10 mg tablet TAKE 1 TABLET EVERY DAY 90 Tab 3    pravastatin (PRAVACHOL) 20 mg tablet TAKE ONE TABLET BY M0UTH EVERY NIGHT 90 Tab 3    FOLIC ACID/MULTIVIT-MIN/LUTEIN (CENTRUM SILVER PO) Take  by mouth. Takes one po once daily.  coenzyme q10 (CO Q-10) 300 mg cap Take 300 mg by mouth daily.  DOCOSAHEXANOIC ACID/EPA (FISH OIL PO) Take 1,200 mg by mouth daily.  estradiol (ESTRACE) 1 mg tablet TAKE ONE TABLET BY MOUTH DAILY 30 Tab 12    estradiol (ESTRACE) 0.01 % (0.1 mg/gram) vaginal cream Insert 1 g into vagina two (2) times a week. SUN and WED      ascorbic acid (VITAMIN C) 500 mg tablet Take 250 mg by mouth daily.  [DISCONTINUED] olmesartan-hydroCHLOROthiazide (BENICAR HCT) 40-25 mg per tablet Take 1 Tab by mouth daily. 30 Tab 6     No facility-administered encounter medications on file as of 9/6/2018.          Allergies   Allergen Reactions    Simvastatin Myalgia    Codeine Other (comments)     Unable to function    Lidocaine Hives        Social History     Social History    Marital status:      Spouse name: N/A    Number of children: N/A    Years of education: N/A     Occupational History    Banking Retired     Social History Main Topics    Smoking status: Never Smoker    Smokeless tobacco: Never Used    Alcohol use 6.0 oz/week     10 Glasses of wine per week      Comment: 1-2 glasses nightly    Drug use: No    Sexual activity: No     Other Topics Concern    Exercise Yes     walking 1/2 - 1 mile daily     Social History Narrative    . Adopted by great aunt and uncle. Retired from [employer]. Two adult sons, one currently at home with her with his wife and grandchild. family history includes Dementia in her mother; Diabetes in an other family member; Drug Abuse in her son; Heart Attack in an other family member; Heart Disease in an other family member; Hypertension in her father and mother; Parkinsonism in her mother; Stroke in her father. Review of Systems   Constitutional: Negative for weight loss. Eyes: Negative for blurred vision. Respiratory: Negative for shortness of breath. Cardiovascular: Negative for chest pain. Gastrointestinal: Negative for abdominal pain. Genitourinary: Negative for dysuria and frequency. Skin: Negative for rash. Neurological: Negative for dizziness, focal weakness, weakness and headaches. Endo/Heme/Allergies: Negative for environmental allergies. Does not bruise/bleed easily. Physical Exam   Constitutional: She appears well-developed and well-nourished. She is active. Non-toxic appearance. She does not have a sickly appearance. She does not appear ill. No distress. Eyes: Conjunctivae are normal. Right eye exhibits no discharge. Cardiovascular: Normal rate, regular rhythm, S1 normal, S2 normal, normal heart sounds and normal pulses. Exam reveals no gallop and no friction rub. Pulmonary/Chest: Effort normal and breath sounds normal. No respiratory distress. Abdominal: Soft. Bowel sounds are normal.   Musculoskeletal: She exhibits no edema or deformity. Neurological: She is alert. Skin: Skin is warm and dry. No rash noted. No pallor. Psychiatric: She has a normal mood and affect.  Her behavior is normal.   Vitals reviewed. Ortho Exam      Orders Placed This Encounter    diclofenac (VOLTAREN) 1 % gel     Sig: Apply 2 g to affected area three (3) times daily as needed. Dispense:  100 g     Refill:  1    olmesartan (BENICAR) 40 mg tablet     Sig: Take 1 Tab by mouth daily. Dispense:  30 Tab     Refill:  6    hydroCHLOROthiazide (HYDRODIURIL) 25 mg tablet     Sig: Take 1 Tab by mouth daily. Dispense:  90 Tab     Refill:  1        I have reviewed the patient's medical history in detail and updated the computerized patient record. We had a prolonged discussion about these complex clinical issues and went over the various important aspects to consider. All questions were answered. Advised her to call back or return to office if symptoms do not improve, change in nature, or persist.    She was given an after visit summary or informed of [employer] Access which includes patient instructions, diagnoses, current medications, & vitals. She expressed understanding with the diagnosis and plan.

## 2018-09-06 NOTE — PROGRESS NOTES
Chief Complaint   Patient presents with    Medication Evaluation     Reviewed record in preparation for visit and have obtained necessary documentation. Identified pt with two pt identifiers(name and ).       Health Maintenance Due   Topic    GLAUCOMA SCREENING Q2Y     Influenza Age 5 to Adult          Chief Complaint   Patient presents with    Medication Evaluation    Hand Pain     right thumb        Wt Readings from Last 3 Encounters:   18 161 lb (73 kg)   18 161 lb 3.2 oz (73.1 kg)   18 162 lb 11.2 oz (73.8 kg)     Temp Readings from Last 3 Encounters:   18 98 °F (36.7 °C) (Oral)   18 96.7 °F (35.9 °C) (Oral)   18 97.9 °F (36.6 °C) (Oral)     BP Readings from Last 3 Encounters:   18 130/70   18 124/86   18 (!) 180/96     Pulse Readings from Last 3 Encounters:   18 (!) 57   18 64   18 67           Learning Assessment:  :     Learning Assessment 2018 2018 1/10/2017 2015 2015 2014   PRIMARY LEARNER Patient Patient Patient Patient Patient Patient   HIGHEST LEVEL OF EDUCATION - PRIMARY LEARNER  [address] [facility] SOME COLLEGE   BARRIERS PRIMARY LEARNER - NONE NONE NONE NONE NONE   CO-LEARNER CAREGIVER - No No Yes No No   CO-LEARNER NAME - - - Toy - -   CO-LEARNER HIGHEST LEVEL OF EDUCATION - - - > 4 YEARS OF COLLEGE - -   [address], [address] ENGLISH ENGLISH ENGLISH ENGLISH   PRIMARY LANGUAGE CO-LEARNER - - - ENGLISH - -    NEED - - - No No -   LEARNER PREFERENCE PRIMARY DEMONSTRATION LISTENING LISTENING DEMONSTRATION DEMONSTRATION LISTENING     - - - LISTENING - -   LEARNING SPECIAL TOPICS - - - - no -   ANSWERED BY patient patient pt pt patient -   RELATIONSHIP SELF SELF SELF SELF SELF SELF   ASSESSMENT COMMENT - - - - none -       Depression Screening:  :     PHQ over the last two weeks 2018 Little interest or pleasure in doing things Not at all   Feeling down, depressed, irritable, or hopeless -   Total Score PHQ 2 -       Fall Risk Assessment:  :     Fall Risk Assessment, last 12 mths 8/30/2018   Able to walk? Yes   Fall in past 12 months? No       Abuse Screening:  :     Abuse Screening Questionnaire 8/14/2018 1/22/2015   Do you ever feel afraid of your partner? N (No Data)   Are you in a relationship with someone who physically or mentally threatens you? N (No Data)   Is it safe for you to go home? Y (No Data)       Coordination of Care Questionnaire:  :     1) Have you been to an emergency room, urgent care clinic since your last visit? no   Hospitalized since your last visit? no             2) Have you seen or consulted any other health care providers outside of [address] [place] [address] since your last visit? no  (Include any pap smears or colon screenings in this section.)    3) Do you have an Advance Directive on file? no    4) Are you interested in receiving information on Advance Directives? NO      Patient is accompanied by spouse I have received verbal consent from Edson Martínez to discuss any/all medical information while they are present in the room. Reviewed record  In preparation for visit and have obtained necessary documentation.

## 2018-09-13 ENCOUNTER — OFFICE VISIT (OUTPATIENT)
Dept: INTERNAL MEDICINE CLINIC | Age: 73
End: 2018-09-13

## 2018-09-13 VITALS
SYSTOLIC BLOOD PRESSURE: 142 MMHG | RESPIRATION RATE: 18 BRPM | TEMPERATURE: 99.6 F | DIASTOLIC BLOOD PRESSURE: 80 MMHG | HEIGHT: 64 IN | WEIGHT: 162.5 LBS | BODY MASS INDEX: 27.74 KG/M2 | HEART RATE: 69 BPM | OXYGEN SATURATION: 97 %

## 2018-09-13 DIAGNOSIS — M79.10 MYALGIA: ICD-10-CM

## 2018-09-13 DIAGNOSIS — I10 ESSENTIAL HYPERTENSION: Primary | ICD-10-CM

## 2018-09-13 RX ORDER — CYCLOBENZAPRINE HCL 10 MG
10 TABLET ORAL
Qty: 20 TAB | Refills: 0 | Status: SHIPPED | OUTPATIENT
Start: 2018-09-13 | End: 2019-01-12 | Stop reason: SDUPTHER

## 2018-09-13 NOTE — PATIENT INSTRUCTIONS
Cyclobenzaprine (By mouth)   Cyclobenzaprine (tdo-fnxi-WXX-za-preen)  Treats pain and stiffness caused by muscle spasms. Brand Name(s): Amrix, Cyclo/Cesar 10/300 Pack, CycloTENS Refill Joey, CycloTENS Starter Joey, Cyclobenzaprine Comfort Pac, CyclobenzaprinePax, Fexmid, FlexePax, FusePaq Tabradol, RapidPaq Tabradol   There may be other brand names for this medicine. When This Medicine Should Not Be Used: This medicine is not right for everyone. Do not use it if you had an allergic reaction to cyclobenzaprine. How to Use This Medicine:   Long Acting Capsule, Liquid, Tablet  · Your doctor will tell you how much medicine to use. Do not use more than directed. · Take this medicine at the same time each day. · Swallow the extended-release capsule whole. Do not crush, break, or chew it. · If you cannot swallow the capsule whole, you may open the capsule and sprinkle the contents over one tablespoon of applesauce. Swallow the mixture right away without chewing. Rinse the mouth to make sure all of the medicine have been swallowed. Do not save any of the mixture to use later. · This medicine is not for long-term use. · Missed dose: Take a dose as soon as you remember. If it is almost time for your next dose, wait until then and take a regular dose. Do not take extra medicine to make up for a missed dose. · Store the medicine in a closed container at room temperature, away from heat, moisture, and direct light. Drugs and Foods to Avoid:   Ask your doctor or pharmacist before using any other medicine, including over-the-counter medicines, vitamins, and herbal products. · Do not use this medicine if you have used an MAO inhibitor (MAOI) within 14 days of each other. · Some foods and medicines can affect how this medicine works.  Tell your doctor if you are using any of the following:   ¨ Bupropion, guanethidine, meperidine, tramadol, verapamil  ¨ Medicine to treat depression (including amitriptyline, imipramine)  · Do not drink alcohol while you are using this medicine. · Tell your doctor if you use anything else that makes you sleepy. Some examples are allergy medicine, narcotic pain medicine, and alcohol. Warnings While Using This Medicine:   · Tell your doctor if you are pregnant or breastfeeding, or if you have liver problems, congestive heart failure, heart rhythm problems, a recent heart attack, overactive thyroid, or a history of glaucoma or trouble urinating. · This medicine may cause the following problems:  ¨ Serotonin syndrome, when taken with certain medicines  · This medicine may make you dizzy or drowsy. Do not drive or doing anything that could be dangerous until you know how this medicine affects you. · Call your doctor if your symptoms do not improve or if they get worse. · Keep all medicine out of the reach of children. Never share your medicine with anyone. Possible Side Effects While Using This Medicine:   Call your doctor right away if you notice any of these side effects:  · Allergic reaction: Itching or hives, swelling in your face or hands, swelling or tingling in your mouth or throat, chest tightness, trouble breathing  · Anxiety, restlessness, fever, sweating, twitching, nausea, vomiting, diarrhea, seeing or hearing things that are not there  · Fast, pounding, or uneven heartbeat  · Severe drowsiness, fainting, or confusion  If you notice these less serious side effects, talk with your doctor:   · Dizziness  · Dry mouth  If you notice other side effects that you think are caused by this medicine, tell your doctor. Call your doctor for medical advice about side effects. You may report side effects to FDA at 9-773-ZVV-0579  © 2017 [facility] Information is for End User's use only and may not be sold, redistributed or otherwise used for commercial purposes. The above information is an  only.  It is not intended as medical advice for individual conditions or treatments. Talk to your doctor, nurse or pharmacist before following any medical regimen to see if it is safe and effective for you.

## 2018-09-13 NOTE — PROGRESS NOTES
Reviewed record in preparation for visit and have obtained necessary documentation. Identified pt with two pt identifiers(name and ).       Health Maintenance Due   Topic    GLAUCOMA SCREENING Q2Y     Influenza Age 5 to Adult          Chief Complaint   Patient presents with    Hypertension     B/p med f/u        Wt Readings from Last 3 Encounters:   18 162 lb 8 oz (73.7 kg)   18 161 lb (73 kg)   18 161 lb 3.2 oz (73.1 kg)     Temp Readings from Last 3 Encounters:   18 98 °F (36.7 °C) (Oral)   18 96.7 °F (35.9 °C) (Oral)   18 97.9 °F (36.6 °C) (Oral)     BP Readings from Last 3 Encounters:   18 150/80   18 124/86   18 (!) 180/96     Pulse Readings from Last 3 Encounters:   18 (!) 57   18 64   18 67           Learning Assessment:  :     Learning Assessment 2018 2018 1/10/2017 2015 2015 2014   PRIMARY LEARNER Patient Patient Patient Patient Patient Patient   HIGHEST LEVEL OF EDUCATION - PRIMARY LEARNER  [address] [facility] PRIMARY LEARNER - NONE NONE NONE NONE NONE   CO-LEARNER CAREGIVER - No No Yes No No   CO-LEARNER NAME - - - Toy - -   CO-LEARNER HIGHEST LEVEL OF EDUCATION - - - > 4 YEARS [address] [facility] ENGLISH ENGLISH ENGLISH ENGLISH   PRIMARY LANGUAGE CO-LEARNER - - - ENGLISH - -    NEED - - - No No -   LEARNER PREFERENCE PRIMARY DEMONSTRATION LISTENING LISTENING DEMONSTRATION DEMONSTRATION LISTENING     - - - LISTENING - -   LEARNING SPECIAL TOPICS - - - - no -   ANSWERED BY patient patient pt pt patient -   RELATIONSHIP SELF SELF SELF SELF SELF SELF   ASSESSMENT COMMENT - - - - none -       Depression Screening:  :     PHQ over the last two weeks 2018   Little interest or pleasure in doing things Not at all   Feeling down, depressed, irritable, or hopeless Not at all   Total Score PHQ 2 0       Fall Risk Assessment:  :     Fall Risk Assessment, last 12 mths 9/13/2018   Able to walk? Yes   Fall in past 12 months? No       Abuse Screening:  :     Abuse Screening Questionnaire 8/14/2018 1/22/2015   Do you ever feel afraid of your partner? N (No Data)   Are you in a relationship with someone who physically or mentally threatens you? N (No Data)   Is it safe for you to go home? Y (No Data)       Coordination of Care Questionnaire:  :     1) Have you been to an emergency room, urgent care clinic since your last visit? no   Hospitalized since your last visit? no             2) Have you seen or consulted any other health care providers outside of [address] [place] [address] since your last visit? no  (Include any pap smears or colon screenings in this section.)    3) Do you have an Advance Directive on file? no    4) Are you interested in receiving information on Advance Directives? NO      Patient is accompanied by self I have received verbal consent from Caffie Arrant to discuss any/all medical information while they are present in the room.

## 2018-09-13 NOTE — PROGRESS NOTES
Primary Care Doctor is:  Isabel Sargent MD    Hypertension (B/p med f/u)       LAST VISIT: 9/6/2018    HPI:  Terrence Min is a 81y.o. year old female who is here for an acute care visit and has the following concerns:    Was supposed to be taking benicar in am and hctz in pm  But she stopped hctz at night because of joint pain  Joint pain improved coming off hctz. Leg swelling- after 5 hrs in the car. Pitting edema. Took the hctz then and then the swelling went away. [place] ok on Wednesday. 143/71 yesterday    141/71 this morning.- just taking benicar 40 mg    Feels \"terrible today\"  Joint pain all over, cramping, legs aching. No SOB no chest pain  Had a low grade fever. No urine symptoms. Assessment and Plan        1. Essential hypertension  Stay on benicar 40 mg for now. - CBC WITH AUTOMATED DIFF  - METABOLIC PANEL, COMPREHENSIVE    2. Myalgia  Unsure cause. ? Due to low potassium with hctz. Would not explain one episode of low grade fever. No joint swelling on exam.  - CK  - C REACTIVE PROTEIN, QT            Visit Vitals    /80 (BP 1 Location: Left arm, BP Patient Position: Sitting)    Pulse 69    Temp 99.6 °F (37.6 °C) (Oral)    Resp 18    Ht 5' 4\" (1.626 m)    Wt 162 lb 8 oz (73.7 kg)    SpO2 97%    BMI 27.89 kg/m2       Historical Data    Past Medical History:   Diagnosis Date    Adopted     Adverse effect of anesthesia     tremors and shaking after anesthesia/\"taks silly\"    BRCA negative 5/2013    Dr. Lissette Hernandez Cervical arthritis [facility])     History of ovarian cancer 2013    surgery/chemotherapy    Hypercholesterolemia     Hypertension     Ill-defined condition     hx passing out from dehydration    Other anxiety states     Other ill-defined conditions(799.89) 2010    BOWEL OBSTRUCTION  ? IMPACTION    Unspecified adverse effect of anesthesia     TREMORS AFTERWARDS       Past Surgical History:   Procedure Laterality Date    COLONOSCOPY N/A 8/2/2017    COLONOSCOPY performed by Leatha Rider [facility] at [facility] ENDOSCOPY    HX CHOLECYSTECTOMY  2000    HX DILATION AND CURETTAGE      HX GYN  1/11/13    RSO    HX GYN  2/13/13    TLH/LSO, omentectomy, lymph node dissection    HX TONSILLECTOMY      AS CHILD    HX TUBAL LIGATION  1978    HX VASCULAR ACCESS      placed and removed    HX WISDOM TEETH EXTRACTION         Outpatient Encounter Prescriptions as of 9/13/2018   Medication Sig Dispense Refill    cyclobenzaprine (FLEXERIL) 10 mg tablet Take 1 Tab by mouth nightly as needed for Muscle Spasm(s). 20 Tab 0    diclofenac (VOLTAREN) 1 % gel Apply 2 g to affected area three (3) times daily as needed. 100 g 1    olmesartan (BENICAR) 40 mg tablet Take 1 Tab by mouth daily. 30 Tab 6    escitalopram oxalate (LEXAPRO) 10 mg tablet TAKE 1 TABLET EVERY DAY 90 Tab 3    pravastatin (PRAVACHOL) 20 mg tablet TAKE ONE TABLET BY M0UTH EVERY NIGHT 90 Tab 3    FOLIC ACID/MULTIVIT-MIN/LUTEIN (CENTRUM SILVER PO) Take  by mouth. Takes one po once daily.  coenzyme q10 (CO Q-10) 300 mg cap Take 300 mg by mouth daily.  DOCOSAHEXANOIC ACID/EPA (FISH OIL PO) Take 1,200 mg by mouth daily.  estradiol (ESTRACE) 1 mg tablet TAKE ONE TABLET BY MOUTH DAILY 30 Tab 12    estradiol (ESTRACE) 0.01 % (0.1 mg/gram) vaginal cream Insert 1 g into vagina two (2) times a week. SUN and WED      ascorbic acid (VITAMIN C) 500 mg tablet Take 250 mg by mouth daily.  [DISCONTINUED] hydroCHLOROthiazide (HYDRODIURIL) 25 mg tablet Take 1 Tab by mouth daily. 90 Tab 1     No facility-administered encounter medications on file as of 9/13/2018.          Allergies   Allergen Reactions    Simvastatin Myalgia    Codeine Other (comments)     Unable to function    Lidocaine Hives        Social History     Social History    Marital status:      Spouse name: N/A    Number of children: N/A    Years of education: N/A     Occupational History    Banking Retired     Social History Main Topics    Smoking status: Never Smoker    Smokeless tobacco: Never Used    Alcohol use 6.0 oz/week     10 Glasses of wine per week      Comment: 1-2 glasses nightly    Drug use: No    Sexual activity: No     Other Topics Concern    Exercise Yes     walking 1/2 - 1 mile daily     Social History Narrative    . Adopted by great aunt and uncle. Retired from [employer]. Two adult sons, one currently at home with her with his wife and grandchild. family history includes Dementia in her mother; Diabetes in an other family member; Drug Abuse in her son; Heart Attack in an other family member; Heart Disease in an other family member; Hypertension in her father and mother; Parkinsonism in her mother; Stroke in her father. Review of Systems   Constitutional: Negative for weight loss. Eyes: Negative for blurred vision. Respiratory: Negative for shortness of breath. Cardiovascular: Negative for chest pain. Gastrointestinal: Negative for abdominal pain. Genitourinary: Negative for dysuria and frequency. Skin: Negative for rash. Neurological: Negative for dizziness, focal weakness, weakness and headaches. Endo/Heme/Allergies: Negative for environmental allergies. Does not bruise/bleed easily. Physical Exam   Constitutional: She is oriented to person, place, and time and well-developed, well-nourished, and in no distress. Vital signs are normal. She appears not dehydrated. She appears healthy. Non-toxic appearance. She does not have a sickly appearance. No distress. Eyes: Conjunctivae are normal.   Cardiovascular: Normal rate and regular rhythm. Pulmonary/Chest: Effort normal and breath sounds normal.   Abdominal: Soft. Bowel sounds are normal. She exhibits no distension. There is no tenderness. Musculoskeletal: She exhibits no edema or deformity. Neurological: She is alert and oriented to person, place, and time. Gait normal.   Skin: Skin is warm and dry.    Psychiatric: Mood and affect normal.     Ortho Exam           I have reviewed the patient's medical history in detail and updated the computerized patient record. We had a prolonged discussion about these complex clinical issues and went over the various important aspects to consider. All questions were answered. Advised her to call back or return to office if symptoms do not improve, change in nature, or persist.    She was given an after visit summary or informed of [employer] which includes patient instructions, diagnoses, current medications, & vitals. She expressed understanding with the diagnosis and plan.

## 2018-09-14 LAB
ALBUMIN SERPL-MCNC: 4.3 G/DL (ref 3.5–4.8)
ALBUMIN/GLOB SERPL: 1.5 {RATIO} (ref 1.2–2.2)
ALP SERPL-CCNC: 74 IU/L (ref 39–117)
ALT SERPL-CCNC: 16 IU/L (ref 0–32)
AST SERPL-CCNC: 23 IU/L (ref 0–40)
BASOPHILS # BLD AUTO: 0 X10E3/UL (ref 0–0.2)
BASOPHILS NFR BLD AUTO: 0 %
BILIRUB SERPL-MCNC: 0.4 MG/DL (ref 0–1.2)
BUN SERPL-MCNC: 10 MG/DL (ref 8–27)
BUN/CREAT SERPL: 16 (ref 12–28)
CALCIUM SERPL-MCNC: 9.6 MG/DL (ref 8.7–10.3)
CHLORIDE SERPL-SCNC: 93 MMOL/L (ref 96–106)
CK SERPL-CCNC: 59 U/L (ref 24–173)
CO2 SERPL-SCNC: 26 MMOL/L (ref 20–29)
CREAT SERPL-MCNC: 0.64 MG/DL (ref 0.57–1)
CRP SERPL-MCNC: 37.5 MG/L (ref 0–4.9)
EOSINOPHIL # BLD AUTO: 0.1 X10E3/UL (ref 0–0.4)
EOSINOPHIL NFR BLD AUTO: 1 %
ERYTHROCYTE [DISTWIDTH] IN BLOOD BY AUTOMATED COUNT: 13.7 % (ref 12.3–15.4)
GLOBULIN SER CALC-MCNC: 2.8 G/DL (ref 1.5–4.5)
GLUCOSE SERPL-MCNC: 88 MG/DL (ref 65–99)
HCT VFR BLD AUTO: 39.9 % (ref 34–46.6)
HGB BLD-MCNC: 13.8 G/DL (ref 11.1–15.9)
IMM GRANULOCYTES # BLD: 0 X10E3/UL (ref 0–0.1)
IMM GRANULOCYTES NFR BLD: 0 %
LYMPHOCYTES # BLD AUTO: 1.8 X10E3/UL (ref 0.7–3.1)
LYMPHOCYTES NFR BLD AUTO: 18 %
MCH RBC QN AUTO: 31.1 PG (ref 26.6–33)
MCHC RBC AUTO-ENTMCNC: 34.6 G/DL (ref 31.5–35.7)
MCV RBC AUTO: 90 FL (ref 79–97)
MONOCYTES # BLD AUTO: 1.3 X10E3/UL (ref 0.1–0.9)
MONOCYTES NFR BLD AUTO: 13 %
NEUTROPHILS # BLD AUTO: 6.8 X10E3/UL (ref 1.4–7)
NEUTROPHILS NFR BLD AUTO: 68 %
PLATELET # BLD AUTO: 318 X10E3/UL (ref 150–379)
POTASSIUM SERPL-SCNC: 4.4 MMOL/L (ref 3.5–5.2)
PROT SERPL-MCNC: 7.1 G/DL (ref 6–8.5)
RBC # BLD AUTO: 4.44 X10E6/UL (ref 3.77–5.28)
SODIUM SERPL-SCNC: 134 MMOL/L (ref 134–144)
WBC # BLD AUTO: 10.1 X10E3/UL (ref 3.4–10.8)

## 2018-09-14 NOTE — PROGRESS NOTES
Your blood tests were mostly normal but the marker for inflammation (CRP) is abnormal.  It would explain your joint pain and not feeling well. It may be viral in origin. I don't think this is medication related. The monocytes that were slightly elevated may be related to this virus. It also would explain your fever that one day. The good news is that it should go away and resting this weekend while drinking a lot of fluids will help a lot. Send me a message on Monday to let me know how you are doing.   Message sent to patient via [employer]:  September 14, 2018

## 2018-09-15 ENCOUNTER — HOSPITAL ENCOUNTER (EMERGENCY)
Age: 73
Discharge: HOME | End: 2018-09-15
Attending: EMERGENCY MEDICINE
Payer: COMMERCIAL

## 2018-09-15 ENCOUNTER — APPOINTMENT (OUTPATIENT)
Dept: GENERAL RADIOLOGY | Age: 73
End: 2018-09-15
Attending: EMERGENCY MEDICINE
Payer: COMMERCIAL

## 2018-09-15 VITALS
TEMPERATURE: 98.1 F | HEART RATE: 77 BPM | HEIGHT: 65 IN | SYSTOLIC BLOOD PRESSURE: 150 MMHG | DIASTOLIC BLOOD PRESSURE: 60 MMHG | BODY MASS INDEX: 27.05 KG/M2 | OXYGEN SATURATION: 93 % | RESPIRATION RATE: 20 BRPM | WEIGHT: 162.38 LBS

## 2018-09-15 DIAGNOSIS — J18.9 COMMUNITY ACQUIRED PNEUMONIA OF LEFT LUNG, UNSPECIFIED PART OF LUNG: Primary | ICD-10-CM

## 2018-09-15 LAB
ALBUMIN SERPL-MCNC: 3.1 G/DL (ref 3.5–5)
ALBUMIN/GLOB SERPL: 0.7 {RATIO} (ref 1.1–2.2)
ALP SERPL-CCNC: 62 U/L (ref 45–117)
ALT SERPL-CCNC: 21 U/L (ref 12–78)
ANION GAP SERPL CALC-SCNC: 11 MMOL/L (ref 5–15)
AST SERPL-CCNC: 22 U/L (ref 15–37)
BASOPHILS # BLD: 0 K/UL (ref 0–0.1)
BASOPHILS NFR BLD: 0 % (ref 0–1)
BILIRUB SERPL-MCNC: 0.4 MG/DL (ref 0.2–1)
BUN SERPL-MCNC: 12 MG/DL (ref 6–20)
BUN/CREAT SERPL: 16 (ref 12–20)
CALCIUM SERPL-MCNC: 8.4 MG/DL (ref 8.5–10.1)
CHLORIDE SERPL-SCNC: 94 MMOL/L (ref 97–108)
CO2 SERPL-SCNC: 24 MMOL/L (ref 21–32)
COMMENT, HOLDF: NORMAL
CREAT SERPL-MCNC: 0.75 MG/DL (ref 0.55–1.02)
DIFFERENTIAL METHOD BLD: ABNORMAL
EOSINOPHIL # BLD: 0 K/UL (ref 0–0.4)
EOSINOPHIL NFR BLD: 0 % (ref 0–7)
ERYTHROCYTE [DISTWIDTH] IN BLOOD BY AUTOMATED COUNT: 12.9 % (ref 11.5–14.5)
GLOBULIN SER CALC-MCNC: 4.3 G/DL (ref 2–4)
GLUCOSE SERPL-MCNC: 123 MG/DL (ref 65–100)
HCT VFR BLD AUTO: 37.8 % (ref 35–47)
HGB BLD-MCNC: 13 G/DL (ref 11.5–16)
IMM GRANULOCYTES # BLD: 0.1 K/UL (ref 0–0.04)
IMM GRANULOCYTES NFR BLD AUTO: 1 % (ref 0–0.5)
LACTATE BLD-SCNC: 0.8 MMOL/L (ref 0.4–2)
LYMPHOCYTES # BLD: 1 K/UL (ref 0.8–3.5)
LYMPHOCYTES NFR BLD: 9 % (ref 12–49)
MCH RBC QN AUTO: 31.4 PG (ref 26–34)
MCHC RBC AUTO-ENTMCNC: 34.4 G/DL (ref 30–36.5)
MCV RBC AUTO: 91.3 FL (ref 80–99)
MONOCYTES # BLD: 0.6 K/UL (ref 0–1)
MONOCYTES NFR BLD: 6 % (ref 5–13)
NEUTS SEG # BLD: 9.1 K/UL (ref 1.8–8)
NEUTS SEG NFR BLD: 84 % (ref 32–75)
NRBC # BLD: 0 K/UL (ref 0–0.01)
NRBC BLD-RTO: 0 PER 100 WBC
PLATELET # BLD AUTO: 233 K/UL (ref 150–400)
PMV BLD AUTO: 9.4 FL (ref 8.9–12.9)
POTASSIUM SERPL-SCNC: 3.9 MMOL/L (ref 3.5–5.1)
PROT SERPL-MCNC: 7.4 G/DL (ref 6.4–8.2)
RBC # BLD AUTO: 4.14 M/UL (ref 3.8–5.2)
SAMPLES BEING HELD,HOLD: NORMAL
SODIUM SERPL-SCNC: 129 MMOL/L (ref 136–145)
WBC # BLD AUTO: 10.8 K/UL (ref 3.6–11)

## 2018-09-15 PROCEDURE — 96361 HYDRATE IV INFUSION ADD-ON: CPT

## 2018-09-15 PROCEDURE — 71045 X-RAY EXAM CHEST 1 VIEW: CPT

## 2018-09-15 PROCEDURE — 74011000258 HC RX REV CODE- 258: Performed by: EMERGENCY MEDICINE

## 2018-09-15 PROCEDURE — 74011250636 HC RX REV CODE- 250/636: Performed by: EMERGENCY MEDICINE

## 2018-09-15 PROCEDURE — 93005 ELECTROCARDIOGRAM TRACING: CPT

## 2018-09-15 PROCEDURE — 83605 ASSAY OF LACTIC ACID: CPT

## 2018-09-15 PROCEDURE — 36415 COLL VENOUS BLD VENIPUNCTURE: CPT | Performed by: EMERGENCY MEDICINE

## 2018-09-15 PROCEDURE — 85025 COMPLETE CBC W/AUTO DIFF WBC: CPT | Performed by: EMERGENCY MEDICINE

## 2018-09-15 PROCEDURE — 74011250637 HC RX REV CODE- 250/637: Performed by: EMERGENCY MEDICINE

## 2018-09-15 PROCEDURE — 99284 EMERGENCY DEPT VISIT MOD MDM: CPT

## 2018-09-15 PROCEDURE — 80053 COMPREHEN METABOLIC PANEL: CPT | Performed by: EMERGENCY MEDICINE

## 2018-09-15 PROCEDURE — 87040 BLOOD CULTURE FOR BACTERIA: CPT | Performed by: EMERGENCY MEDICINE

## 2018-09-15 PROCEDURE — 96365 THER/PROPH/DIAG IV INF INIT: CPT

## 2018-09-15 RX ORDER — LEVOFLOXACIN 500 MG/1
500 TABLET, FILM COATED ORAL DAILY
Qty: 7 TAB | Refills: 0 | Status: SHIPPED | OUTPATIENT
Start: 2018-09-15 | End: 2018-09-21 | Stop reason: SINTOL

## 2018-09-15 RX ORDER — LEVOFLOXACIN 500 MG/1
500 TABLET, FILM COATED ORAL
Status: COMPLETED | OUTPATIENT
Start: 2018-09-15 | End: 2018-09-15

## 2018-09-15 RX ADMIN — LEVOFLOXACIN 500 MG: 500 TABLET, FILM COATED ORAL at 19:21

## 2018-09-15 RX ADMIN — SODIUM CHLORIDE 1000 ML: 900 INJECTION, SOLUTION INTRAVENOUS at 17:03

## 2018-09-15 RX ADMIN — SODIUM CHLORIDE 500 ML: 900 INJECTION, SOLUTION INTRAVENOUS at 16:31

## 2018-09-15 RX ADMIN — CEFTRIAXONE 1 G: 1 INJECTION, POWDER, FOR SOLUTION INTRAMUSCULAR; INTRAVENOUS at 19:19

## 2018-09-15 RX ADMIN — SODIUM CHLORIDE 1000 ML: 900 INJECTION, SOLUTION INTRAVENOUS at 18:09

## 2018-09-15 NOTE — ED PROVIDER NOTES
HPI Comments: 81 y.o. female with past medical history significant for hypercholesteremia, cervical arthritis, ovarian cancer, and HTN who presents from home with chief complaint of fever and joint pain. Patient says she has been feeling bad since 9/11/18. She describes achyness, significant joint pain, and a persistent cough at this time. She went to her PCP on 9/13/18 with a fever of 102 and was told that she likely had a fever. Patient notes that her cough has subsided as of the 14th, but her other symptoms remain. She measured a fever of 104 earlier today before arrival to ED, and additionally complains of dizziness and a decreased appetite. She says that the joint pain is still persistent, and that she is now experiencing a \"splitting\" headache as well. She notes some neck pain, but remarks that this is baseline secondary to arthritis in her neck. She denies any arthritis other than cervical arthritis. She denies any dysuria, increased frequency, chills, rash, or history of pneumonia. There are no other acute medical concerns at this time. Patient notes that she observed some bilateral leg swelling above baseline on 9/8, and then noticed bilateral pitting edema on 9/11. Patient takes HCTZ. Social hx: Patient denies use of tobacco. Patient uses EtOH (1-2 glasses wine nightly). Patient denies use of illicit drugs PCP: Kelby Andrew MD 
 
Note written by Karen Kingston, as dictated by Vitaliy Skinner MD 4:35 PM  
 
 
The history is provided by the patient and the spouse. No  was used. Past Medical History:  
Diagnosis Date  Adopted  Adverse effect of anesthesia   
 tremors and shaking after anesthesia/\"taks silly\"  BRCA negative 5/2013 Dr. Hemalatha Cardona  Cervical arthritis ([facility] [address]  History of ovarian cancer 2013  
 surgery/chemotherapy  Hypercholesterolemia  Hypertension  Ill-defined condition   
 hx passing out from dehydration  Other anxiety states  Other ill-defined conditions(799.89) 2010 BOWEL OBSTRUCTION  ? IMPACTION  
 Unspecified adverse effect of anesthesia TREMORS AFTERWARDS Past Surgical History:  
Procedure Laterality Date  COLONOSCOPY N/A 8/2/2017 COLONOSCOPY performed by Carlos Sears MD at [address]  HX DILATION AND CURETTAGE    
 HX GYN  1/11/13 RSO  HX GYN  2/13/13 TLH/LSO, omentectomy, lymph node dissection  HX TONSILLECTOMY AS CHILD  
 HX TUBAL LIGATION  1978  HX VASCULAR ACCESS    
 placed and removed  HX WISDOM TEETH EXTRACTION Family History:  
Problem Relation Age of Onset  Stroke Father  Hypertension Father  Parkinsonism Mother  Hypertension Mother  Dementia Mother  Diabetes Other Maternal great aunt  Heart Attack Other   
  maternal great aunt  Heart Disease Other   
  maternal great aunt  Drug Abuse Son   
  opioid Social History Social History  Marital status:  Spouse name: N/A  
 Number of children: N/A  
 Years of education: N/A Occupational History  Banking Retired Social History Main Topics  Smoking status: Never Smoker  Smokeless tobacco: Never Used  Alcohol use 6.0 oz/week 10 Glasses of wine per week Comment: 1-2 glasses nightly  Drug use: No  
 Sexual activity: No  
 
Other Topics Concern  Exercise Yes  
  walking 1/2 - 1 mile daily Social History Narrative . Adopted by great aunt and uncle. Retired from banking industry. Two adult sons, one currently at home with her with his wife and grandchild. ALLERGIES: Simvastatin; Codeine; and Lidocaine Review of Systems Constitutional: Positive for appetite change and fever. Negative for chills. Respiratory: Positive for cough. Genitourinary: Negative for dysuria and frequency. Musculoskeletal: Positive for arthralgias. Skin: Negative for rash. Neurological: Positive for dizziness and headaches. All other systems reviewed and are negative. Vitals:  
 09/15/18 1608 BP: 134/49 Pulse: 81 Resp: 22 Temp: (!) 100.7 °F (38.2 °C) SpO2: 92% Weight: 73.7 kg (162 lb 6 oz) Height: 5' 5\" (1.651 m) Physical Exam  
Constitutional: She appears well-developed and well-nourished. HENT:  
Head: Normocephalic and atraumatic. Mouth/Throat: Oropharynx is clear and moist.  
Eyes: EOM are normal. Pupils are equal, round, and reactive to light. Neck: Normal range of motion. Neck supple. Cardiovascular: Normal rate, regular rhythm, normal heart sounds and intact distal pulses. Exam reveals no gallop and no friction rub. No murmur heard. Pulmonary/Chest: Effort normal. No respiratory distress. She has no wheezes. She has no rales. Abdominal: Soft. There is no tenderness. There is no rebound. Musculoskeletal: Normal range of motion. She exhibits no tenderness. Neurological: She is alert. No cranial nerve deficit. Motor; symmetric Skin: No erythema. Psychiatric: She has a normal mood and affect. Her behavior is normal.  
Nursing note and vitals reviewed. Note written by Karen Garduno, as dictated by Alicia Dahl MD 4:35 PM  
 
[facility] 
 
 
ED Course Procedures Note: The patient is comfortable. Headache is less after fluids; patient has an infiltrate associated with cough and fever;CURB  65 score; confusion zero; BUN greater than 19 zero; age greater than 65; one; systolic blood pressure less than 90; zero; respiratory rate greater than 30; zero Alicia Dahl MD 
6:43 PM

## 2018-09-15 NOTE — DISCHARGE INSTRUCTIONS
Pneumonia: Care Instructions  Your Care Instructions    Pneumonia is an infection of the lungs. Most cases are caused by infections from bacteria or viruses. Pneumonia may be mild or very severe. If it is caused by bacteria, you will be treated with antibiotics. It may take a few weeks to a few months to recover fully from pneumonia, depending on how sick you were and whether your overall health is good. Follow-up care is a key part of your treatment and safety. Be sure to make and go to all appointments, and call your doctor if you are having problems. It's also a good idea to know your test results and keep a list of the medicines you take. How can you care for yourself at home? · Take your antibiotics exactly as directed. Do not stop taking the medicine just because you are feeling better. You need to take the full course of antibiotics. · Take your medicines exactly as prescribed. Call your doctor if you think you are having a problem with your medicine. · Get plenty of rest and sleep. You may feel weak and tired for a while, but your energy level will improve with time. · To prevent dehydration, drink plenty of fluids, enough so that your urine is light yellow or clear like water. Choose water and other caffeine-free clear liquids until you feel better. If you have kidney, heart, or liver disease and have to limit fluids, talk with your doctor before you increase the amount of fluids you drink. · Take care of your cough so you can rest. A cough that brings up mucus from your lungs is common with pneumonia. It is one way your body gets rid of the infection. But if coughing keeps you from resting or causes severe fatigue and chest-wall pain, talk to your doctor. He or she may suggest that you take a medicine to reduce the cough. · Use a vaporizer or humidifier to add moisture to your bedroom. Follow the directions for cleaning the machine. · Do not smoke or allow others to smoke around you.  Smoke will make your cough last longer. If you need help quitting, talk to your doctor about stop-smoking programs and medicines. These can increase your chances of quitting for good. · Take an over-the-counter pain medicine, such as acetaminophen (Tylenol), ibuprofen (Advil, Motrin), or naproxen (Aleve). Read and follow all instructions on the label. · Do not take two or more pain medicines at the same time unless the doctor told you to. Many pain medicines have acetaminophen, which is Tylenol. Too much acetaminophen (Tylenol) can be harmful. · If you were given a spirometer to measure how well your lungs are working, use it as instructed. This can help your doctor tell how your recovery is going. · To prevent pneumonia in the future, talk to your doctor about getting a flu vaccine (once a year) and a pneumococcal vaccine (one time only for most people). When should you call for help? Call 911 anytime you think you may need emergency care. For example, call if:    · You have severe trouble breathing.    Call your doctor now or seek immediate medical care if:    · You cough up dark brown or bloody mucus (sputum).     · You have new or worse trouble breathing.     · You are dizzy or lightheaded, or you feel like you may faint.    Watch closely for changes in your health, and be sure to contact your doctor if:    · You have a new or higher fever.     · You are coughing more deeply or more often.     · You are not getting better after 2 days (48 hours).     · You do not get better as expected. Where can you learn more? Go to http://iglesia-khoi.info/. Enter [id number] in the search box to learn more about \"Pneumonia: Care Instructions. \"  Current as of: December 6, 2017  Content Version: 11.7  © 2757-4097 [employer]. Care instructions adapted under license by [employer] (which disclaims liability or warranty for this information).  If you have questions about a medical condition or this instruction, always ask your healthcare professional. [place] [address] any warranty or liability for your use of this information.

## 2018-09-15 NOTE — ED TRIAGE NOTES
Triage Note: Patient is coming in with fever up to 104.8 prior to arrival to the ED. Patient has had fever, cough and weakness x 3 days. Patient has not taken any Tylenol or Motrin today. Patient also states having bad headache to bilateral temples.

## 2018-09-16 LAB
ATRIAL RATE: 72 BPM
CALCULATED P AXIS, ECG09: 35 DEGREES
CALCULATED R AXIS, ECG10: 55 DEGREES
CALCULATED T AXIS, ECG11: 33 DEGREES
DIAGNOSIS, 93000: NORMAL
P-R INTERVAL, ECG05: 162 MS
Q-T INTERVAL, ECG07: 412 MS
QRS DURATION, ECG06: 78 MS
QTC CALCULATION (BEZET), ECG08: 451 MS
VENTRICULAR RATE, ECG03: 72 BPM

## 2018-09-17 ENCOUNTER — PATIENT MESSAGE (OUTPATIENT)
Dept: INTERNAL MEDICINE CLINIC | Age: 73
End: 2018-09-17

## 2018-09-18 NOTE — TELEPHONE ENCOUNTER
From: Ramesh Nova  To: Areli Guadarrama MD  Sent: 9/17/2018 8:49 AM EDT  Subject: Non-Urgent Medical Question    Dr. Shanel Whitaker. This is Rich; and Melissa Swaticeasar asked me to send this message to you. On Saturday, her temperature was 104. She took 400 mg ibuprofen, and it went down, but she continued to feel bad, and went to the ER at [facility]. Dx: Left middle lobe pneumonia. She was given Rocephin IV in ER, and d/c'd home with Rx for Levofloxacin, which she started yesterday. Temp this am still 102; feels weak and horrible. She seemed to sleep a little better last night; and seemed to cough a little less. Also, Dr. Jamee Langford noted Sodium low in tests in ER (and I note low normal from Thursday), to the point that he recommended she only drink 1.0-1.5 liters of fluid per day. (Related to Hctz? Pneumonia? combination?) Any suggestions for tx or F/U?

## 2018-09-20 LAB
BACTERIA SPEC CULT: NORMAL
SERVICE CMNT-IMP: NORMAL

## 2018-09-21 ENCOUNTER — OFFICE VISIT (OUTPATIENT)
Dept: INTERNAL MEDICINE CLINIC | Age: 73
End: 2018-09-21

## 2018-09-21 VITALS
BODY MASS INDEX: 26.59 KG/M2 | TEMPERATURE: 97.1 F | SYSTOLIC BLOOD PRESSURE: 130 MMHG | OXYGEN SATURATION: 97 % | HEIGHT: 65 IN | WEIGHT: 159.6 LBS | DIASTOLIC BLOOD PRESSURE: 90 MMHG | HEART RATE: 69 BPM

## 2018-09-21 DIAGNOSIS — E87.1 HYPONATREMIA: ICD-10-CM

## 2018-09-21 DIAGNOSIS — J18.9 PNEUMONIA OF LEFT LOWER LOBE DUE TO INFECTIOUS ORGANISM: Primary | ICD-10-CM

## 2018-09-21 RX ORDER — BENZONATATE 200 MG/1
200 CAPSULE ORAL
Qty: 30 CAP | Refills: 0 | Status: SHIPPED | OUTPATIENT
Start: 2018-09-21 | End: 2018-10-01

## 2018-09-21 RX ORDER — AZITHROMYCIN 250 MG/1
TABLET, FILM COATED ORAL
Qty: 6 TAB | Refills: 0 | Status: SHIPPED | OUTPATIENT
Start: 2018-09-21 | End: 2018-09-26

## 2018-09-21 NOTE — PROGRESS NOTES
Reviewed record in preparation for visit and have obtained necessary documentation. Identified pt with two pt identifiers(name and ).       Health Maintenance Due   Topic    GLAUCOMA SCREENING Q2Y     Influenza Age 5 to Adult     [address]          Chief Complaint   Patient presents with    Follow-up     Pnemonia    Headache        Wt Readings from Last 3 Encounters:   18 159 lb 9.6 oz (72.4 kg)   09/15/18 162 lb 6 oz (73.7 kg)   18 162 lb 8 oz (73.7 kg)     Temp Readings from Last 3 Encounters:   09/15/18 98.1 °F (36.7 °C)   18 99.6 °F (37.6 °C) (Oral)   18 98 °F (36.7 °C) (Oral)     BP Readings from Last 3 Encounters:   09/15/18 150/60   18 142/80   18 150/80     Pulse Readings from Last 3 Encounters:   09/15/18 77   18 69   18 (!) 57           Learning Assessment:  :     Learning Assessment 2018 2018 1/10/2017 2015 2015 2014   PRIMARY LEARNER Patient Patient Patient Patient Patient Patient   HIGHEST LEVEL OF EDUCATION - PRIMARY LEARNER  SOME COLLEGE SOME COLLEGE SOME COLLEGE SOME COLLEGE SOME COLLEGE SOME COLLEGE   BARRIERS PRIMARY LEARNER - NONE NONE NONE NONE NONE   CO-LEARNER CAREGIVER - No No Yes No No   CO-LEARNER NAME - - - Toy - -   CO-LEARNER HIGHEST LEVEL OF EDUCATION - - - > 4 YEARS [address] ENGLISH ENGLISH ENGLISH   PRIMARY LANGUAGE CO-LEARNER - - - ENGLISH - -    NEED - - - No No -   LEARNER PREFERENCE PRIMARY DEMONSTRATION LISTENING LISTENING DEMONSTRATION DEMONSTRATION LISTENING     - - - LISTENING - -   LEARNING SPECIAL TOPICS - - - - no -   ANSWERED BY patient patient pt pt patient -   RELATIONSHIP SELF SELF SELF SELF SELF SELF   ASSESSMENT COMMENT - - - - none -       Depression Screening:  :     PHQ over the last two weeks 2018   Little interest or pleasure in doing things Not at all   Feeling down, depressed, irritable, or hopeless Not at all   Total Score PHQ 2 0       Fall Risk Assessment:  :     Fall Risk Assessment, last 12 mths 9/13/2018   Able to walk? Yes   Fall in past 12 months? No       Abuse Screening:  :     Abuse Screening Questionnaire 8/14/2018 1/22/2015   Do you ever feel afraid of your partner? N (No Data)   Are you in a relationship with someone who physically or mentally threatens you? N (No Data)   Is it safe for you to go home? Y (No Data)       Coordination of Care Questionnaire:  :     1) Have you been to an emergency room, urgent care clinic since your last visit? yes Yadira Asif 9/15/2018 DX: Pnu and low Sodium  Hospitalized since your last visit? no             2) Have you seen or consulted any other health care providers outside of [address] [place] [address] since your last visit? no  (Include any pap smears or colon screenings in this section.)    3) Do you have an Advance Directive on file? no    4) Are you interested in receiving information on Advance Directives? NO      Patient is accompanied by  I have received verbal consent from Peggie Fleischer to discuss any/all medical information while they are present in the room.

## 2018-09-21 NOTE — PROGRESS NOTES
Headache and ED Follow-up (Mary Chao DX: Pnuemonia   Low sodium 9/15/2018)       HPI:  Edson Martínez is a 81y.o. year old female who is here for a follow up visit. She was last seen by me on 9/13/2018. She reports the following:    Fever is gone. Cough occasional.  Some better. Sleeping better with mucinex DM  Throat is irritating cough. Getting palpitations and feeling weak with levaquin. - headache also. Assessment and Plan        1. Pneumonia of left lower lobe due to infectious organism [facility])  Ok to stop levaquin due to side effects. Afebrile now and not SOB. Suggest stopping and monitor for fever. If starts again, start zpack. Reviewed xray and it seems lateral did not . Repeat xray in a month  - azithromycin (ZITHROMAX) 250 mg tablet; TAKE 2 TABS THE FIRST DAY, AND THEN 1 TAB DAILY FOR 4 MORE DAYS  Dispense: 6 Tab; Refill: 0  - benzonatate (TESSALON) 200 mg capsule; Take 1 Cap by mouth three (3) times daily as needed for Cough for up to 10 days. Dispense: 30 Cap; Refill: 0  - XR CHEST PA LAT; Future  - METABOLIC PANEL, COMPREHENSIVE; Future  - CBC WITH AUTOMATED DIFF; Future  - C REACTIVE PROTEIN, QT; Future    2. Hyponatremia  Likely from pneumonia. Keep hydrating with electrolyte water. Repeat in 2 weeks. - azithromycin (ZITHROMAX) 250 mg tablet; TAKE 2 TABS THE FIRST DAY, AND THEN 1 TAB DAILY FOR 4 MORE DAYS  Dispense: 6 Tab; Refill: 0  - benzonatate (TESSALON) 200 mg capsule; Take 1 Cap by mouth three (3) times daily as needed for Cough for up to 10 days. Dispense: 30 Cap; Refill: 0  - XR CHEST PA LAT; Future  - METABOLIC PANEL, COMPREHENSIVE; Future  - CBC WITH AUTOMATED DIFF;  Future          Visit Vitals    /90 (BP 1 Location: Left arm, BP Patient Position: Sitting)    Pulse 69    Temp 97.1 °F (36.2 °C) (Oral)    Ht 5' 5\" (1.651 m)    Wt 159 lb 9.6 oz (72.4 kg)    SpO2 97%    BMI 26.56 kg/m2       Historical Data    Past Medical History:   Diagnosis Date  Adopted     Adverse effect of anesthesia     tremors and shaking after anesthesia/\"taks silly\"    BRCA negative 5/2013    Dr. Pastor Walden Cervical arthritis [facility])     History of ovarian cancer 2013    surgery/chemotherapy    Hypercholesterolemia     Hypertension     Ill-defined condition     hx passing out from dehydration    Other anxiety states     Other ill-defined conditions(799.89) 2010    BOWEL OBSTRUCTION  ? IMPACTION    Unspecified adverse effect of anesthesia     TREMORS AFTERWARDS       Past Surgical History:   Procedure Laterality Date    COLONOSCOPY N/A 8/2/2017    COLONOSCOPY performed by Nitin Vidales MD at [address] HX CHOLECYSTECTOMY  2000    HX DILATION AND CURETTAGE      HX GYN  1/11/13    RSO    HX GYN  2/13/13    TLH/LSO, omentectomy, lymph node dissection    HX TONSILLECTOMY      AS CHILD    HX TUBAL LIGATION  1978    HX VASCULAR ACCESS      placed and removed    HX WISDOM TEETH EXTRACTION         Outpatient Encounter Prescriptions as of 9/21/2018   Medication Sig Dispense Refill    azithromycin (ZITHROMAX) 250 mg tablet TAKE 2 TABS THE FIRST DAY, AND THEN 1 TAB DAILY FOR 4 MORE DAYS 6 Tab 0    benzonatate (TESSALON) 200 mg capsule Take 1 Cap by mouth three (3) times daily as needed for Cough for up to 10 days. 30 Cap 0    diclofenac (VOLTAREN) 1 % gel Apply 2 g to affected area three (3) times daily as needed. 100 g 1    olmesartan (BENICAR) 40 mg tablet Take 1 Tab by mouth daily. 30 Tab 6    escitalopram oxalate (LEXAPRO) 10 mg tablet TAKE 1 TABLET EVERY DAY 90 Tab 3    pravastatin (PRAVACHOL) 20 mg tablet TAKE ONE TABLET BY M0UTH EVERY NIGHT 90 Tab 3    FOLIC ACID/MULTIVIT-MIN/LUTEIN (CENTRUM SILVER PO) Take  by mouth. Takes one po once daily.  coenzyme q10 (CO Q-10) 300 mg cap Take 300 mg by mouth daily.  DOCOSAHEXANOIC ACID/EPA (FISH OIL PO) Take 1,200 mg by mouth daily.       estradiol (ESTRACE) 1 mg tablet TAKE ONE TABLET BY MOUTH DAILY 30 Tab 12    estradiol (ESTRACE) 0.01 % (0.1 mg/gram) vaginal cream Insert 1 g into vagina two (2) times a week. SUN and WED      ascorbic acid (VITAMIN C) 500 mg tablet Take 250 mg by mouth daily.  [DISCONTINUED] levoFLOXacin (LEVAQUIN) 500 mg tablet Take 1 Tab by mouth daily. 7 Tab 0    cyclobenzaprine (FLEXERIL) 10 mg tablet Take 1 Tab by mouth nightly as needed for Muscle Spasm(s). 20 Tab 0     No facility-administered encounter medications on file as of 9/21/2018. Allergies   Allergen Reactions    Simvastatin Myalgia    Codeine Other (comments)     Unable to function    Lidocaine Hives        Social History     Social History    Marital status:      Spouse name: N/A    Number of children: N/A    Years of education: N/A     Occupational History    Banking Retired     Social History Main Topics    Smoking status: Never Smoker    Smokeless tobacco: Never Used    Alcohol use 6.0 oz/week     10 Glasses of wine per week      Comment: 1-2 glasses nightly    Drug use: No    Sexual activity: No     Other Topics Concern    Exercise Yes     walking 1/2 - 1 mile daily     Social History Narrative    . Adopted by great aunt and uncle. Retired from [employer] industry. Two adult sons, one currently at home with her with his wife and grandchild. family history includes Dementia in her mother; Diabetes in an other family member; Drug Abuse in her son; Heart Attack in an other family member; Heart Disease in an other family member; Hypertension in her father and mother; Parkinsonism in her mother; Stroke in her father. Review of Systems   Constitutional: Positive for malaise/fatigue and weight loss. Negative for chills, diaphoresis and fever. HENT: Negative for congestion. Respiratory: Positive for cough. Negative for sputum production, shortness of breath and wheezing. Cardiovascular: Negative for chest pain. Musculoskeletal: Positive for myalgias.  Negative for joint pain.   Neurological: Positive for headaches. Negative for dizziness, tingling, tremors, sensory change and weakness. Endo/Heme/Allergies: Does not bruise/bleed easily. Psychiatric/Behavioral: Negative for depression. Physical Exam   Constitutional: She is oriented to person, place, and time. She appears well-developed and well-nourished. She is active. Non-toxic appearance. She does not have a sickly appearance. She does not appear ill. No distress. Eyes: Conjunctivae and EOM are normal. Right eye exhibits no discharge. No scleral icterus. Cardiovascular: Normal rate, regular rhythm, S1 normal, S2 normal, normal heart sounds and normal pulses. Exam reveals no gallop and no friction rub. Pulmonary/Chest: Effort normal and breath sounds normal. No respiratory distress. Abdominal: Soft. Bowel sounds are normal.   Musculoskeletal: She exhibits no edema or deformity. Lymphadenopathy:     She has no cervical adenopathy. Neurological: She is alert and oriented to person, place, and time. Skin: Skin is warm and dry. No rash noted. No pallor. Psychiatric: She has a normal mood and affect. Her behavior is normal.   Vitals reviewed. Ortho Exam      Orders Placed This Encounter    XR CHEST PA LAT     Standing Status:   Future     Standing Expiration Date:   10/22/2019     Order Specific Question:   Reason for Exam     Answer:   follow up pneumonia     Order Specific Question:   Is Patient Allergic to Contrast Dye?      Answer:   Unknown    METABOLIC PANEL, COMPREHENSIVE     Standing Status:   Future     Standing Expiration Date:   10/9/2018    CBC WITH AUTOMATED DIFF     Standing Status:   Future     Standing Expiration Date:   10/9/2018    C REACTIVE PROTEIN, QT     Standing Status:   Future     Standing Expiration Date:   3/21/2019    azithromycin (ZITHROMAX) 250 mg tablet     Sig: TAKE 2 TABS THE FIRST DAY, AND THEN 1 TAB DAILY FOR 4 MORE DAYS     Dispense:  6 Tab     Refill:  0 Stop levaquin    benzonatate (TESSALON) 200 mg capsule     Sig: Take 1 Cap by mouth three (3) times daily as needed for Cough for up to 10 days. Dispense:  30 Cap     Refill:  0        I have reviewed the patient's medical history in detail and updated the computerized patient record. We had a prolonged discussion about these complex clinical issues and went over the various important aspects to consider. All questions were answered. Advised her to call back or return to office if symptoms do not improve, change in nature, or persist.    She was given an after visit summary or informed of [facility] [facility] which includes patient instructions, diagnoses, current medications, & vitals. She expressed understanding with the diagnosis and plan.

## 2018-10-02 DIAGNOSIS — M79.644 THUMB PAIN, RIGHT: ICD-10-CM

## 2018-10-02 RX ORDER — DICLOFENAC SODIUM 10 MG/G
2 GEL TOPICAL
Qty: 100 G | Refills: 1 | Status: SHIPPED | OUTPATIENT
Start: 2018-10-02 | End: 2019-04-03 | Stop reason: ALTCHOICE

## 2018-10-08 ENCOUNTER — HOSPITAL ENCOUNTER (OUTPATIENT)
Dept: GENERAL RADIOLOGY | Age: 73
Discharge: HOME | End: 2018-10-08
Payer: COMMERCIAL

## 2018-10-08 DIAGNOSIS — E87.1 HYPONATREMIA: ICD-10-CM

## 2018-10-08 DIAGNOSIS — J18.9 PNEUMONIA OF LEFT LOWER LOBE DUE TO INFECTIOUS ORGANISM: ICD-10-CM

## 2018-10-08 PROCEDURE — 71046 X-RAY EXAM CHEST 2 VIEWS: CPT

## 2018-10-12 ENCOUNTER — PATIENT MESSAGE (OUTPATIENT)
Dept: INTERNAL MEDICINE CLINIC | Age: 73
End: 2018-10-12

## 2018-11-01 ENCOUNTER — HOSPITAL ENCOUNTER (OUTPATIENT)
Dept: MAMMOGRAPHY | Age: 73
Discharge: HOME | End: 2018-11-01
Attending: OBSTETRICS & GYNECOLOGY
Payer: COMMERCIAL

## 2018-11-01 DIAGNOSIS — Z12.31 VISIT FOR SCREENING MAMMOGRAM: ICD-10-CM

## 2018-11-01 PROCEDURE — 77063 BREAST TOMOSYNTHESIS BI: CPT

## 2018-11-19 RX ORDER — PRAVASTATIN SODIUM 20 MG/1
TABLET ORAL
Qty: 90 TAB | Refills: 3 | Status: SHIPPED | OUTPATIENT
Start: 2018-11-19 | End: 2019-02-15 | Stop reason: SDUPTHER

## 2019-01-12 ENCOUNTER — PATIENT MESSAGE (OUTPATIENT)
Dept: INTERNAL MEDICINE CLINIC | Age: 74
End: 2019-01-12

## 2019-01-12 RX ORDER — CYCLOBENZAPRINE HCL 10 MG
10 TABLET ORAL 2 TIMES DAILY
Qty: 20 TAB | Refills: 0 | Status: SHIPPED | OUTPATIENT
Start: 2019-01-12 | End: 2019-01-13 | Stop reason: ALTCHOICE

## 2019-01-13 ENCOUNTER — TELEPHONE (OUTPATIENT)
Dept: INTERNAL MEDICINE CLINIC | Age: 74
End: 2019-01-13

## 2019-01-13 RX ORDER — CYCLOBENZAPRINE HCL 5 MG
5 TABLET ORAL
Qty: 3 TAB | Refills: 0 | Status: SHIPPED | OUTPATIENT
Start: 2019-01-13 | End: 2019-04-03 | Stop reason: ALTCHOICE

## 2019-01-13 NOTE — TELEPHONE ENCOUNTER
On Call Note       Primary Care Doctor: Gareth Garsia     Reason for call: low back spasms      Has been having low back spasms in the last few days. Has used flexeril in the past and would like use this again    Management:   Called in flexeril to her [facility] davy acevesfrank. Need to make appointment for evaluation next week if no improvement. Orders Placed This Encounter    cyclobenzaprine (FLEXERIL) 5 mg tablet     Sig: Take 1 Tab by mouth nightly. Dispense:  3 Tab     Refill:  0        Reviewed with patient that if symptoms are getting worse that she should call back or see a licensed provider at an urgent care or ER. Answered all questions to patient's satisfaction. Please call your PCP next business day for an office visit with any unresolved issues as face to face encounters provide better care with an exam than phone call conversations.     Signed By: Ramin Kincaid MD     January 13, 2019

## 2019-01-13 NOTE — TELEPHONE ENCOUNTER
From: Lilian Salmon  To: Heber Levy MD  Sent: 1/12/2019 12:14 PM EST  Subject: Non-Urgent Medical Question    Dr. Isidro Kennedy. I've been having low back pain, which I'm almost certain is muscular. I've been using Motrin and heat, but still have pain. I had a spasm this morning while trying to put on shoes. In September, you prescribed generic Flexeril, but I threw them out, as I didn't think they were needed. [facility] advises there were no refills. Can you or someone on call please phone in a new prescription to see if that will break this muscle issue. I'm hesitant to go to [facility] to be exposed to the flu, etc., and I'm sure the ER will want multiple x-rays, etc., which I don't think is necessary unless this doesn't resolve the pain. I will make an appt if the Rx doesn't solve the issue, but am concerned with weekend & snow coming. Thank you.

## 2019-02-15 ENCOUNTER — OFFICE VISIT (OUTPATIENT)
Dept: INTERNAL MEDICINE CLINIC | Age: 74
End: 2019-02-15

## 2019-02-15 VITALS
TEMPERATURE: 98.4 F | WEIGHT: 166.6 LBS | RESPIRATION RATE: 16 BRPM | HEART RATE: 61 BPM | HEIGHT: 65 IN | SYSTOLIC BLOOD PRESSURE: 130 MMHG | DIASTOLIC BLOOD PRESSURE: 84 MMHG | BODY MASS INDEX: 27.76 KG/M2 | OXYGEN SATURATION: 97 %

## 2019-02-15 DIAGNOSIS — F41.9 ANXIETY: ICD-10-CM

## 2019-02-15 DIAGNOSIS — E78.2 HYPERCHOLESTEROLEMIA WITH HYPERTRIGLYCERIDEMIA: ICD-10-CM

## 2019-02-15 DIAGNOSIS — I10 ESSENTIAL HYPERTENSION: Primary | ICD-10-CM

## 2019-02-15 RX ORDER — ESCITALOPRAM OXALATE 10 MG/1
TABLET ORAL
Qty: 90 TAB | Refills: 3 | Status: SHIPPED | OUTPATIENT
Start: 2019-02-15 | End: 2019-08-15 | Stop reason: ALTCHOICE

## 2019-02-15 RX ORDER — OLMESARTAN MEDOXOMIL 40 MG/1
40 TABLET ORAL DAILY
Qty: 90 TAB | Refills: 3 | Status: SHIPPED | OUTPATIENT
Start: 2019-02-15 | End: 2019-08-15 | Stop reason: ALTCHOICE

## 2019-02-15 RX ORDER — PRAVASTATIN SODIUM 20 MG/1
TABLET ORAL
Qty: 90 TAB | Refills: 3 | Status: SHIPPED | OUTPATIENT
Start: 2019-02-15 | End: 2019-08-15 | Stop reason: ALTCHOICE

## 2019-02-15 NOTE — PROGRESS NOTES
Chief Complaint   Patient presents with    Hypertension     Reviewed record in preparation for visit and have obtained necessary documentation. Identified pt with two pt identifiers(name and ).       Health Maintenance Due   Topic    Shingrix Vaccine Age 49> (1 of 2)    GLAUCOMA SCREENING Q2Y          Chief Complaint   Patient presents with    Hypertension        Wt Readings from Last 3 Encounters:   02/15/19 166 lb 9.6 oz (75.6 kg)   18 159 lb 9.6 oz (72.4 kg)   09/15/18 162 lb 6 oz (73.7 kg)     Temp Readings from Last 3 Encounters:   02/15/19 98.4 °F (36.9 °C) (Oral)   18 97.1 °F (36.2 °C) (Oral)   09/15/18 98.1 °F (36.7 °C)     BP Readings from Last 3 Encounters:   02/15/19 130/88   18 130/90   09/15/18 150/60     Pulse Readings from Last 3 Encounters:   02/15/19 61   18 69   09/15/18 77           Learning Assessment:  :     Learning Assessment 2/15/2019 2018 2018 1/10/2017 2015 2015 2014   PRIMARY LEARNER Patient Patient Patient Patient Patient Patient Patient   HIGHEST LEVEL OF EDUCATION - PRIMARY LEARNER  Vero Ramos PRIMARY LEARNER NONE - NONE NONE NONE NONE NONE   CO-LEARNER CAREGIVER - - No No Yes No No   CO-LEARNER NAME - - - - Toy - -   CO-LEARNER HIGHEST LEVEL OF EDUCATION - - - - > 4 YEARS Shaye Chandra [id number] ENGLISH ENGLISH ENGLISH ENGLISH   PRIMARY LANGUAGE CO-LEARNER - - - - ENGLISH - -    NEED - - - - No No -   LEARNER PREFERENCE PRIMARY DEMONSTRATION DEMONSTRATION LISTENING LISTENING DEMONSTRATION DEMONSTRATION LISTENING     - - - - LISTENING - -   LEARNING SPECIAL TOPICS - - - - - no -   ANSWERED BY patient patient patient pt pt patient -   RELATIONSHIP SELF SELF SELF SELF SELF SELF SELF   ASSESSMENT COMMENT - - - - - none -       Depression Screening:  :     3 most recent PHQ Screens 9/13/2018   Little interest or pleasure in doing things Not at all   Feeling down, depressed, irritable, or hopeless Not at all   Total Score PHQ 2 0       Fall Risk Assessment:  :     Fall Risk Assessment, last 12 mths 9/13/2018   Able to walk? Yes   Fall in past 12 months? No       Abuse Screening:  :     Abuse Screening Questionnaire 2/15/2019 8/14/2018 1/22/2015   Do you ever feel afraid of your partner? N N (No Data)   Are you in a relationship with someone who physically or mentally threatens you? N N (No Data)   Is it safe for you to go home? Y Y (No Data)       Coordination of Care Questionnaire:  :     1) Have you been to an emergency room, urgent care clinic since your last visit? no   Hospitalized since your last visit? no             2) Have you seen or consulted any other health care providers outside of [address] [place] [address] since your last visit? no  (Include any pap smears or colon screenings in this section.)    3) Do you have an Advance Directive on file? no    4) Are you interested in receiving information on Advance Directives? NO      Patient is accompanied by spouse I have received verbal consent from Jose A Kaplan to discuss any/all medical information while they are present in the room. Reviewed record  In preparation for visit and have obtained necessary documentation.

## 2019-02-15 NOTE — PROGRESS NOTES
Hypertension       HPI:  Edgar Alcala is a 72y.o. year old female who is here for a follow up visit. She was last seen by me on 9/21/2018. She reports the following:    Doing well since pneumonia resolved. A lot of stress at that time.  now retired. They will be focusing on health    Mother has PD. She recently passed away. Hypertension    Patient has a history of HTN. The patient is taking hypertensive medications compliantly without side effects. Denies chest pain, dyspnea, edema, or symptoms of TIA's. BP Readings from Last 3 Encounters:   02/15/19 130/88   09/21/18 130/90   09/15/18 150/60       lexapro doing well. She is a worrier but overall doing better since stress is less for     Hyperlipidemia    Patient has history of hyperlipidemia that is being managed with medications. She has been taking her statin cholesterol medication regularly without side effects such as myalgias or upper abdominal pain, nausea or jaundice. Lab Results   Component Value Date/Time    Cholesterol, total 195 08/06/2018 08:53 AM    HDL Cholesterol 91 08/06/2018 08:53 AM    LDL, calculated 89 08/06/2018 08:53 AM    VLDL, calculated 15 08/06/2018 08:53 AM    Triglyceride 76 08/06/2018 08:53 AM    CHOL/HDL Ratio 2.7 03/31/2010 07:33 AM             Assessment and Plan        1. Essential hypertension  Tolerating medication. Denies dizziness that is positional, SOB, or chest pain. Understands the importance of compliance to reduce risk of future heart failure. Agreed to call if any of above symptoms develop and  stay on current regimen of    - olmesartan (BENICAR) 40 mg tablet; Take 1 Tab by mouth daily. Dispense: 90 Tab; Refill: 3    2. Hypercholesterolemia with hypertriglyceridemia  The nature of cardiac risk has been fully discussed with this patient. I have made her aware of her LDL target goal given her cardiovascular risk analysis. I have discussed the appropriate diet.  The need for lifelong compliance in order to reduce risk is stressed. A regular exercise program is recommended to help achieve and maintain normal body weight, fitness and improve lipid balance. The risks and benefits of medications were discussed. Last cholesterol labs reviewed with patient. Patient made aware to get liver checked every 6 months. Continue with  Pravachol. 3. Anxiety  Reviewed side effects of medication and states anxiety is stable. No impulsive thoughts, sleep is not affected by medication. Exercise helps reduce anxiety. No GI symptoms. Pt instructed to call if above symptoms and agreed to stay on   lexapro    Repeat /84              Visit Vitals  /88 (BP 1 Location: Left arm, BP Patient Position: Sitting)   Pulse 61   Temp 98.4 °F (36.9 °C) (Oral)   Resp 16   Ht 5' 5\" (1.651 m)   Wt 166 lb 9.6 oz (75.6 kg)   SpO2 97%   BMI 27.72 kg/m²       Historical Data    Past Medical History:   Diagnosis Date    Adopted     Adverse effect of anesthesia     tremors and shaking after anesthesia/\"taks silly\"    BRCA negative 5/2013    Dr. Jackson Beam Cervical arthritis     History of ovarian cancer 2013    surgery/chemotherapy    Hypercholesterolemia     Hypertension     Ill-defined condition     hx passing out from dehydration    Other anxiety states     Other ill-defined conditions(799.89) 2010    BOWEL OBSTRUCTION  ? IMPACTION    Unspecified adverse effect of anesthesia     TREMORS AFTERWARDS       Past Surgical History:   Procedure Laterality Date    COLONOSCOPY N/A 8/2/2017    COLONOSCOPY performed by Fahad Clifford MD at [facility] ENDOSCOPY    HX CHOLECYSTECTOMY  2000    HX DILATION AND CURETTAGE      HX GYN  1/11/13    RSO    HX GYN  2/13/13    TLH/LSO, omentectomy, lymph node dissection    HX TONSILLECTOMY      AS CHILD    HX TUBAL LIGATION  1978    HX VASCULAR ACCESS      placed and removed    HX WISDOM TEETH EXTRACTION         Outpatient Encounter Medications as of 2/15/2019 Medication Sig Dispense Refill    pravastatin (PRAVACHOL) 20 mg tablet TAKE ONE TABLET BY M0UTH EVERY NIGHT 90 Tab 3    escitalopram oxalate (LEXAPRO) 10 mg tablet TAKE 1 TABLET EVERY DAY 90 Tab 3    olmesartan (BENICAR) 40 mg tablet Take 1 Tab by mouth daily. 90 Tab 3    FOLIC ACID/MULTIVIT-MIN/LUTEIN (CENTRUM SILVER PO) Take  by mouth. Takes one po once daily.  coenzyme q10 (CO Q-10) 300 mg cap Take 300 mg by mouth daily.  DOCOSAHEXANOIC ACID/EPA (FISH OIL PO) Take 1,200 mg by mouth daily.  estradiol (ESTRACE) 1 mg tablet TAKE ONE TABLET BY MOUTH DAILY 30 Tab 12    estradiol (ESTRACE) 0.01 % (0.1 mg/gram) vaginal cream Insert 1 g into vagina two (2) times a week. SUN and WED      ascorbic acid (VITAMIN C) 500 mg tablet Take 250 mg by mouth daily.  cyclobenzaprine (FLEXERIL) 5 mg tablet Take 1 Tab by mouth nightly. 3 Tab 0    [DISCONTINUED] pravastatin (PRAVACHOL) 20 mg tablet TAKE ONE TABLET BY M0UTH EVERY NIGHT 90 Tab 3    diclofenac (VOLTAREN) 1 % gel Apply 2 g to affected area three (3) times daily as needed. 100 g 1    [DISCONTINUED] olmesartan (BENICAR) 40 mg tablet Take 1 Tab by mouth daily. 30 Tab 6    [DISCONTINUED] escitalopram oxalate (LEXAPRO) 10 mg tablet TAKE 1 TABLET EVERY DAY 90 Tab 3     No facility-administered encounter medications on file as of 2/15/2019.          Allergies   Allergen Reactions    Simvastatin Myalgia    Codeine Other (comments)     Unable to function    Lidocaine Hives        Social History     Socioeconomic History    Marital status:      Spouse name: Not on file    Number of children: Not on file    Years of education: Not on file    Highest education level: Not on file   Social Needs    Financial resource strain: Not on file    Food insecurity - worry: Not on file    Food insecurity - inability: Not on file   isocket needs - medical: Not on file   isocket needs - non-medical: Not on file   Occupational History  Occupation: Payteller     Employer: RETIRED   Tobacco Use    Smoking status: Never Smoker    Smokeless tobacco: Never Used   Substance and Sexual Activity    Alcohol use: Yes     Alcohol/week: 6.0 oz     Types: 10 Glasses of wine per week     Comment: 1-2 glasses nightly    Drug use: No    Sexual activity: No   Other Topics Concern     Service Not Asked    Blood Transfusions Not Asked    Caffeine Concern Not Asked    Occupational Exposure Not Asked    Hobby Hazards Not Asked    Sleep Concern Not Asked    Stress Concern Not Asked    Weight Concern Not Asked    Special Diet Not Asked    Back Care Not Asked    Exercise Yes     Comment: walking 1/2 - 1 mile daily    Bike Helmet Not Asked    Seat Belt Not Asked    Self-Exams Not Asked   Social History Narrative    . Adopted by great aunt and uncle. Retired from [employer] industry. Two adult sons, one currently at home with her with his wife and grandchild. family history includes Dementia in her mother; Diabetes in an other family member; Drug Abuse in her son; Heart Attack in an other family member; Heart Disease in an other family member; Hypertension in her father and mother; Parkinsonism in her mother; Stroke in her father. Review of Systems   Constitutional: Negative for weight loss. Eyes: Negative for blurred vision. Respiratory: Negative for shortness of breath. Cardiovascular: Negative for chest pain. Gastrointestinal: Negative for abdominal pain. Genitourinary: Negative for dysuria and frequency. Skin: Negative for rash. Neurological: Negative for dizziness, focal weakness, weakness and headaches. Endo/Heme/Allergies: Negative for environmental allergies. Does not bruise/bleed easily. Physical Exam   Constitutional: She appears well-developed and well-nourished. She is active. Non-toxic appearance. She does not have a sickly appearance. She does not appear ill. No distress.    Eyes: Conjunctivae are normal. Right eye exhibits no discharge. Neck: Carotid bruit is not present. No thyroid mass and no thyromegaly present. Cardiovascular: Normal rate, regular rhythm, S1 normal, S2 normal, normal heart sounds and normal pulses. Exam reveals no gallop and no friction rub. Pulmonary/Chest: Effort normal and breath sounds normal. No respiratory distress. Abdominal: Soft. Bowel sounds are normal.   Musculoskeletal: She exhibits no edema or deformity. Neurological: She is alert. Coordination normal.   Skin: Skin is warm and dry. No rash noted. No pallor. Psychiatric: She has a normal mood and affect. Her behavior is normal.   Vitals reviewed. Ortho Exam      Orders Placed This Encounter    pravastatin (PRAVACHOL) 20 mg tablet     Sig: TAKE ONE TABLET BY M0UTH EVERY NIGHT     Dispense:  90 Tab     Refill:  3    escitalopram oxalate (LEXAPRO) 10 mg tablet     Sig: TAKE 1 TABLET EVERY DAY     Dispense:  90 Tab     Refill:  3    olmesartan (BENICAR) 40 mg tablet     Sig: Take 1 Tab by mouth daily. Dispense:  90 Tab     Refill:  3        I have reviewed the patient's medical history in detail and updated the computerized patient record. We had a prolonged discussion about these complex clinical issues and went over the various important aspects to consider. All questions were answered. Advised her to call back or return to office if symptoms do not improve, change in nature, or persist.    She was given an after visit summary or informed of [facility] Access which includes patient instructions, diagnoses, current medications, & vitals. She expressed understanding with the diagnosis and plan.

## 2019-04-03 ENCOUNTER — OFFICE VISIT (OUTPATIENT)
Dept: PRIMARY CARE CLINIC | Age: 74
End: 2019-04-03

## 2019-04-03 VITALS
TEMPERATURE: 98.3 F | OXYGEN SATURATION: 99 % | SYSTOLIC BLOOD PRESSURE: 138 MMHG | RESPIRATION RATE: 16 BRPM | BODY MASS INDEX: 27.86 KG/M2 | HEART RATE: 66 BPM | HEIGHT: 65 IN | DIASTOLIC BLOOD PRESSURE: 92 MMHG | WEIGHT: 167.2 LBS

## 2019-04-03 DIAGNOSIS — F41.9 ANXIETY: ICD-10-CM

## 2019-04-03 DIAGNOSIS — E78.2 MIXED HYPERLIPIDEMIA: ICD-10-CM

## 2019-04-03 DIAGNOSIS — Z85.43 HISTORY OF OVARIAN CANCER: ICD-10-CM

## 2019-04-03 DIAGNOSIS — I10 ESSENTIAL HYPERTENSION: Primary | ICD-10-CM

## 2019-04-03 DIAGNOSIS — R23.2 HOT FLASHES: ICD-10-CM

## 2019-04-03 NOTE — PROGRESS NOTES
Written by Aliyah Young, as dictated by Dr. Moraima Snider MD.    Dimitry Kingston is a 72 y.o. female. HPI  The patient comes in today to establish care. She is accompanied by her . Pt was previously under the care of Dr. Susan Hooker (). BP is high today at 194/71, 138/92 on repeat. Denies headaches but notes that she gets palpitations at night. Pt notes that her BP has been well controlled by olmesartan 40 mg. She was previously taking olmesartan-HCTZ but it was discontinued due to side effects including dizziness. The pt notes that she does not add salt to her food. Pt notes FHX of HTN. The patient weighs 167 lbs today and has gained weight from 159 lbs on 09/21/2018. She reports that she walks the dog daily. Pt has been trying to cut back her EtOH consumption. Pt has HX of HLD. Lipid panel was normal when last checked by pt is compliant on pravastatin 20 mg. Compliant on Lexapro 10 mg for anxiety, which provides relief. She has occasional constipation and tries to drink plenty of water. Followed by Dr. Iona Jimenez (gynecologic oncology) for hx of malignant neoplasm of ovary in 2013. She notes that she received chemotherapy. Pt has a follow-up in 07/2019. She has been taking Estrace 1 mg as when she stops she has significant joint pains. Followed by OB/GYN. Patient Active Problem List   Diagnosis Code    History of malignant neoplasm of ovary Z85.43    Anxiety F41.9    Ovarian cancer ([facility] [address] C56.9    Essential hypertension I10    Mixed hyperlipidemia E78.2        Current Outpatient Medications on File Prior to Visit   Medication Sig Dispense Refill    pravastatin (PRAVACHOL) 20 mg tablet TAKE ONE TABLET BY M0UTH EVERY NIGHT 90 Tab 3    escitalopram oxalate (LEXAPRO) 10 mg tablet TAKE 1 TABLET EVERY DAY 90 Tab 3    olmesartan (BENICAR) 40 mg tablet Take 1 Tab by mouth daily.  90 Tab 3    FOLIC ACID/MULTIVIT-MIN/LUTEIN (CENTRUM SILVER PO) Take by mouth. Takes one po once daily.  coenzyme q10 (CO Q-10) 300 mg cap Take 300 mg by mouth daily.  DOCOSAHEXANOIC ACID/EPA (FISH OIL PO) Take 1,200 mg by mouth daily.  estradiol (ESTRACE) 1 mg tablet TAKE ONE TABLET BY MOUTH DAILY 30 Tab 12    estradiol (ESTRACE) 0.01 % (0.1 mg/gram) vaginal cream Insert 1 g into vagina two (2) times a week. SUN and WED      ascorbic acid (VITAMIN C) 500 mg tablet Take 250 mg by mouth daily. No current facility-administered medications on file prior to visit. Allergies   Allergen Reactions    Simvastatin Myalgia    Codeine Other (comments)     Unable to function    Lidocaine Hives       Past Medical History:   Diagnosis Date    Adopted     Adverse effect of anesthesia     tremors and shaking after anesthesia/\"taks silly\"    BRCA negative 5/2013    Dr. Femi Nash Cervical arthritis     History of ovarian cancer 2013    surgery/chemotherapy    Hypercholesterolemia     Hypertension     Ill-defined condition     hx passing out from dehydration    Other anxiety states     Other ill-defined conditions(799.89) 2010    BOWEL OBSTRUCTION  ? IMPACTION    Unspecified adverse effect of anesthesia     TREMORS AFTERWARDS       Past Surgical History:   Procedure Laterality Date    COLONOSCOPY N/A 8/2/2017    [facility] at [facility]COPY    HX CHOLECYSTECTOMY  2000    HX DILATION AND CURETTAGE      HX GYN  1/11/13    RSO    HX GYN  2/13/13    TLH/LSO, omentectomy, lymph node dissection    HX TONSILLECTOMY      AS CHILD    HX TUBAL LIGATION  1978    HX VASCULAR ACCESS      placed and removed    HX WISDOM TEETH EXTRACTION         Family History   Problem Relation Age of Onset   [facility] Stroke Father     Hypertension Father     Parkinsonism Mother     Hypertension Mother     Dementia Mother     Diabetes Other         Maternal great aunt    Heart Attack Other         maternal great aunt    Heart Disease Other maternal great aunt    Drug Abuse Son         opioid       Social History     Socioeconomic History    Marital status:      Spouse name: Not on file    Number of children: Not on file    Years of education: Not on file    Highest education level: Not on file   Occupational History    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   Social Needs    Financial resource strain: Not on file    Food insecurity:     Worry: Not on file     Inability: Not on file    Transportation needs:     Medical: Not on file     Non-medical: Not on file   Tobacco Use    Smoking status: Never Smoker    Smokeless tobacco: Never Used   Substance and Sexual Activity    Alcohol use: Yes     Alcohol/week: 6.0 oz     Types: 10 Glasses of wine per week     Comment: 1-2 glasses nightly    Drug use: No    Sexual activity: Never   Lifestyle    Physical activity:     Days per week: Not on file     Minutes per session: Not on file    Stress: Not on file   Relationships    Social connections:     Talks on phone: Not on file     Gets together: Not on file     Attends Faith service: Not on file     Active member of club or organization: Not on file     Attends meetings of clubs or organizations: Not on file     Relationship status: Not on file    Intimate partner violence:     Fear of current or ex partner: Not on file     Emotionally abused: Not on file     Physically abused: Not on file     Forced sexual activity: Not on file   Other Topics Concern     Service Not Asked    Blood Transfusions Not Asked    Caffeine Concern Not Asked    Occupational Exposure Not Asked   Dasie Hanks Hazards Not Asked    Sleep Concern Not Asked    Stress Concern Not Asked    Weight Concern Not Asked    Special Diet Not Asked    Back Care Not Asked    Exercise Yes     Comment: walking 1/2 - 1 mile daily    Bike Helmet Not Asked    Seat Belt Not Asked    Self-Exams Not Asked   Social History Narrative    .   Adopted by great aunt and uncle. Retired from banking industry. Two adult sons, one currently at home with her with his wife and grandchild. Review of Systems   Constitutional: Negative for malaise/fatigue. HENT: Negative for congestion. Eyes: Negative for blurred vision and pain. Respiratory: Negative for cough and shortness of breath. Cardiovascular: Positive for palpitations. Negative for chest pain. Gastrointestinal: Positive for constipation (occasional). Negative for abdominal pain and heartburn. Genitourinary: Negative for frequency and urgency. Musculoskeletal: Negative for joint pain and myalgias. Neurological: Negative for dizziness, tingling, sensory change, weakness and headaches. Psychiatric/Behavioral: Negative for depression, memory loss and substance abuse. The patient is nervous/anxious (improved on lexapro). Visit Vitals  BP (!) 138/92 (BP 1 Location: Left arm, BP Patient Position: Sitting) Comment: White Coat Syndrome   Pulse 66   Temp 98.3 °F (36.8 °C) (Oral)   Resp 16   Ht 5' 5\" (1.651 m)   Wt 167 lb 3.2 oz (75.8 kg)   SpO2 99%   BMI 27.82 kg/m²       Physical Exam   Constitutional: She is oriented to person, place, and time. She appears well-developed and well-nourished. No distress. HENT:   Right Ear: External ear normal.   Left Ear: External ear normal.   Eyes: Conjunctivae and EOM are normal. Right eye exhibits no discharge. Left eye exhibits no discharge. Neck: Normal range of motion. Neck supple. Cardiovascular: Normal rate and regular rhythm. Pulmonary/Chest: Effort normal and breath sounds normal. She has no wheezes. Abdominal: Soft. Bowel sounds are normal. There is no tenderness. Lymphadenopathy:     She has no cervical adenopathy. Neurological: She is alert and oriented to person, place, and time. Skin: She is not diaphoretic. Psychiatric: She has a normal mood and affect. Her behavior is normal.   Nursing note and vitals reviewed.       ASSESSMENT and PLAN    ICD-10-CM ICD-9-CM    1. Essential hypertension I10 401.9 She should continue olmesartan 40 mg daily. Advised pt to follow a low sodium diet. The pt should increase exercise. Pt should check her BP at different times on different days and keep a BP log. She should message me her readings in about 2 weeks. 2. History of ovarian cancer Z85.43 V10.43 Followed by gynecologic oncology and OB/GYN. 3. Mixed hyperlipidemia E78.2 272.2 Compliant on pravastatin 20 mg.   4. Anxiety F41.9 300.00 Doing well on Lexapro 10 mg.   5. Hot flashes R23.2 782.62 Doing well on Estrace 1 mg. This plan was reviewed with the patient and patient agrees. All questions were answered. This scribe documentation was reviewed by me and accurately reflects the examination and decisions made by me. This note will not be viewable in [address]

## 2019-04-03 NOTE — PROGRESS NOTES
Visit Vitals  /71 (BP 1 Location: Left arm, BP Patient Position: Sitting)   Pulse 66   Temp 98.3 °F (36.8 °C) (Oral)   Resp 16   Ht 5' 5\" (1.651 m)   Wt 167 lb 3.2 oz (75.8 kg)   SpO2 99%   BMI 27.82 kg/m²           Chief Complaint   Patient presents with   [address]     Patient Needs a PCP                1. Have you been to the ER, urgent care clinic since your last visit? Hospitalized since your last visit? Denies     2. Have you seen or consulted any other health care providers outside of the [address] [place] [address] since your last visit? Include any pap smears or colon screening.  Denies

## 2019-04-08 RX ORDER — ESTRADIOL 1 MG/1
TABLET ORAL
Qty: 90 TAB | Refills: 2 | Status: SHIPPED | OUTPATIENT
Start: 2019-04-08 | End: 2019-10-15 | Stop reason: SDUPTHER

## 2019-07-17 ENCOUNTER — PATIENT MESSAGE (OUTPATIENT)
Dept: GYNECOLOGY | Age: 74
End: 2019-07-17

## 2019-07-18 NOTE — TELEPHONE ENCOUNTER
From: Zoya Ha  Sent: 7/17/2019 4:15 PM EDT  Subject: Prescription Question    Please change my preferred pharmacy to [facility]; [address] [facility]; [phone]. Thank you.

## 2019-07-18 NOTE — TELEPHONE ENCOUNTER
Per pt's request, pharmacy was updated to [facility]; [address] [facility]; [phone].

## 2019-07-23 ENCOUNTER — OFFICE VISIT (OUTPATIENT)
Dept: GYNECOLOGY | Age: 74
End: 2019-07-23

## 2019-07-23 VITALS
DIASTOLIC BLOOD PRESSURE: 79 MMHG | HEIGHT: 65 IN | SYSTOLIC BLOOD PRESSURE: 157 MMHG | HEART RATE: 66 BPM | BODY MASS INDEX: 27.96 KG/M2 | WEIGHT: 167.8 LBS

## 2019-07-23 DIAGNOSIS — C56.1 MALIGNANT NEOPLASM OF RIGHT OVARY (HCC): Primary | ICD-10-CM

## 2019-07-23 NOTE — PROGRESS NOTES
[address], [address]  [phone]  MD Claire Garrett MD  Office Visit    Patient ID:  Name: Wili Diane  MRN: [id number]  : 1945/73 y.o. Visit date: 2019    INTERVAL HISTORY:  Wili Diane is a  female with a diagnosis of stage IC, grade 3, ovarian carcinoma (clear cell). She is s/p surgical resection in 2013 and completed 6 cycles of Taxol and Carboplatin chemotherapy. She tolerated chemotherapy quite well. Her post-treatment PET/CT in 2013 showed no evidence of residual or metastatic disease. She has been followed since that time without evidence of disease. She presents today for routine surveillance. She is doing well and is without complaints. She denies any vaginal bleeding or discharge, any pelvic or abdominal pain, or any changes in her bowel or bladder habits. Her most recent CA-125 in 2018 was normal.  Her last CT of the abdomen/pelvis in 2015 showed no evidence of disease. PMH:  Past Medical History:   Diagnosis Date    Adopted     Adverse effect of anesthesia     tremors and shaking after anesthesia/\"taks silly\"    BRCA negative 2013    Dr. Zara Ponce Cervical arthritis     History of ovarian cancer 2013    surgery/chemotherapy    Hypercholesterolemia     Hypertension     Ill-defined condition     hx passing out from dehydration    Other anxiety states     Other ill-defined conditions(799.89) 2010    BOWEL OBSTRUCTION  ? IMPACTION    Unspecified adverse effect of anesthesia     TREMORS AFTERWARDS     PSH:  Past Surgical History:   Procedure Laterality Date    COLONOSCOPY N/A 2017    COL[facility] by [facility]z Jack [facility] at [facility]COPY    HX CHOLECYSTECTOMY      HX DILATION AND CURETTAGE      HX GYN  13    RSO    HX GYN  13    TLH/LSO, omentectomy, lymph node dissection    HX TONSILLECTOMY AS CHILD    HX TUBAL LIGATION  1978    HX VASCULAR ACCESS      placed and removed    HX WISDOM TEETH EXTRACTION       SOC:  Social History     Socioeconomic History    Marital status:      Spouse name: Not on file    Number of children: Not on file    Years of education: Not on file    Highest education level: Not on file   Occupational History    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   Social Needs    Financial resource strain: Not on file    Food insecurity:     Worry: Not on file     Inability: Not on file    Transportation needs:     Medical: Not on file     Non-medical: Not on file   Tobacco Use    Smoking status: Never Smoker    Smokeless tobacco: Never Used   Substance and Sexual Activity    Alcohol use:  Yes     Alcohol/week: 10.0 standard drinks     Types: 10 Glasses of wine per week     Comment: 1-2 glasses nightly    Drug use: No    Sexual activity: Never   Lifestyle    Physical activity:     Days per week: Not on file     Minutes per session: Not on file    Stress: Not on file   Relationships    Social connections:     Talks on phone: Not on file     Gets together: Not on file     Attends Mandaen service: Not on file     Active member of club or organization: Not on file     Attends meetings of clubs or organizations: Not on file     Relationship status: Not on file    Intimate partner violence:     Fear of current or ex partner: Not on file     Emotionally abused: Not on file     Physically abused: Not on file     Forced sexual activity: Not on file   Other Topics Concern     Service Not Asked    Blood Transfusions Not Asked    Caffeine Concern Not Asked    Occupational Exposure Not Asked   Cooper Guile Hazards Not Asked    Sleep Concern Not Asked    Stress Concern Not Asked    Weight Concern Not Asked    Special Diet Not Asked    Back Care Not Asked    Exercise Yes     Comment: walking 1/2 - 1 mile daily    Bike Helmet Not Asked   [place] Not Asked    Self-Exams Not Asked   Social History Narrative    . Adopted by great aunt and uncle. Retired from banking industry. Two adult sons, one currently at home with her with his wife and grandchild. Family History  Family History   Problem Relation Age of Onset   [facility] Stroke Father     Hypertension Father    [facility] Parkinsonism Mother     Hypertension Mother     Dementia Mother     Diabetes Other         Maternal great aunt    Heart Attack Other         maternal great aunt    Heart Disease Other         maternal great aunt    Drug Abuse Son         opioid     Medications:  Current Outpatient Medications on File Prior to Visit   Medication Sig Dispense Refill    estradiol (ESTRACE) 1 mg tablet TAKE 1 TAB BY MOUTH DAILY. 90 Tab 2    pravastatin (PRAVACHOL) 20 mg tablet TAKE ONE TABLET BY M0UTH EVERY NIGHT 90 Tab 3    escitalopram oxalate (LEXAPRO) 10 mg tablet TAKE 1 TABLET EVERY DAY 90 Tab 3    olmesartan (BENICAR) 40 mg tablet Take 1 Tab by mouth daily. 90 Tab 3    FOLIC ACID/MULTIVIT-MIN/LUTEIN (CENTRUM SILVER PO) Take  by mouth. Takes one po once daily.  coenzyme q10 (CO Q-10) 300 mg cap Take 300 mg by mouth daily.  DOCOSAHEXANOIC ACID/EPA (FISH OIL PO) Take 1,200 mg by mouth daily.  estradiol (ESTRACE) 0.01 % (0.1 mg/gram) vaginal cream Insert 1 g into vagina two (2) times a week. SUN and WED      ascorbic acid (VITAMIN C) 500 mg tablet Take 250 mg by mouth daily. No current facility-administered medications on file prior to visit. Allergies: Allergies   Allergen Reactions    Simvastatin Myalgia    Codeine Other (comments)     Unable to function    Lidocaine Hives       ROS:  Negative except for that mentioned above.     OBJECTIVE:    PHYSICAL EXAM  VITAL SIGNS: Visit Vitals  /79 (BP 1 Location: Right arm, BP Patient Position: Sitting)   Pulse 66   Ht 5' 5\" (1.651 m)   Wt 167 lb 12.8 oz (76.1 kg)   BMI 27.92 kg/m²      GENERAL ZOILA: in no apparent distress and well developed and well nourished   HEENT: within normal limits   RESPIRATORY: lungs clear to auscultation and percussion   CARDIOVASC: Regular rate and rhythm without [facility]   GASTROINT: soft, non-tender, without masses or organomegaly   MUSCULOSKEL: no joint tenderness, deformity or swelling   EXTREMITIES: extremities normal, atraumatic, no cyanosis or edema   PELVIC: Normal external genitalia. Normal vagina. Uterus, cervix, and adnexa surgically absent. No masses or nodularity. RECTAL: Exam deferred. JAH SURVEY: Cervical, supraclavicular, and axillary nodes normal.   NEURO: Grossly normal       Lab Results   Component Value Date/Time    WBC 10.5 10/04/2018 12:13 PM    HGB 13.2 10/04/2018 12:13 PM    HCT 39.4 10/04/2018 12:13 PM    PLATELET 968 (H) 39/27/9624 12:13 PM    MCV 91 10/04/2018 12:13 PM     Lab Results   Component Value Date/Time    Sodium 136 10/04/2018 12:13 PM    Potassium 4.8 10/04/2018 12:13 PM    Chloride 98 10/04/2018 12:13 PM    CO2 22 10/04/2018 12:13 PM    Anion gap 11 09/15/2018 04:23 PM    Glucose 87 10/04/2018 12:13 PM    BUN 14 10/04/2018 12:13 PM    Creatinine 0.66 10/04/2018 12:13 PM    BUN/Creatinine ratio 21 10/04/2018 12:13 PM    GFR est  10/04/2018 12:13 PM    GFR est non-AA 90 10/04/2018 12:13 PM    Calcium 10.0 10/04/2018 12:13 PM       CT of abdomen/pelvis (7/16/15)  Limited images of the lung bases demonstrate a 3 mm nodule in the right    lower lobe (series 3, image 6) that is stable dating back to least a    December 2009. There is no new lung nodule, mass, or consolidation. The    heart size is normal. There is no pericardial or pleural effusion.     The liver, spleen, pancreas, and adrenal glands are normal. The kidneys are    symmetric without hydronephrosis.  The gall bladder is surgically absent    without intra- or extra-hepatic biliary dilatation.        There are no dilated bowel loops.  The appendix is normal.  There are no    enlarged lymph nodes.  There is no free fluid or free air.     The urinary bladder is normal.  There is no pelvic mass.  The bony    structures are age-appropriate. There is no aggressive blastic or lytic    osseous lesion.        IMPRESSION:    Stable exam with no evidence for disease recurrence or metastatic disease in    the abdomen or pelvis. IMPRESSION AND PLAN:  Wili Diane has a diagnosis of stage IC, grade 3, ovarian CA (clear cell), managed with surgical resection followed by adjuvant Taxol and Carboplatin chemotherapy. She has no evidence of disease based upon today's examination. I will plan to see her back in one year for continued surveillance.         Claire Harden MD  7/23/2019/10:56 AM

## 2019-08-01 DIAGNOSIS — Z00.00 ANNUAL PHYSICAL EXAM: Primary | ICD-10-CM

## 2019-08-02 LAB
ALBUMIN SERPL-MCNC: 4.1 G/DL (ref 3.5–4.8)
ALBUMIN/GLOB SERPL: 1.3 {RATIO} (ref 1.2–2.2)
ALP SERPL-CCNC: 66 IU/L (ref 39–117)
ALT SERPL-CCNC: 18 IU/L (ref 0–32)
AST SERPL-CCNC: 24 IU/L (ref 0–40)
BILIRUB SERPL-MCNC: 0.4 MG/DL (ref 0–1.2)
BUN SERPL-MCNC: 13 MG/DL (ref 8–27)
BUN/CREAT SERPL: 19 (ref 12–28)
CALCIUM SERPL-MCNC: 9.6 MG/DL (ref 8.7–10.3)
CANCER AG125 SERPL-ACNC: 9.1 U/ML (ref 0–38.1)
CHLORIDE SERPL-SCNC: 98 MMOL/L (ref 96–106)
CHOLEST SERPL-MCNC: 227 MG/DL (ref 100–199)
CO2 SERPL-SCNC: 23 MMOL/L (ref 20–29)
CREAT SERPL-MCNC: 0.68 MG/DL (ref 0.57–1)
ERYTHROCYTE [DISTWIDTH] IN BLOOD BY AUTOMATED COUNT: 13.9 % (ref 12.3–15.4)
GLOBULIN SER CALC-MCNC: 3.1 G/DL (ref 1.5–4.5)
GLUCOSE SERPL-MCNC: 84 MG/DL (ref 65–99)
HCT VFR BLD AUTO: 42.4 % (ref 34–46.6)
HDLC SERPL-MCNC: 91 MG/DL
HGB BLD-MCNC: 14.1 G/DL (ref 11.1–15.9)
LDLC SERPL CALC-MCNC: 104 MG/DL (ref 0–99)
MCH RBC QN AUTO: 31.1 PG (ref 26.6–33)
MCHC RBC AUTO-ENTMCNC: 33.3 G/DL (ref 31.5–35.7)
MCV RBC AUTO: 94 FL (ref 79–97)
PLATELET # BLD AUTO: 386 X10E3/UL (ref 150–450)
POTASSIUM SERPL-SCNC: 5 MMOL/L (ref 3.5–5.2)
PROT SERPL-MCNC: 7.2 G/DL (ref 6–8.5)
RBC # BLD AUTO: 4.53 X10E6/UL (ref 3.77–5.28)
SODIUM SERPL-SCNC: 136 MMOL/L (ref 134–144)
TRIGL SERPL-MCNC: 161 MG/DL (ref 0–149)
TSH SERPL DL<=0.005 MIU/L-ACNC: 1.2 UIU/ML (ref 0.45–4.5)
VLDLC SERPL CALC-MCNC: 32 MG/DL (ref 5–40)
WBC # BLD AUTO: 8.3 X10E3/UL (ref 3.4–10.8)

## 2019-08-15 ENCOUNTER — OFFICE VISIT (OUTPATIENT)
Dept: PRIMARY CARE CLINIC | Age: 74
End: 2019-08-15

## 2019-08-15 VITALS
DIASTOLIC BLOOD PRESSURE: 82 MMHG | OXYGEN SATURATION: 98 % | TEMPERATURE: 98.1 F | BODY MASS INDEX: 27.82 KG/M2 | HEIGHT: 65 IN | RESPIRATION RATE: 16 BRPM | HEART RATE: 61 BPM | SYSTOLIC BLOOD PRESSURE: 152 MMHG | WEIGHT: 167 LBS

## 2019-08-15 DIAGNOSIS — F41.9 ANXIETY AND DEPRESSION: ICD-10-CM

## 2019-08-15 DIAGNOSIS — Z23 NEED FOR SHINGLES VACCINE: ICD-10-CM

## 2019-08-15 DIAGNOSIS — I10 ESSENTIAL HYPERTENSION: ICD-10-CM

## 2019-08-15 DIAGNOSIS — F32.A ANXIETY AND DEPRESSION: ICD-10-CM

## 2019-08-15 DIAGNOSIS — E78.2 MIXED HYPERLIPIDEMIA: ICD-10-CM

## 2019-08-15 DIAGNOSIS — Z00.00 PHYSICAL EXAM: Primary | ICD-10-CM

## 2019-08-15 RX ORDER — PRAVASTATIN SODIUM 40 MG/1
40 TABLET ORAL
Qty: 90 TAB | Refills: 0 | Status: SHIPPED | OUTPATIENT
Start: 2019-08-15 | End: 2019-08-15 | Stop reason: SDUPTHER

## 2019-08-15 RX ORDER — TELMISARTAN 80 MG/1
80 TABLET ORAL DAILY
Qty: 90 TAB | Refills: 0 | Status: SHIPPED | OUTPATIENT
Start: 2019-08-15 | End: 2019-11-07 | Stop reason: SDUPTHER

## 2019-08-15 RX ORDER — TELMISARTAN 80 MG/1
80 TABLET ORAL DAILY
Qty: 90 TAB | Refills: 0 | Status: SHIPPED | OUTPATIENT
Start: 2019-08-15 | End: 2019-08-15 | Stop reason: SDUPTHER

## 2019-08-15 RX ORDER — ESCITALOPRAM OXALATE 20 MG/1
20 TABLET ORAL DAILY
Qty: 30 TAB | Refills: 2 | Status: SHIPPED | OUTPATIENT
Start: 2019-08-15 | End: 2019-08-15 | Stop reason: SDUPTHER

## 2019-08-15 RX ORDER — ESCITALOPRAM OXALATE 20 MG/1
20 TABLET ORAL DAILY
Qty: 90 TAB | Refills: 0 | Status: SHIPPED | OUTPATIENT
Start: 2019-08-15 | End: 2019-11-13

## 2019-08-15 RX ORDER — PRAVASTATIN SODIUM 40 MG/1
40 TABLET ORAL
Qty: 90 TAB | Refills: 0 | Status: SHIPPED | OUTPATIENT
Start: 2019-08-15 | End: 2019-11-07 | Stop reason: SDUPTHER

## 2019-08-15 NOTE — PROGRESS NOTES
Written by Francia Mccauley, as dictated by Dr. Carey Moya MD.    Clif Zamora is a 72 y.o. female. HPI  The patient comes in today for a complete physical examination and to discuss labs. She brought her 21 & Me DNA results which showed she has an increased his for Alzheimer's. She found out recently that her biological mother recently  of dementia. She is accompanied by her . Labs were drawn on 19. Lipid Panel shows total cholesterol was high at 227, triglyceride was high at 161, and LDL was elevated in 104.  was 9.1. CBC, CMP, and TSH were normal. She denies constipation, and  issues. She is compliant on Pravachol 20 mg daily. She reports a F Hx of hyperlipidemia (father). She was previously on Losartan-HCTZ, and reported leg issues. She reports that she has been having difficulty filling her medications with the changes in insurance. She reports that at times she will \"splurge\" on sweets as a comfort food, but otherwise avoids red meats and pork. She reports that she will get \"twitchy legs\" at night. BP is high today at 162/82, 152/88 on repeat. She reports her BP at home is normal. She reports that she has been under stress recently. She reports that she has been under a lot of stress recently, with family issues and her  losing her job. She reports that her daughter-in-law recently came out as lawson after 6 years of marriage, which has been on her mind, as her son is in the process of separation, and her grandchildren. She is stressed at the thought of her grandchildren and son moving into her home. Her  explains that her son will be having difficulty finding a job due to a prior felony conviction associated with opioid addiction. She explained that she is someone who tends to internalize stressful situations. She feels like Lexapro 10 mg at night has helped her sleep better, but she does not feel rested.  Her  reports that she is not a crier. She reports that she has gained weight recently, which she attributes to her recent stress. She states that she walks at least 2 miles a day. Her  reports that she snores at times. She does not want to do the CPAP testing. Her last mammogram was in 2018, and will be receiving her mammogram soon. She will be changing to Medicare in 01/2020. She reports that they will be going to the [facility] to \"get away\" in a few weeks. She reports that she will be going to [facility] in November. Patient Active Problem List   Diagnosis Code    History of malignant neoplasm of ovary Z85.43    Anxiety F41.9    Ovarian cancer ([facility] [address] C56.9    Essential hypertension I10    Mixed hyperlipidemia E78.2        Current Outpatient Medications on File Prior to Visit   Medication Sig Dispense Refill    estradiol (ESTRACE) 1 mg tablet TAKE 1 TAB BY MOUTH DAILY. 90 Tab 2    FOLIC ACID/MULTIVIT-MIN/LUTEIN (CENTRUM SILVER PO) Take  by mouth. Takes one po once daily.  coenzyme q10 (CO Q-10) 300 mg cap Take 300 mg by mouth daily.  DOCOSAHEXANOIC ACID/EPA (FISH OIL PO) Take 1,200 mg by mouth daily.  estradiol (ESTRACE) 0.01 % (0.1 mg/gram) vaginal cream Insert 1 g into vagina two (2) times a week. SUN and WED      ascorbic acid (VITAMIN C) 500 mg tablet Take 250 mg by mouth daily. No current facility-administered medications on file prior to visit.         Allergies   Allergen Reactions    Simvastatin Myalgia    Codeine Other (comments)     Unable to function    Lidocaine Hives       Past Medical History:   Diagnosis Date    Adopted     Adverse effect of anesthesia     tremors and shaking after anesthesia/\"taks silly\"    BRCA negative 5/2013    Dr. Sabrina Huertas Cervical arthritis     History of ovarian cancer 2013    surgery/chemotherapy    Hypercholesterolemia     Hypertension     Ill-defined condition     hx passing out from dehydration    Other anxiety states  Other ill-defined conditions(799.89) 2010    BOWEL OBSTRUCTION  ? IMPACTION    Unspecified adverse effect of anesthesia     TREMORS AFTERWARDS       Past Surgical History:   Procedure Laterality Date    COLONOSCOPY N/A 8/2/2017    COL[facility] at [facility] ENDOSCOPY    HX CHOLECYSTECTOMY  2000    HX DILATION AND CURETTAGE      HX GYN  1/11/13    RSO    HX GYN  2/13/13    TLH/LSO, omentectomy, lymph node dissection    HX TONSILLECTOMY      AS CHILD    HX TUBAL LIGATION  1978    HX VASCULAR ACCESS      placed and removed    HX WISDOM TEETH EXTRACTION         Family History   Problem Relation Age of Onset   [facility] Stroke Father     Hypertension Father    [facility] Parkinsonism Mother     Hypertension Mother     Dementia Mother     Diabetes Other         Maternal great aunt    Heart Attack Other         maternal great aunt    Heart Disease Other         maternal great aunt    Drug Abuse Son         opioid       Social History     Socioeconomic History    Marital status:      Spouse name: Not on file    Number of children: Not on file    Years of education: Not on file    Highest education level: Not on file   Occupational History    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   Social Needs    Financial resource strain: Not on file    Food insecurity:     Worry: Not on file     Inability: Not on file    Transportation needs:     Medical: Not on file     Non-medical: Not on file   Tobacco Use    Smoking status: Never Smoker    Smokeless tobacco: Never Used   Substance and Sexual Activity    Alcohol use:  Yes     Alcohol/week: 10.0 standard drinks     Types: 10 Glasses of wine per week     Comment: 1-2 glasses nightly    Drug use: No    Sexual activity: Yes     Partners: Male   Lifestyle    Physical activity:     Days per week: Not on file     Minutes per session: Not on file    Stress: Not on file   Relationships    Social connections:     Talks on phone: Not on file Gets together: Not on file     Attends Taoism service: Not on file     Active member of club or organization: Not on file     Attends meetings of clubs or organizations: Not on file     Relationship status: Not on file    Intimate partner violence:     Fear of current or ex partner: Not on file     Emotionally abused: Not on file     Physically abused: Not on file     Forced sexual activity: Not on file   Other Topics Concern     Service Not Asked    Blood Transfusions Not Asked    Caffeine Concern Not Asked    Occupational Exposure Not Asked   [employer]ah[employer] Hazards Not Asked    Sleep Concern Not Asked    Stress Concern Not Asked    Weight Concern Not Asked    Special Diet Not Asked    Back Care Not Asked    Exercise Yes     Comment: walking 1/2 - 1 mile daily    Bike Helmet Not Asked    Seat Belt Not Asked    Self-Exams Not Asked   Social History Narrative    . Adopted by great aunt and uncle. Retired from banking industry. Two adult sons, one currently at home with her with his wife and grandchild.          Orders Only on 08/01/2019   Component Date Value Ref Range Status    Glucose 08/01/2019 84  65 - 99 mg/dL Final    BUN 08/01/2019 13  8 - 27 mg/dL Final    Creatinine 08/01/2019 0.68  0.57 - 1.00 mg/dL Final    GFR est non-AA 08/01/2019 88  >59 mL/min/1.73 Final    GFR est AA 08/01/2019 102  >59 mL/min/1.73 Final    BUN/Creatinine ratio 08/01/2019 19  12 - 28 Final    Sodium 08/01/2019 136  134 - 144 mmol/L Final    Potassium 08/01/2019 5.0  3.5 - 5.2 mmol/L Final    Chloride 08/01/2019 98  96 - 106 mmol/L Final    CO2 08/01/2019 23  20 - 29 mmol/L Final    Calcium 08/01/2019 9.6  8.7 - 10.3 mg/dL Final    Protein, total 08/01/2019 7.2  6.0 - 8.5 g/dL Final    Albumin 08/01/2019 4.1  3.5 - 4.8 g/dL Final    GLOBULIN, TOTAL 08/01/2019 3.1  1.5 - 4.5 g/dL Final    A-G Ratio 08/01/2019 1.3  1.2 - 2.2 Final    Bilirubin, total 08/01/2019 0.4  0.0 - 1.2 mg/dL Final  Alk. phosphatase 08/01/2019 66  39 - 117 IU/L Final    AST (SGOT) 08/01/2019 24  0 - 40 IU/L Final    ALT (SGPT) 08/01/2019 18  0 - 32 IU/L Final    Cholesterol, total 08/01/2019 227* 100 - 199 mg/dL Final    Triglyceride 08/01/2019 161* 0 - 149 mg/dL Final    HDL Cholesterol 08/01/2019 91  >39 mg/dL Final    VLDL, calculated 08/01/2019 32  5 - 40 mg/dL Final    LDL, calculated 08/01/2019 104* 0 - 99 mg/dL Final    TSH 08/01/2019 1.200  0.450 - 4.500 uIU/mL Final    WBC 08/01/2019 8.3  3.4 - 10.8 x10E3/uL Final    RBC 08/01/2019 4.53  3.77 - 5.28 x10E6/uL Final    HGB 08/01/2019 14.1  11.1 - 15.9 g/dL Final    HCT 08/01/2019 42.4  34.0 - 46.6 % Final    MCV 08/01/2019 94  79 - 97 fL Final    MCH 08/01/2019 31.1  26.6 - 33.0 pg Final    MCHC 08/01/2019 33.3  31.5 - 35.7 g/dL Final    RDW 08/01/2019 13.9  12.3 - 15.4 % Final    PLATELET 28/80/6530 873  150 - 450 x10E3/uL Final   Office Visit on 07/23/2019   Component Date Value Ref Range Status    Cancer Ag (CA) 125 08/01/2019 9.1  0.0 - 38.1 U/mL Final       Review of Systems   Constitutional: Negative for malaise/fatigue. Eyes: Negative for blurred vision. Respiratory: Negative for shortness of breath. Cardiovascular: Positive for leg swelling. Negative for chest pain. Gastrointestinal: Negative for constipation and heartburn. Genitourinary: Negative. Musculoskeletal: Negative. Neurological: Positive for tingling (leg twitching). Negative for dizziness and headaches. Psychiatric/Behavioral: Positive for depression. Negative for substance abuse. The patient is nervous/anxious and has insomnia. Visit Vitals  /82 (BP 1 Location: Left arm, BP Patient Position: Sitting)   Pulse 61   Temp 98.1 °F (36.7 °C) (Oral)   Resp 16   Ht 5' 5\" (1.651 m)   Wt 167 lb (75.8 kg)   SpO2 98%   BMI 27.79 kg/m²       Physical Exam   Constitutional: She is oriented to person, place, and time. She appears well-developed and well-nourished. No distress. HENT:   Head: Normocephalic and atraumatic. Right Ear: Tympanic membrane, external ear and ear canal normal.   Left Ear: Tympanic membrane, external ear and ear canal normal.   Nose: Right sinus exhibits maxillary sinus tenderness. Left sinus exhibits maxillary sinus tenderness. +R ear canal with erythema   Neck: Normal range of motion. Neck supple. No thyromegaly present. Cardiovascular: Normal rate, regular rhythm, normal heart sounds and intact distal pulses. Exam reveals no gallop and no friction rub. No murmur heard. Pulses:       Dorsalis pedis pulses are 2+ on the right side, and 2+ on the left side. Pulmonary/Chest: Effort normal and breath sounds normal. No respiratory distress. She has no wheezes. She has no rales. She exhibits no tenderness. Abdominal: Soft. Bowel sounds are normal. She exhibits no distension. There is no tenderness. Musculoskeletal: Normal range of motion. She exhibits edema. +BL knee with crepitus   Lymphadenopathy:     She has no cervical adenopathy. Neurological: She is alert and oriented to person, place, and time. Reflex Scores:       Patellar reflexes are 2+ on the right side and 2+ on the left side. Skin: She is not diaphoretic. Psychiatric: She has a normal mood and affect. Her behavior is normal. Judgment and thought content normal.   Nursing note and vitals reviewed. ASSESSMENT and PLAN    ICD-10-CM ICD-9-CM    1. Physical exam Z00.00 V70.9 Complete physical exam done. Labs reviewed with pt.   2. Essential hypertension I10 401.9 Micardis  80 mg prescribed. Potential side effects were discussed. Pt should take 1 tab po daily. 3. Mixed hyperlipidemia E78.2 272.2 pravastatin (PRAVACHOL) 40 mg tablet sent to pharmacy. Increased Pravastatin 20 mg to Pravastatin 40 mg. Pt should take 2 tabs Pravastatin 20 mg daily. 4. Anxiety and depression F41.9 300.00 escitalopram oxalate (LEXAPRO) 20 mg tablet sent to pharmacy.     F32.9 311 telmisartan (MICARDIS) 80 mg tablet sent to pharmacy. Lexopro 20 mg prescribed. Increasing the dose of Lexapro 10 mg to 20 mg. Pt should take 1.5 tab of 10 mg po x 1 month. If she still does not feel like it has improved, she should return to see me. At that time I will prescribe Buspar. 5. Need for shingles vaccine Z23 V04.89 varicella-zoster recombinant, PF, (SHINGRIX, PF,) 50 mcg/0.5 mL susr injection sent to pharmacy. Shingrix prescribed. This plan was reviewed with the patient and patient agrees. All questions were answered. This scribe documentation was reviewed by me and accurately reflects the examination and decisions made by me. This note will not be viewable in [address]

## 2019-08-15 NOTE — TELEPHONE ENCOUNTER
Called and spoke with patient's  and Auth PH[employer] who states [facility] had called stating they had new RX for patient, he states they don't use [facility], advised that the medications were sent to the [facility], he then requests they be re-sent for 90 day supply. Caller includes that there is a nationwide back-order of Shingles Vaccine per pharmacist. Jj Zachariah would be re-sent for 90 day supply. End of encounter.

## 2019-08-15 NOTE — PROGRESS NOTES
Chief Complaint   Patient presents with    Complete Physical         1. Have you been to the ER, urgent care clinic since your last visit? Hospitalized since your last visit? no    2. Have you seen or consulted any other health care providers outside of the [address] [place] [address] since your last visit? Include any pap smears or colon screening.   no

## 2019-08-15 NOTE — TELEPHONE ENCOUNTER
----- Message from Randa Asencio sent at 8/15/2019 11:58 AM EDT -----  Regarding: DR MYERS / TELEPHONE  General Message/Vendor Calls      RENU GAMEZ [facility]) reports that the 3 medications called in this morning were called into the incorrect Pharmacy.      Please call into: [address] [facility] Floor   [phone]        Callback required     Best contact number(s):  [phone]          Randa Asencio

## 2019-10-08 ENCOUNTER — OFFICE VISIT (OUTPATIENT)
Dept: PRIMARY CARE CLINIC | Age: 74
End: 2019-10-08

## 2019-10-08 VITALS
TEMPERATURE: 98 F | HEART RATE: 63 BPM | RESPIRATION RATE: 16 BRPM | HEIGHT: 65 IN | OXYGEN SATURATION: 96 % | DIASTOLIC BLOOD PRESSURE: 82 MMHG | WEIGHT: 170 LBS | BODY MASS INDEX: 28.32 KG/M2 | SYSTOLIC BLOOD PRESSURE: 150 MMHG

## 2019-10-08 DIAGNOSIS — F32.A ANXIETY AND DEPRESSION: ICD-10-CM

## 2019-10-08 DIAGNOSIS — M79.89 LEG SWELLING: ICD-10-CM

## 2019-10-08 DIAGNOSIS — F41.9 ANXIETY AND DEPRESSION: ICD-10-CM

## 2019-10-08 DIAGNOSIS — I10 WHITE COAT SYNDROME WITH DIAGNOSIS OF HYPERTENSION: Primary | ICD-10-CM

## 2019-10-08 RX ORDER — INDAPAMIDE 1.25 MG/1
1.25 TABLET, FILM COATED ORAL DAILY
Qty: 30 TAB | Refills: 2 | Status: SHIPPED | OUTPATIENT
Start: 2019-10-08 | End: 2019-10-30 | Stop reason: SDUPTHER

## 2019-10-08 NOTE — PROGRESS NOTES
Visit Vitals  /80 (BP 1 Location: Left arm, BP Patient Position: Sitting)   Pulse 63   Temp 98 °F (36.7 °C) (Oral)   Resp 16   Ht 5' 5\" (1.651 m)   Wt 170 lb (77.1 kg)   SpO2 96%   BMI 28.29 kg/m²           Chief Complaint   Patient presents with    Follow Up Chronic Condition     HTN, Depression    Medication Evaluation     Increased Escitalopram and Micardis          Hm Due reviewed and WNL. 1. Have you been to the ER, urgent care clinic since your last visit? Hospitalized since your last visit? Denies     2. Have you seen or consulted any other health care providers outside of the [address] [place] [address] since your last visit? Include any pap smears or colon screening.  Denies

## 2019-10-08 NOTE — PROGRESS NOTES
Written by Mellissa Skiff, as dictated by Dr. Panda Corley MD.    Roscoe Joyner is a 72 y.o. female. HPI  The patient presents today for follow-up. Patient is accompanied by her . BP is high today at 182/80, 150/82 on repeat. She believes it may be due to white-coat syndrome. Compliant on Micardis 80 mg. She notes before her decaf coffee this morning, her BP was 131/79 on her Omeron machine at home. She was previously told by Dr. Ted Correia that she had leg swelling in the past, and was prescribed Telmisartan-HCTZ, but she didn't do well on it. She was switched back to only Telmisartan. She admits she has not reduced her salt intake. Denies headaches. She has been walking more since her last visit. She has gained weight. She weighs 170 lbs today and weighed 08/15/19. She has been walking more often now. She admits she craves sugar, but has reduced 1-2 glass of wine a week. She admits being someone who hold onto things, especially with the stressful situation at home, and waiting on her  getting a job. She is taking Lexapro 20 mg nightly, which has helped her sleep much better, and is feeling more well-rested in the mornings. Things with her son are still the same, as the daughter-in-law will not sign the paperwork, and her grandson is seeing a counselor. Patient Active Problem List   Diagnosis Code    History of malignant neoplasm of ovary Z85.43    Ovarian cancer ([facility] [address] C56.9    Essential hypertension I10    Mixed hyperlipidemia E78.2        Current Outpatient Medications on File Prior to Visit   Medication Sig Dispense Refill    escitalopram oxalate (LEXAPRO) 20 mg tablet Take 1 Tab by mouth daily for 90 days. 90 Tab 0    pravastatin (PRAVACHOL) 40 mg tablet Take 1 Tab by mouth nightly for 90 days. 90 Tab 0    telmisartan (MICARDIS) 80 mg tablet Take 1 Tab by mouth daily for 90 days.  90 Tab 0    estradiol (ESTRACE) 1 mg tablet TAKE 1 TAB BY MOUTH DAILY. 90 Tab 2    FOLIC ACID/MULTIVIT-MIN/LUTEIN (CENTRUM SILVER PO) Take  by mouth. Takes one po once daily.  coenzyme q10 (CO Q-10) 300 mg cap Take 300 mg by mouth daily.  DOCOSAHEXANOIC ACID/EPA (FISH OIL PO) Take 1,200 mg by mouth daily.  estradiol (ESTRACE) 0.01 % (0.1 mg/gram) vaginal cream Insert 1 g into vagina two (2) times a week. SUN and WED      ascorbic acid (VITAMIN C) 500 mg tablet Take 250 mg by mouth daily. No current facility-administered medications on file prior to visit. Allergies   Allergen Reactions    Simvastatin Myalgia    Codeine Other (comments)     Unable to function    Lidocaine Hives       Past Medical History:   Diagnosis Date    Adopted     Adverse effect of anesthesia     tremors and shaking after anesthesia/\"taks silly\"    BRCA negative 5/2013    Dr. [facility] Cervical arthritis     History of ovarian cancer 2013    surgery/chemotherapy    Hypercholesterolemia     Hypertension     Ill-defined condition     hx passing out from dehydration    Other anxiety states     Other ill-defined conditions(799.89) 2010    BOWEL OBSTRUCTION  ? IMPACTION    Unspecified adverse effect of anesthesia     TREMORS AFTERWARDS       Past Surgical History:   Procedure Laterality Date    COLONOSCOPY N/A 8/2/2017    [facility]COPY    HX CHOLECYSTECTOMY  2000    HX DILATION AND CURETTAGE      HX GYN  1/11/13    RSO    HX GYN  2/13/13    TLH/LSO, omentectomy, lymph node dissection    HX TONSILLECTOMY      AS CHILD    HX TUBAL LIGATION  1978    HX VASCULAR ACCESS      placed and removed    HX WISDOM TEETH EXTRACTION         Family History   Problem Relation Age of Onset   [place] Mons Stroke Father     Hypertension Father     Parkinsonism Mother     Hypertension Mother     Dementia Mother     Diabetes Other         Maternal great aunt    Heart Attack Other         maternal great aunt    Heart Disease Other maternal great aunt    Drug Abuse Son         opioid       Social History     Socioeconomic History    Marital status:      Spouse name: Not on file    Number of children: Not on file    Years of education: Not on file    Highest education level: Not on file   Occupational History    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   Social Needs    Financial resource strain: Not on file    Food insecurity:     Worry: Not on file     Inability: Not on file    Transportation needs:     Medical: Not on file     Non-medical: Not on file   Tobacco Use    Smoking status: Never Smoker    Smokeless tobacco: Never Used   Substance and Sexual Activity    Alcohol use:  Yes     Alcohol/week: 10.0 standard drinks     Types: 10 Glasses of wine per week     Comment: 1-2 glasses nightly    Drug use: No    Sexual activity: Yes     Partners: Male   Lifestyle    Physical activity:     Days per week: Not on file     Minutes per session: Not on file    Stress: Not on file   Relationships    Social connections:     Talks on phone: Not on file     Gets together: Not on file     Attends Orthodox service: Not on file     Active member of club or organization: Not on file     Attends meetings of clubs or organizations: Not on file     Relationship status: Not on file    Intimate partner violence:     Fear of current or ex partner: Not on file     Emotionally abused: Not on file     Physically abused: Not on file     Forced sexual activity: Not on file   Other Topics Concern     Service Not Asked    Blood Transfusions Not Asked    Caffeine Concern Not Asked    Occupational Exposure Not Asked   [place] Hazards Not Asked    Sleep Concern Not Asked    Stress Concern Not Asked    Weight Concern Not Asked    Special Diet Not Asked    Back Care Not Asked    Exercise Yes     Comment: walking 1/2 - 1 mile daily    Bike Helmet Not Asked    Seat Belt Not Asked    Self-Exams Not Asked   Social History Narrative . Adopted by great aunt and uncle. Retired from banking industry. Two adult sons, one currently at home with her with his wife and grandchild. Orders Only on 08/01/2019   Component Date Value Ref Range Status    Glucose 08/01/2019 84  65 - 99 mg/dL Final    BUN 08/01/2019 13  8 - 27 mg/dL Final    Creatinine 08/01/2019 0.68  0.57 - 1.00 mg/dL Final    GFR est non-AA 08/01/2019 88  >59 mL/min/1.73 Final    GFR est AA 08/01/2019 102  >59 mL/min/1.73 Final    BUN/Creatinine ratio 08/01/2019 19  12 - 28 Final    Sodium 08/01/2019 136  134 - 144 mmol/L Final    Potassium 08/01/2019 5.0  3.5 - 5.2 mmol/L Final    Chloride 08/01/2019 98  96 - 106 mmol/L Final    CO2 08/01/2019 23  20 - 29 mmol/L Final    Calcium 08/01/2019 9.6  8.7 - 10.3 mg/dL Final    Protein, total 08/01/2019 7.2  6.0 - 8.5 g/dL Final    Albumin 08/01/2019 4.1  3.5 - 4.8 g/dL Final    GLOBULIN, TOTAL 08/01/2019 3.1  1.5 - 4.5 g/dL Final    A-G Ratio 08/01/2019 1.3  1.2 - 2.2 Final    Bilirubin, total 08/01/2019 0.4  0.0 - 1.2 mg/dL Final    Alk.  phosphatase 08/01/2019 66  39 - 117 IU/L Final    AST (SGOT) 08/01/2019 24  0 - 40 IU/L Final    ALT (SGPT) 08/01/2019 18  0 - 32 IU/L Final    Cholesterol, total 08/01/2019 227* 100 - 199 mg/dL Final    Triglyceride 08/01/2019 161* 0 - 149 mg/dL Final    HDL Cholesterol 08/01/2019 91  >39 mg/dL Final    VLDL, calculated 08/01/2019 32  5 - 40 mg/dL Final    LDL, calculated 08/01/2019 104* 0 - 99 mg/dL Final    TSH 08/01/2019 1.200  0.450 - 4.500 uIU/mL Final    WBC 08/01/2019 8.3  3.4 - 10.8 x10E3/uL Final    RBC 08/01/2019 4.53  3.77 - 5.28 x10E6/uL Final    HGB 08/01/2019 14.1  11.1 - 15.9 g/dL Final    HCT 08/01/2019 42.4  34.0 - 46.6 % Final    MCV 08/01/2019 94  79 - 97 fL Final    MCH 08/01/2019 31.1  26.6 - 33.0 pg Final    MCHC 08/01/2019 33.3  31.5 - 35.7 g/dL Final    RDW 08/01/2019 13.9  12.3 - 15.4 % Final    PLATELET 22/37/9018 169  150 - 450 x10E3/uL Final   Office Visit on 07/23/2019   Component Date Value Ref Range Status    Cancer Ag (CA) 125 08/01/2019 9.1  0.0 - 38.1 U/mL Final       Review of Systems   Respiratory: Negative for shortness of breath. Musculoskeletal: Negative for joint pain and myalgias. Neurological: Negative for dizziness and headaches. Psychiatric/Behavioral: Positive for depression (improved with Lexapro). Negative for substance abuse. The patient is nervous/anxious (improved with Lexapro) and has insomnia (improved with Lexapro). Visit Vitals  /82 (BP 1 Location: Left arm, BP Patient Position: Sitting)   Pulse 63   Temp 98 °F (36.7 °C) (Oral)   Resp 16   Ht 5' 5\" (1.651 m)   Wt 170 lb (77.1 kg)   SpO2 96%   BMI 28.29 kg/m²       Physical Exam   Constitutional: She is oriented to person, place, and time. She appears well-developed and well-nourished. No distress. HENT:   Head: Normocephalic and atraumatic. Right Ear: External ear normal.   Left Ear: External ear normal.   Eyes: Pupils are equal, round, and reactive to light. Conjunctivae and EOM are normal. Right eye exhibits no discharge. Left eye exhibits no discharge. Neck: Normal range of motion. Neck supple. Cardiovascular: Normal rate, regular rhythm and normal heart sounds. Exam reveals no gallop and no friction rub. No murmur heard. + trace edema   Pulmonary/Chest: Effort normal and breath sounds normal. She has no wheezes. Abdominal: Soft. Bowel sounds are normal. There is no tenderness. Musculoskeletal: Normal range of motion. Neurological: She is alert and oriented to person, place, and time. She has normal reflexes. Skin: She is not diaphoretic. Psychiatric: She has a normal mood and affect. Her behavior is normal. Thought content normal.   Nursing note and vitals reviewed. ASSESSMENT and PLAN    ICD-10-CM ICD-9-CM    1.  White coat syndrome with diagnosis of hypertension I10 401.9 Pt should continue to take her BP at home and send me the readings. Pt reports BP at home has been within normal limits. 2. Anxiety and depression F41.9 300.00 Compliant on Lexapro. If weight keeps increasing, may discuss changing from Lexapro to another medication to help with weight loss. F32.9 311   Weight gain may also be related to the stressors in her family. 3. Leg swelling M79.89 729.81 indapamide (LOZOL) 1.25 mg tablet sent to pharmacy. Indapamide 1.25 mg prescribed. Potential side effects were discussed. Pt should take 1 tab daily. If pt is still struggling with weight loss, will consider changing to a different medication to help with weight loss. If not, then pt should let me know, and may prescribe 6 month refills in 12/2019, as insurance will be changing. This plan was reviewed with the patient and patient agrees. All questions were answered. This scribe documentation was reviewed by me and accurately reflects the examination and decisions made by me. This note will not be viewable in [address]

## 2019-10-15 RX ORDER — ESTRADIOL 1 MG/1
TABLET ORAL
Qty: 90 TAB | Refills: 2 | Status: SHIPPED | OUTPATIENT
Start: 2019-10-15 | End: 2020-06-02

## 2019-10-30 DIAGNOSIS — M79.89 LEG SWELLING: ICD-10-CM

## 2019-10-30 RX ORDER — INDAPAMIDE 1.25 MG/1
TABLET, FILM COATED ORAL
Qty: 30 TAB | Refills: 2 | Status: SHIPPED | OUTPATIENT
Start: 2019-10-30 | End: 2020-07-16

## 2019-11-04 ENCOUNTER — HOSPITAL ENCOUNTER (OUTPATIENT)
Dept: MAMMOGRAPHY | Age: 74
Discharge: HOME | End: 2019-11-04
Attending: OBSTETRICS & GYNECOLOGY
Payer: COMMERCIAL

## 2019-11-04 DIAGNOSIS — Z12.31 VISIT FOR SCREENING MAMMOGRAM: ICD-10-CM

## 2019-11-04 PROCEDURE — 77063 BREAST TOMOSYNTHESIS BI: CPT

## 2019-11-07 DIAGNOSIS — E78.2 MIXED HYPERLIPIDEMIA: ICD-10-CM

## 2019-11-07 DIAGNOSIS — F32.A ANXIETY AND DEPRESSION: ICD-10-CM

## 2019-11-07 DIAGNOSIS — F41.9 ANXIETY AND DEPRESSION: ICD-10-CM

## 2019-11-07 RX ORDER — PRAVASTATIN SODIUM 40 MG/1
40 TABLET ORAL
Qty: 90 TAB | Refills: 0 | Status: SHIPPED | OUTPATIENT
Start: 2019-11-07 | End: 2020-02-05

## 2019-11-07 RX ORDER — TELMISARTAN 80 MG/1
80 TABLET ORAL DAILY
Qty: 90 TAB | Refills: 0 | Status: SHIPPED | OUTPATIENT
Start: 2019-11-07 | End: 2020-01-10

## 2020-01-10 DIAGNOSIS — I10 ESSENTIAL HYPERTENSION: Primary | ICD-10-CM

## 2020-01-10 RX ORDER — OLMESARTAN MEDOXOMIL 40 MG/1
40 TABLET ORAL DAILY
Qty: 90 TAB | Refills: 0 | Status: SHIPPED | OUTPATIENT
Start: 2020-01-10 | End: 2020-01-12 | Stop reason: SDUPTHER

## 2020-01-12 DIAGNOSIS — I10 ESSENTIAL HYPERTENSION: ICD-10-CM

## 2020-01-12 RX ORDER — OLMESARTAN MEDOXOMIL 40 MG/1
40 TABLET ORAL DAILY
Qty: 90 TAB | Refills: 0 | Status: SHIPPED | OUTPATIENT
Start: 2020-01-12 | End: 2020-04-11

## 2020-01-13 RX ORDER — ESCITALOPRAM OXALATE 20 MG/1
20 TABLET ORAL DAILY
Qty: 90 TAB | Refills: 1 | Status: SHIPPED | OUTPATIENT
Start: 2020-01-13 | End: 2020-07-28

## 2020-06-02 RX ORDER — ESTRADIOL 1 MG/1
TABLET ORAL
Qty: 90 TAB | Refills: 2 | Status: SHIPPED | OUTPATIENT
Start: 2020-06-02 | End: 2021-03-02

## 2020-07-16 ENCOUNTER — OFFICE VISIT (OUTPATIENT)
Dept: INTERNAL MEDICINE CLINIC | Age: 75
End: 2020-07-16

## 2020-07-16 ENCOUNTER — HOSPITAL ENCOUNTER (OUTPATIENT)
Dept: LAB | Age: 75
Discharge: HOME | End: 2020-07-16
Payer: MEDICARE

## 2020-07-16 VITALS
WEIGHT: 170 LBS | OXYGEN SATURATION: 96 % | BODY MASS INDEX: 28.32 KG/M2 | SYSTOLIC BLOOD PRESSURE: 138 MMHG | HEIGHT: 65 IN | TEMPERATURE: 97.6 F | DIASTOLIC BLOOD PRESSURE: 72 MMHG | HEART RATE: 70 BPM | RESPIRATION RATE: 18 BRPM

## 2020-07-16 DIAGNOSIS — F41.9 ANXIETY: ICD-10-CM

## 2020-07-16 DIAGNOSIS — E78.2 HYPERCHOLESTEROLEMIA WITH HYPERTRIGLYCERIDEMIA: ICD-10-CM

## 2020-07-16 DIAGNOSIS — C56.1 MALIGNANT NEOPLASM OF RIGHT OVARY (HCC): ICD-10-CM

## 2020-07-16 DIAGNOSIS — I10 ESSENTIAL HYPERTENSION: Primary | ICD-10-CM

## 2020-07-16 PROCEDURE — 84443 ASSAY THYROID STIM HORMONE: CPT

## 2020-07-16 PROCEDURE — 80061 LIPID PANEL: CPT

## 2020-07-16 PROCEDURE — 85025 COMPLETE CBC W/AUTO DIFF WBC: CPT

## 2020-07-16 PROCEDURE — 80053 COMPREHEN METABOLIC PANEL: CPT

## 2020-07-16 RX ORDER — OLMESARTAN MEDOXOMIL 40 MG/1
40 TABLET ORAL DAILY
COMMUNITY
End: 2020-08-22

## 2020-07-16 RX ORDER — PRAVASTATIN SODIUM 20 MG/1
20 TABLET ORAL
COMMUNITY
End: 2020-07-16 | Stop reason: SDUPTHER

## 2020-07-16 RX ORDER — PRAVASTATIN SODIUM 20 MG/1
20 TABLET ORAL
Qty: 30 TAB | Refills: 0 | Status: SHIPPED | OUTPATIENT
Start: 2020-07-16 | End: 2020-10-14 | Stop reason: SDUPTHER

## 2020-07-16 RX ORDER — PRAVASTATIN SODIUM 20 MG/1
20 TABLET ORAL
Qty: 90 TAB | Refills: 0 | Status: SHIPPED | OUTPATIENT
Start: 2020-07-16 | End: 2020-07-16 | Stop reason: SDUPTHER

## 2020-07-16 NOTE — PATIENT INSTRUCTIONS
Hip Arthritis: Exercises  Introduction  Here are some examples of exercises for you to try. The exercises may be suggested for a condition or for rehabilitation. Start each exercise slowly. Ease off the exercises if you start to have pain. You will be told when to start these exercises and which ones will work best for you. How to do the exercises  Straight-leg raises to the outside   1. Lie on your side, with your affected hip on top. 2. Tighten the front thigh muscles of your top leg to keep your knee straight. 3. Keep your hip and your leg straight in line with the rest of your body, and keep your knee pointing forward. Do not drop your hip back. 4. Lift your top leg straight up toward the ceiling, about 12 inches off the floor. Hold for about 6 seconds, then slowly lower your leg. 5. Repeat 8 to 12 times. 6. Switch legs and repeat steps 1 through 5, even if only one hip is sore. Straight-leg raises to the inside   1. Lie on your side with your affected hip on the floor. 2. You can either prop up your other leg on a chair, or you can bend that knee and put that foot in front of your other knee. Do not drop your hip back. 3. Tighten the muscles on the front thigh of your bottom leg to straighten that knee. 4. Keep your kneecap pointing forward and your leg straight, and lift your bottom leg up toward the ceiling about 6 inches. Hold for about 6 seconds, then lower slowly. 5. Repeat 8 to 12 times. 6. Switch legs and repeat steps 1 through 5, even if only one hip is sore. Hip hike   1. Stand sideways on the bottom step of a staircase, and hold on to the banister or wall. 2. Keeping both knees straight, lift your good leg off the step and let it hang down. Then hike your good hip up to the same level as your affected hip or a little higher. 3. Repeat 8 to 12 times. 4. Switch legs and repeat steps 1 through 3, even if only one hip is sore. Bridging   1.  Lie on your back with both knees bent. Your knees should be bent about 90 degrees. 2. Then push your feet into the floor, squeeze your buttocks, and lift your hips off the floor until your shoulders, hips, and knees are all in a straight line. 3. Hold for about 6 seconds as you continue to breathe normally, and then slowly lower your hips back down to the floor and rest for up to 10 seconds. 4. Repeat 8 to 12 times. Hamstring stretch (lying down)   1. Lie flat on your back with your legs straight. If you feel discomfort in your back, place a small towel roll under your lower back. 2. Holding the back of your affected leg, lift your leg straight up and toward your body until you feel a stretch at the back of your thigh. 3. Hold the stretch for at least 30 seconds. 4. Repeat 2 to 4 times. 5. Switch legs and repeat steps 1 through 4, even if only one hip is sore. Standing quadriceps stretch   1. If you are not steady on your feet, hold on to a chair, counter, or wall. You can also lie on your stomach or your side to do this exercise. 2. Bend the knee of the leg you want to stretch, and reach behind you to grab the front of your foot or ankle with the hand on the same side. For example, if you are stretching your right leg, use your right hand. 3. Keeping your knees next to each other, pull your foot toward your buttock until you feel a gentle stretch across the front of your hip and down the front of your thigh. Your knee should be pointed directly to the ground, and not out to the side. 4. Hold the stretch for at least 15 to 30 seconds. 5. Repeat 2 to 4 times. 6. Switch legs and repeat steps 1 through 5, even if only one hip is sore. Hip rotator stretch   1. Lie on your back with both knees bent and your feet flat on the floor. 2. Put the ankle of your affected leg on your opposite thigh near your knee. 3. Use your hand to gently push your knee away from your body until you feel a gentle stretch around your hip.   4. Hold the stretch for 15 to 30 seconds. 5. Repeat 2 to 4 times. 6. Repeat steps 1 through 5, but this time use your hand to gently pull your knee toward your opposite shoulder. 7. Switch legs and repeat steps 1 through 6, even if only one hip is sore. Knee-to-chest   1. Lie on your back with your knees bent and your feet flat on the floor. 2. Bring your affected leg to your chest, keeping the other foot flat on the floor (or keeping the other leg straight, whichever feels better on your lower back). 3. Keep your lower back pressed to the floor. Hold for at least 15 to 30 seconds. 4. Relax, and lower the knee to the starting position. 5. Repeat 2 to 4 times. 6. Switch legs and repeat steps 1 through 5, even if only one hip is sore. 7. To get more stretch, put your other leg flat on the floor while pulling your knee to your chest.    Clamshell   1. Lie on your side, with your affected hip on top. Keep your feet and knees together and your knees bent. 2. Raise your top knee, but keep your feet together. Do not let your hips roll back. Your legs should open up like a clamshell. 3. Hold for 6 seconds. 4. Slowly lower your knee back down. Rest for 10 seconds. 5. Repeat 8 to 12 times. 6. Switch legs and repeat steps 1 through 5, even if only one hip is sore. Follow-up care is a key part of your treatment and safety. Be sure to make and go to all appointments, and call your doctor if you are having problems. It's also a good idea to know your test results and keep a list of the medicines you take. Where can you learn more? Go to http://iglesia-khoi.info/  Enter C008 in the search box to learn more about \"Hip Arthritis: Exercises. \"  Current as of: March 2, 2020               Content Version: 12.5  © 5612-3130 [employer], [employer]. Care instructions adapted under license by [employer] (which disclaims liability or warranty for this information).  If you have questions about a medical condition or this instruction, always ask your healthcare professional. [place] [address] any warranty or liability for your use of this information.

## 2020-07-16 NOTE — PROGRESS NOTES
HPI:  Ivette Chavira is a 69y.o. year old female who is a new patient and is here to establish care. She was previous followed by Dr Shayla Berg. The following sections were reviewed & updated as appropriate: PMH, PL, PSH, and SH. Will request records. Active medical concerns are as follows:    1. Hypercholesterolemia: Her pravastatin has been increased by her prior doctor to 40mg caused muscle aches so she is now taking pravastatin 20 mg daily and tolerating it without muscle aches. She previously had muscle aches also on simvastatin. She reports. Eating healthy exercise walking 1-2 miles daily. 2.  Hip pain bilaterally and base of thumbs and comes and goes. 3.  Hypertension BP rangin/70's. Avoiding salt. Taking meds with no side effects. 4.  Anxiety: taking lexapro 20 mg now and was. Has increased stressors. 5.  History of ovarian cancer and currently on HRT. She is followed by gyn Dr Abel Adkins gyn oncology and has hx of ovarian cancer and currently on estrace. Has tried to taper off but developed had joint pain and anxiety off of med. Current Outpatient Medications   Medication Sig Dispense Refill    pravastatin (PRAVACHOL) 20 mg tablet Take 20 mg by mouth nightly.  olmesartan (BENICAR) 40 mg tablet Take 40 mg by mouth daily.  estradioL (ESTRACE) 1 mg tablet TAKE 1 TABLET BY MOUTH EVERY DAY 90 Tab 2    escitalopram oxalate (LEXAPRO) 20 mg tablet Take 1 Tab by mouth daily. 90 Tab 1    FOLIC ACID/MULTIVIT-MIN/LUTEIN (CENTRUM SILVER PO) Take  by mouth. Takes one po once daily.  coenzyme q10 (CO Q-10) 300 mg cap Take 300 mg by mouth daily.  DOCOSAHEXANOIC ACID/EPA (FISH OIL PO) Take 1,200 mg by mouth daily.  estradiol (ESTRACE) 0.01 % (0.1 mg/gram) vaginal cream Insert 1 g into vagina two (2) times a week. SUN and WED      ascorbic acid (VITAMIN C) 500 mg tablet Take 250 mg by mouth daily.       indapamide (LOZOL) 1.25 mg tablet TAKE 1 TABLET BY MOUTH EVERY DAY 30 Tab 2 Allergies   Allergen Reactions    Simvastatin Myalgia    Codeine Other (comments)     Unable to function    Lidocaine Hives     Past Medical History:   Diagnosis Date    Adopted     Adverse effect of anesthesia     tremors and shaking after anesthesia/\"taks silly\"    BRCA negative 5/2013    Dr. Amezcua Parent Cervical arthritis     History of ovarian cancer 2013    surgery/chemotherapy    Hypercholesterolemia     Hypertension     Ill-defined condition     hx passing out from dehydration    Other anxiety states     Other ill-defined conditions(919.89) 2010    BOWEL OBSTRUCTION  ? IMPACTION    Unspecified adverse effect of anesthesia     TREMORS AFTERWARDS     Past Surgical History:   Procedure Laterality Date    COLONOSCOPY N/A 8/2/2017    [facility]COPY    HX CHOLECYSTECTOMY  2000    HX DILATION AND CURETTAGE      HX GYN  1/11/13    RSO    HX GYN  2/13/13    TLH/LSO, omentectomy, lymph node dissection    HX TONSILLECTOMY      AS CHILD    HX TUBAL LIGATION  1978    HX VASCULAR ACCESS      placed and removed    HX WISDOM TEETH EXTRACTION       Family History   Problem Relation Age of Onset   Angel Stroke Father     Hypertension Father    Angel Parkinsonism Mother     Hypertension Mother     Dementia Mother     Diabetes Other         Maternal great aunt    Heart Attack Other         maternal great aunt    Heart Disease Other         maternal great aunt    Drug Abuse Son         opioid     Social History     Tobacco Use    Smoking status: Never Smoker    Smokeless tobacco: Never Used   Substance Use Topics    Alcohol use: none      Review of Systems   Constitutional: Negative for chills, fever and malaise/fatigue. HENT: Negative for congestion and sore throat. Respiratory: Negative for cough, shortness of breath and wheezing. Cardiovascular: Negative for chest pain, palpitations and leg swelling.    Gastrointestinal: Negative for abdominal pain, blood in stool, constipation, diarrhea, heartburn, nausea and vomiting. Genitourinary: Negative for dysuria, frequency and urgency. Musculoskeletal: Positive for joint pain. Negative for back pain and myalgias. Skin: Negative for rash. Neurological: Negative for dizziness, sensory change, focal weakness and headaches. Psychiatric/Behavioral: Negative for depression. The patient is not nervous/anxious and does not have insomnia. Physical Exam  Vitals signs and nursing note reviewed. Constitutional:       General: She is not in acute distress. Appearance: She is well-developed. HENT:      Head: Normocephalic and atraumatic. Right Ear: Tympanic membrane and ear canal normal.      Left Ear: Tympanic membrane and ear canal normal.      Nose: Nose normal.   Eyes:      Conjunctiva/sclera: Conjunctivae normal.      Pupils: Pupils are equal, round, and reactive to light. Neck:      Musculoskeletal: Normal range of motion. Thyroid: No thyromegaly. Cardiovascular:      Rate and Rhythm: Normal rate and regular rhythm. Heart sounds: Normal heart sounds. No murmur. Pulmonary:      Effort: Pulmonary effort is normal.      Breath sounds: Normal breath sounds. Abdominal:      General: Bowel sounds are normal.      Palpations: Abdomen is soft. There is no mass. Tenderness: There is no abdominal tenderness. Musculoskeletal:      Right lower leg: No edema. Left lower leg: No edema. Lymphadenopathy:      Cervical: No cervical adenopathy. Skin:     Findings: No rash. Neurological:      General: No focal deficit present. Cranial Nerves: No cranial nerve deficit. Sensory: No sensory deficit.    Psychiatric:         Mood and Affect: Mood normal.         Behavior: Behavior normal.        Visit Vitals  /72   Pulse 70   Temp 97.6 °F (36.4 °C) (Temporal)   Resp 18   Ht 5' 4.75\" (1.645 m)   Wt 170 lb (77.1 kg)   SpO2 96%   BMI 28.51 kg/m²          Assessment & Plan:    ICD-10-CM ICD-9-CM    1. Essential hypertension   Stable and controlled by home numbers. continue current medication. Reviewed healthy lifestyle  I10 401.9    2. Hypercholesterolemia with hypertriglyceridemia   Well controlled, continue current medication. E78.2 272.2 CBC WITH AUTOMATED DIFF      METABOLIC PANEL, COMPREHENSIVE      LIPID PANEL      TSH RFX ON ABNORMAL TO FREE T4   3. Anxiety   Stable but has significant family stressors with her son and his children moving in with her. He is going through a divorce. Continue with current Lexapro and recommend she consider counseling. She wants to hold on counseling at this time. F41.9 300.00 TSH RFX ON ABNORMAL TO FREE T4   HRT: History of ovarian cancer but her GYN oncologist Dr. Bella Drummond has her on estrogen therapy. She reports that she needs estrogen therapy in order to feel good. She has not been able to taper off. Orders Placed This Encounter    CBC WITH AUTOMATED DIFF    METABOLIC PANEL, COMPREHENSIVE    LIPID PANEL    TSH RFX ON ABNORMAL TO FREE T4    olmesartan (BENICAR) 40 mg tablet    pravastatin (PRAVACHOL) 20 mg tablet    follow up 6 months       Advised her to call back or return to office if symptoms worsen/change/persist.  Discussed expected course/resolution/complications of diagnosis in detail with patient. Medication risks/benefits/costs/interactions/alternatives discussed with patient. She was given an after visit summary which includes diagnoses, current medications, & vitals. She expressed understanding with the diagnosis and plan.

## 2020-07-17 LAB
ALBUMIN SERPL-MCNC: 4.5 G/DL (ref 3.7–4.7)
ALBUMIN/GLOB SERPL: 1.6 {RATIO} (ref 1.2–2.2)
ALP SERPL-CCNC: 71 IU/L (ref 39–117)
ALT SERPL-CCNC: 17 IU/L (ref 0–32)
AST SERPL-CCNC: 23 IU/L (ref 0–40)
BASOPHILS # BLD AUTO: 0.1 X10E3/UL (ref 0–0.2)
BASOPHILS NFR BLD AUTO: 1 %
BILIRUB SERPL-MCNC: 0.5 MG/DL (ref 0–1.2)
BUN SERPL-MCNC: 12 MG/DL (ref 8–27)
BUN/CREAT SERPL: 17 (ref 12–28)
CALCIUM SERPL-MCNC: 9.9 MG/DL (ref 8.7–10.3)
CHLORIDE SERPL-SCNC: 97 MMOL/L (ref 96–106)
CHOLEST SERPL-MCNC: 253 MG/DL (ref 100–199)
CO2 SERPL-SCNC: 23 MMOL/L (ref 20–29)
CREAT SERPL-MCNC: 0.71 MG/DL (ref 0.57–1)
EOSINOPHIL # BLD AUTO: 0.2 X10E3/UL (ref 0–0.4)
EOSINOPHIL NFR BLD AUTO: 2 %
ERYTHROCYTE [DISTWIDTH] IN BLOOD BY AUTOMATED COUNT: 12.6 % (ref 11.7–15.4)
GLOBULIN SER CALC-MCNC: 2.9 G/DL (ref 1.5–4.5)
GLUCOSE SERPL-MCNC: 81 MG/DL (ref 65–99)
HCT VFR BLD AUTO: 43 % (ref 34–46.6)
HDLC SERPL-MCNC: 93 MG/DL
HGB BLD-MCNC: 14.8 G/DL (ref 11.1–15.9)
IMM GRANULOCYTES # BLD AUTO: 0 X10E3/UL (ref 0–0.1)
IMM GRANULOCYTES NFR BLD AUTO: 0 %
LDLC SERPL CALC-MCNC: 134 MG/DL (ref 0–99)
LYMPHOCYTES # BLD AUTO: 2.8 X10E3/UL (ref 0.7–3.1)
LYMPHOCYTES NFR BLD AUTO: 28 %
MCH RBC QN AUTO: 31 PG (ref 26.6–33)
MCHC RBC AUTO-ENTMCNC: 34.4 G/DL (ref 31.5–35.7)
MCV RBC AUTO: 90 FL (ref 79–97)
MONOCYTES # BLD AUTO: 0.9 X10E3/UL (ref 0.1–0.9)
MONOCYTES NFR BLD AUTO: 9 %
NEUTROPHILS # BLD AUTO: 5.8 X10E3/UL (ref 1.4–7)
NEUTROPHILS NFR BLD AUTO: 60 %
PLATELET # BLD AUTO: 392 X10E3/UL (ref 150–450)
POTASSIUM SERPL-SCNC: 4.8 MMOL/L (ref 3.5–5.2)
PROT SERPL-MCNC: 7.4 G/DL (ref 6–8.5)
RBC # BLD AUTO: 4.77 X10E6/UL (ref 3.77–5.28)
SODIUM SERPL-SCNC: 136 MMOL/L (ref 134–144)
TRIGL SERPL-MCNC: 129 MG/DL (ref 0–149)
TSH SERPL DL<=0.005 MIU/L-ACNC: 1.37 UIU/ML (ref 0.45–4.5)
VLDLC SERPL CALC-MCNC: 26 MG/DL (ref 5–40)
WBC # BLD AUTO: 9.8 X10E3/UL (ref 3.4–10.8)

## 2020-07-27 NOTE — PROGRESS NOTES
Check up for history of ovarian cancer. Pt states no abnormal spotting, bleeding or pain. 1. Have you been to the ER, urgent care clinic since your last visit? Hospitalized since your last visit?no    2. Have you seen or consulted any other health care providers outside of the [address] [place] [address] since your last visit? Include any pap smears or colon screening.  no

## 2020-07-28 ENCOUNTER — HOSPITAL ENCOUNTER (OUTPATIENT)
Dept: LAB | Age: 75
Discharge: HOME | End: 2020-07-28
Payer: MEDICARE

## 2020-07-28 ENCOUNTER — OFFICE VISIT (OUTPATIENT)
Dept: GYNECOLOGY | Age: 75
End: 2020-07-28

## 2020-07-28 VITALS
WEIGHT: 170 LBS | HEIGHT: 65 IN | BODY MASS INDEX: 28.32 KG/M2 | SYSTOLIC BLOOD PRESSURE: 167 MMHG | DIASTOLIC BLOOD PRESSURE: 95 MMHG | HEART RATE: 67 BPM

## 2020-07-28 DIAGNOSIS — C56.1 MALIGNANT NEOPLASM OF RIGHT OVARY (HCC): Primary | ICD-10-CM

## 2020-07-28 PROCEDURE — 86304 IMMUNOASSAY TUMOR CA 125: CPT

## 2020-07-28 PROCEDURE — 36415 COLL VENOUS BLD VENIPUNCTURE: CPT

## 2020-07-28 RX ORDER — ESCITALOPRAM OXALATE 10 MG/1
10 TABLET ORAL DAILY
Qty: 1 TAB | Refills: 0
Start: 2020-07-28 | End: 2020-11-02 | Stop reason: ALTCHOICE

## 2020-07-28 NOTE — PROGRESS NOTES
See my chart patient message. Patient requesting decrease in her Lexapro to 10 mg daily feeling that the 20 mg dose is higher than she needs. She will contact me if she has flare of her anxiety with decreasing the dose.

## 2020-07-28 NOTE — PROGRESS NOTES
[facility]COL[facility]  [address] [facility], [facility] [address] [facility]  [phone]      Office Visit    Patient ID:  Name: Precious Sabillon  MRN: [id number]  : 1945/74 y.o. Visit date: 2020    INTERVAL HISTORY:  Precious Sabillon is a  female with a diagnosis of stage IC, grade 3, ovarian carcinoma (clear cell). She is s/p surgical resection in 2013 and completed 6 cycles of Taxol and Carboplatin chemotherapy. She tolerated chemotherapy quite well. Her post-treatment PET/CT in 2013 showed no evidence of residual or metastatic disease. She has been followed since that time without evidence of disease. She presents today for routine surveillance. She is doing well and is without complaints. She denies any vaginal bleeding or discharge, any pelvic or abdominal pain, or any changes in her bowel or bladder habits. Her most recent CA-125 in 2019 was normal.          PMH:  Past Medical History:   Diagnosis Date    Adopted     Adverse effect of anesthesia     tremors and shaking after anesthesia/\"taks silly\"    BRCA negative 2013    Dr. Rolando Small Cervical arthritis     History of ovarian cancer     surgery/chemotherapy    Hypercholesterolemia     Hypertension     Ill-defined condition     hx passing out from dehydration    Other anxiety states     Other ill-defined conditions(799.89) 2010    BOWEL OBSTRUCTION  ? IMPACTION    Unspecified adverse effect of anesthesia     TREMORS AFTERWARDS     PSH:  Past Surgical History:   Procedure Laterality Date    COLONOSCOPY N/A 2017    COLONOSCOPY performed by Jonn Gilliam MD at 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   Social Needs    Financial resource strain: Not on file    Food insecurity     Worry: Not on file     Inability: Not on file   [employer] needs     Medical: Not on file     Non-medical: Not on file   Tobacco Use    Smoking status: Never Smoker    Smokeless tobacco: Never Used   Substance and Sexual Activity    Alcohol use: Yes     Alcohol/week: 10.0 standard drinks     Types: 10 Glasses of wine per week     Comment: 1-2 glasses nightly    Drug use: No    Sexual activity: Yes     Partners: Male   Lifestyle    Physical activity     Days per week: Not on file     Minutes per session: Not on file    Stress: Not on file   Relationships    Social connections     Talks on phone: Not on file     Gets together: Not on file     Attends Evangelical service: Not on file     Active member of club or organization: Not on file     Attends meetings of clubs or organizations: Not on file     Relationship status: Not on file    Intimate partner violence     Fear of current or ex partner: Not on file     Emotionally abused: Not on file     Physically abused: Not on file     Forced sexual activity: Not on file   Other Topics Concern     Service Not Asked    Blood Transfusions Not Asked    Caffeine Concern Not Asked    Occupational Exposure Not Asked   [place] Hazards Not Asked    Sleep Concern Not Asked    Stress Concern Not Asked    Weight Concern Not Asked    Special Diet Not Asked    Back Care Not Asked    Exercise Yes     Comment: walking 1/2 - 1 mile daily    Bike Helmet Not Asked    Seat Belt Not Asked    Self-Exams Not Asked   Social History Narrative    . Adopted by great aunt and uncle.    Retired from banking industry. Two adult sons, one currently at home with her with his wife and grandchild. Family History  Family History   Problem Relation Age of Onset   [address] [facility] Stroke Father    [address] [facility] Hypertension Father    [address] [facility] Parkinsonism Mother     Hypertension Mother     Dementia Mother     Diabetes Other         Maternal great aunt    Heart Attack Other         maternal great aunt    Heart Disease Other         maternal great aunt    Drug Abuse Son         opioid     Medications:  Current Outpatient Medications on File Prior to Visit   Medication Sig Dispense Refill    olmesartan (BENICAR) 40 mg tablet Take 40 mg by mouth daily.  pravastatin (PRAVACHOL) 20 mg tablet Take 1 Tab by mouth nightly. 30 Tab 0    estradioL (ESTRACE) 1 mg tablet TAKE 1 TABLET BY MOUTH EVERY DAY 90 Tab 2    FOLIC ACID/MULTIVIT-MIN/LUTEIN (CENTRUM SILVER PO) Take  by mouth. Takes one po once daily.  coenzyme q10 (CO Q-10) 300 mg cap Take 300 mg by mouth daily.  DOCOSAHEXANOIC ACID/EPA (FISH OIL PO) Take 1,200 mg by mouth daily.  estradiol (ESTRACE) 0.01 % (0.1 mg/gram) vaginal cream Insert 1 g into vagina two (2) times a week. SUN and WED      ascorbic acid (VITAMIN C) 500 mg tablet Take 250 mg by mouth daily. No current facility-administered medications on file prior to visit. Allergies: Allergies   Allergen Reactions    Simvastatin Myalgia    Codeine Other (comments)     Unable to function    Lidocaine Hives       ROS:  Negative except for that mentioned above.     OBJECTIVE:    PHYSICAL EXAM  VITAL SIGNS: Visit Vitals  BP (!) 167/95 (BP 1 Location: Left arm, BP Patient Position: Sitting)   Pulse 67   Ht 5' 4.76\" (1.645 m)   Wt 170 lb (77.1 kg)   BMI 28.50 kg/m²      GENERAL ZOILA: in no apparent distress and well developed and well nourished   HEENT: within normal limits   RESPIRATORY: lungs clear to auscultation and percussion   CARDIOVASC: Regular rate and rhythm without [facility]   GASTROINT: soft, non-tender, without masses or organomegaly   MUSCULOSKEL: no joint tenderness, deformity or swelling   EXTREMITIES: extremities normal, atraumatic, no cyanosis or edema   PELVIC: Normal external genitalia. Normal vagina. Uterus, cervix, and adnexa surgically absent. No masses or nodularity. RECTAL: Exam deferred. JAH SURVEY: Cervical, supraclavicular, and axillary nodes normal.   NEURO: Grossly normal       Lab Results   Component Value Date/Time    WBC 9.8 07/16/2020 12:00 AM    HGB 14.8 07/16/2020 12:00 AM    HCT 43.0 07/16/2020 12:00 AM    PLATELET 418 41/64/4765 12:00 AM    MCV 90 07/16/2020 12:00 AM     Lab Results   Component Value Date/Time    Sodium 136 07/16/2020 12:00 AM    Potassium 4.8 07/16/2020 12:00 AM    Chloride 97 07/16/2020 12:00 AM    CO2 23 07/16/2020 12:00 AM    Anion gap 11 09/15/2018 04:23 PM    Glucose 81 07/16/2020 12:00 AM    BUN 12 07/16/2020 12:00 AM    Creatinine 0.71 07/16/2020 12:00 AM    BUN/Creatinine ratio 17 07/16/2020 12:00 AM    GFR est AA 98 07/16/2020 12:00 AM    GFR est non-AA 85 07/16/2020 12:00 AM    Calcium 9.9 07/16/2020 12:00 AM       CT of abdomen/pelvis (7/16/15)  Limited images of the lung bases demonstrate a 3 mm nodule in the right    lower lobe (series 3, image 6) that is stable dating back to least a    December 2009. There is no new lung nodule, mass, or consolidation. The    heart size is normal. There is no pericardial or pleural effusion.     The liver, spleen, pancreas, and adrenal glands are normal. The kidneys are    symmetric without hydronephrosis. The gall bladder is surgically absent    without intra- or extra-hepatic biliary dilatation.        There are no dilated bowel loops.  The appendix is normal.  There are no    enlarged lymph nodes.  There is no free fluid or free air.     The urinary bladder is normal.  There is no pelvic mass.  The bony    structures are age-appropriate.  There is no aggressive blastic or lytic    osseous lesion.        IMPRESSION:    Stable exam with no evidence for disease recurrence or metastatic disease in    the abdomen or pelvis. IMPRESSION AND PLAN:  Yung Townsend has a diagnosis of stage IC, grade 3, ovarian CA (clear cell), managed with surgical resection followed by adjuvant Taxol and Carboplatin chemotherapy. She has no evidence of disease based upon today's examination. I will plan to see her back in one year for continued surveillance.         Sarah Patricio MD  7/28/2020/10:56 AM

## 2020-07-29 LAB — CANCER AG125 SERPL-ACNC: 10.8 U/ML (ref 0–38.1)

## 2020-08-06 RX ORDER — ESCITALOPRAM OXALATE 10 MG/1
10 TABLET ORAL DAILY
Qty: 90 TAB | Refills: 0 | Status: SHIPPED | OUTPATIENT
Start: 2020-08-06 | End: 2020-11-02

## 2020-10-14 ENCOUNTER — TELEPHONE (OUTPATIENT)
Dept: INTERNAL MEDICINE CLINIC | Age: 75
End: 2020-10-14

## 2020-10-14 RX ORDER — PRAVASTATIN SODIUM 20 MG/1
20 TABLET ORAL
Qty: 30 TAB | Refills: 3 | Status: SHIPPED | OUTPATIENT
Start: 2020-10-14 | End: 2020-10-14 | Stop reason: SDUPTHER

## 2020-10-14 RX ORDER — PRAVASTATIN SODIUM 10 MG/1
10 TABLET ORAL
Qty: 30 TAB | Refills: 0 | Status: SHIPPED | OUTPATIENT
Start: 2020-10-14 | End: 2020-11-04

## 2020-10-14 NOTE — TELEPHONE ENCOUNTER
Pravastatin 10 mg sent to pharmacy. Patient notified via [employer] that if she continues to have muscle aches would stop pravastatin and to contact me.

## 2020-10-14 NOTE — TELEPHONE ENCOUNTER
----- Message from Tavia Hsu LPN sent at 68/5/8476  9:56 AM EDT -----  Regarding: FW: Prescription Question  Contact: [phone]    ----- Message -----  From: Tl Curiel  Sent: 10/7/2020   3:34 PM EDT  To: [facility]  Subject: Prescription Question                            Dr. Karen Weinberg,   I am close to needing a refill on prevastatin but I would like to try 10 mgs to see if my body aches are better on a lower dose. Could  we try for a 30 day prescription? Please let me know .     Thank you,     Hung Stallings

## 2020-10-15 ENCOUNTER — TELEPHONE (OUTPATIENT)
Dept: INTERNAL MEDICINE CLINIC | Age: 75
End: 2020-10-15

## 2020-10-15 NOTE — TELEPHONE ENCOUNTER
[facility] #[id number] - [facility], [address] [facility] [address] [facility] [address] (Pharmacy) [phone]     Pharm calling to verify strength on pravastatin rx.

## 2021-02-01 RX ORDER — ESCITALOPRAM OXALATE 10 MG/1
10 TABLET ORAL DAILY
Qty: 90 TAB | Refills: 0 | Status: SHIPPED | OUTPATIENT
Start: 2021-02-01 | End: 2021-04-30

## 2021-02-09 ENCOUNTER — OFFICE VISIT (OUTPATIENT)
Dept: INTERNAL MEDICINE CLINIC | Age: 76
End: 2021-02-09
Payer: MEDICARE

## 2021-02-09 VITALS
RESPIRATION RATE: 16 BRPM | OXYGEN SATURATION: 97 % | SYSTOLIC BLOOD PRESSURE: 138 MMHG | WEIGHT: 169 LBS | BODY MASS INDEX: 28.33 KG/M2 | HEART RATE: 68 BPM | DIASTOLIC BLOOD PRESSURE: 82 MMHG | TEMPERATURE: 97.5 F

## 2021-02-09 DIAGNOSIS — I10 ESSENTIAL HYPERTENSION: ICD-10-CM

## 2021-02-09 DIAGNOSIS — M16.0 PRIMARY OSTEOARTHRITIS OF BOTH HIPS: ICD-10-CM

## 2021-02-09 DIAGNOSIS — L65.9 HAIR LOSS: ICD-10-CM

## 2021-02-09 DIAGNOSIS — E78.2 MIXED HYPERLIPIDEMIA: Primary | ICD-10-CM

## 2021-02-09 PROCEDURE — G8510 SCR DEP NEG, NO PLAN REQD: HCPCS | Performed by: INTERNAL MEDICINE

## 2021-02-09 PROCEDURE — 3017F COLORECTAL CA SCREEN DOC REV: CPT | Performed by: INTERNAL MEDICINE

## 2021-02-09 PROCEDURE — G8752 SYS BP LESS 140: HCPCS | Performed by: INTERNAL MEDICINE

## 2021-02-09 PROCEDURE — G9899 SCRN MAM PERF RSLTS DOC: HCPCS | Performed by: INTERNAL MEDICINE

## 2021-02-09 PROCEDURE — 99214 OFFICE O/P EST MOD 30 MIN: CPT | Performed by: INTERNAL MEDICINE

## 2021-02-09 PROCEDURE — G8399 PT W/DXA RESULTS DOCUMENT: HCPCS | Performed by: INTERNAL MEDICINE

## 2021-02-09 PROCEDURE — 1090F PRES/ABSN URINE INCON ASSESS: CPT | Performed by: INTERNAL MEDICINE

## 2021-02-09 PROCEDURE — G0463 HOSPITAL OUTPT CLINIC VISIT: HCPCS | Performed by: INTERNAL MEDICINE

## 2021-02-09 PROCEDURE — 1101F PT FALLS ASSESS-DOCD LE1/YR: CPT | Performed by: INTERNAL MEDICINE

## 2021-02-09 PROCEDURE — G8754 DIAS BP LESS 90: HCPCS | Performed by: INTERNAL MEDICINE

## 2021-02-09 PROCEDURE — G8427 DOCREV CUR MEDS BY ELIG CLIN: HCPCS | Performed by: INTERNAL MEDICINE

## 2021-02-09 PROCEDURE — G8419 CALC BMI OUT NRM PARAM NOF/U: HCPCS | Performed by: INTERNAL MEDICINE

## 2021-02-09 PROCEDURE — G8536 NO DOC ELDER MAL SCRN: HCPCS | Performed by: INTERNAL MEDICINE

## 2021-02-09 NOTE — PROGRESS NOTES
Austin Benton is a 70 y.o. female who was seen today for a follow-up visit. Assessment & Plan:   Diagnoses and all orders for this visit:    1. Mixed hyperlipidemia  We will check lab work. We will do a trial off of pravastatin for 2 weeks to see if this changes her hip pain. I suspect most of her hip pain is related to osteoarthritis. Counseled reguarding lifestyle changes.  -     METABOLIC PANEL, COMPREHENSIVE; Future  -     LIPID PANEL; Future    2. Essential hypertension  Borderline today. Blood pressures at home good continue current medication  3. Hair loss  Check lab work refer to dermatology if not improving  -     TSH 3RD GENERATION; Future  -     T4, FREE; Future    4. Primary osteoarthritis of both hips  Encourage exercise and Tylenol. lab results and schedule of future lab studies reviewed with patient. Follow-up 6 months  Subjective:   Austin Benton was seen for Follow-up  's/70's  1. Hypercholesterolemia:   Now on pravastatin 10 mg concerned that she has hip pain related to her pravastatin. She reports. Eating healthy exercise walking 2. Hypertension BP rangin/70's. Avoiding salt. Taking meds with no side effects. 4.  Anxiety: taking lexapro 20 mg now and doing well. Anxiety controlled. No depression or suicidal thoughts. 5.  History of ovarian cancer and currently on HRT. She is followed by gyn Dr Romel Arita gyn oncology and has hx of ovarian cancer and currently on estrace. Has tried to taper off but developed had joint pain and anxiety off of med. Review of Systems   Respiratory: Negative for shortness of breath. Cardiovascular: Negative for chest pain and leg swelling. Gastrointestinal: Negative for abdominal pain. - per HPI    Physical Exam  Vitals signs and nursing note reviewed. Constitutional:       General: She is not in acute distress. Appearance: She is well-developed. HENT:      Head: Normocephalic and atraumatic.    Cardiovascular: Rate and Rhythm: Normal rate and regular rhythm. Pulmonary:      Effort: Pulmonary effort is normal.      Breath sounds: Normal breath sounds. Abdominal:      General: Bowel sounds are normal.      Palpations: Abdomen is soft. Tenderness: There is no abdominal tenderness. Musculoskeletal:      Right hip: She exhibits decreased range of motion and tenderness (With range of motion). Left hip: She exhibits decreased range of motion and tenderness (With range of motion). Right lower leg: No edema. Left lower leg: No edema. Skin:     Comments: She has noticed some hair loss diffusely. Psychiatric:         Mood and Affect: Mood normal.       Visit Vitals  /82   Pulse 68   Temp 97.5 °F (36.4 °C) (Temporal)   Resp 16   Wt 169 lb (76.7 kg)   SpO2 97%   BMI 28.33 kg/m²        Aspects of this note may have been generated using voice recognition software. Despite editing, there may be some syntax errors   I have discussed the diagnosis with the patient and the intended plan as seen in the above orders. The patient has received an after-visit summary and questions were answered concerning future plans. I have discussed any recommended medication side effects and warnings with the patient as well.   She has expressed understanding of the diagnosis and plan    Massimo Villarreal MD

## 2021-02-09 NOTE — PROGRESS NOTES
Verified name and birth date for privacy precautions. Chart reviewed in preparation for today's visit.      Chief Complaint   Patient presents with    Annual Wellness Visit          Health Maintenance Due   Topic    COVID-19 Vaccine (1 of 2)    Medicare Yearly Exam          Wt Readings from Last 3 Encounters:   02/09/21 169 lb (76.7 kg)   07/28/20 170 lb (77.1 kg)   07/16/20 170 lb (77.1 kg)     Temp Readings from Last 3 Encounters:   02/09/21 97.5 °F (36.4 °C) (Temporal)   07/16/20 97.6 °F (36.4 °C) (Temporal)   10/08/19 98 °F (36.7 °C) (Oral)     BP Readings from Last 3 Encounters:   02/09/21 (!) 154/91   07/28/20 (!) 167/95   07/16/20 138/72     Pulse Readings from Last 3 Encounters:   02/09/21 68   07/28/20 67   07/16/20 70         Learning Assessment:  :     Learning Assessment 4/3/2019 2/15/2019 8/14/2018 1/16/2018 1/10/2017 4/21/2015 1/22/2015   PRIMARY LEARNER Patient Patient Patient Patient Patient Patient Patient   HIGHEST LEVEL OF EDUCATION - PRIMARY LEARNER  [address] [facility] CAREGIVER - - - No No Yes No   CO-LEARNER NAME - - - - - [address] [place] [address] - - - - - > 4 YEARS [address] [place] [address] - - - - - ENGLISH -    NEED - - - - - No No   LEARNER PREFERENCE PRIMARY OTHER (COMMENT) DEMONSTRATION DEMONSTRATION LISTENING LISTENING DEMONSTRATION DEMONSTRATION     DEMONSTRATION - - - - LISTENING -     READING - - - - - -   LEARNING SPECIAL TOPICS - - - - - - no   ANSWERED BY Patient  patient patient patient pt pt patient   RELATIONSHIP SELF SELF SELF SELF SELF SELF SELF   ASSESSMENT COMMENT - - - - - - none       Depression Screening:  :     3 most recent PHQ Screens 2/9/2021   Little interest or pleasure in doing things Not at all   Feeling down, depressed, irritable, or hopeless Not at all   Total Score PHQ 2 0   Trouble falling or staying asleep, or sleeping too much -   Feeling tired or having little energy -   Poor appetite, weight loss, or overeating -   Feeling bad about yourself - or that you are a failure or have let yourself or your family down -   Trouble concentrating on things such as school, work, reading, or watching TV -   Moving or speaking so slowly that other people could have noticed; or the opposite being so fidgety that others notice -   Thoughts of being better off dead, or hurting yourself in some way -   PHQ 9 Score -       Fall Risk Assessment:  :     Fall Risk Assessment, last 12 mths 2/9/2021   Able to walk? Yes   Fall in past 12 months? 0   Do you feel unsteady? 0   Are you worried about falling 0       Abuse Screening:  :     Abuse Screening Questionnaire 2/9/2021 10/8/2019 4/3/2019 2/15/2019 8/14/2018 1/22/2015   Do you ever feel afraid of your partner? N N N N N (No Data)   Are you in a relationship with someone who physically or mentally threatens you? N N N N N (No Data)   Is it safe for you to go home?  Y Y Y Y Y (No Data)

## 2021-03-02 RX ORDER — ESTRADIOL 1 MG/1
TABLET ORAL
Qty: 90 TAB | Refills: 2 | Status: SHIPPED | OUTPATIENT
Start: 2021-03-02 | End: 2021-11-22

## 2021-04-30 RX ORDER — PRAVASTATIN SODIUM 10 MG/1
TABLET ORAL
Qty: 90 TAB | Refills: 1 | Status: SHIPPED | OUTPATIENT
Start: 2021-04-30 | End: 2021-10-25 | Stop reason: SDUPTHER

## 2021-04-30 RX ORDER — ESCITALOPRAM OXALATE 10 MG/1
TABLET ORAL
Qty: 90 TAB | Refills: 0 | Status: SHIPPED | OUTPATIENT
Start: 2021-04-30 | End: 2021-07-27 | Stop reason: SDUPTHER

## 2021-06-30 NOTE — PROGRESS NOTES
Check up for history of ovarian cancer. Pt states no abnormal spotting, bleeding or pain. 1. Have you been to the ER, urgent care clinic since your last visit? Hospitalized since your last visit?no    2. Have you seen or consulted any other health care providers outside of the [address] [place] [address] since your last visit? Include any pap smears or colon screening.    no

## 2021-07-01 ENCOUNTER — OFFICE VISIT (OUTPATIENT)
Dept: GYNECOLOGY | Age: 76
End: 2021-07-01
Payer: MEDICARE

## 2021-07-01 VITALS
SYSTOLIC BLOOD PRESSURE: 169 MMHG | HEIGHT: 65 IN | HEART RATE: 67 BPM | DIASTOLIC BLOOD PRESSURE: 84 MMHG | WEIGHT: 167 LBS | BODY MASS INDEX: 27.82 KG/M2

## 2021-07-01 DIAGNOSIS — C56.1 MALIGNANT NEOPLASM OF RIGHT OVARY (HCC): Primary | ICD-10-CM

## 2021-07-01 PROCEDURE — G8753 SYS BP > OR = 140: HCPCS | Performed by: OBSTETRICS & GYNECOLOGY

## 2021-07-01 PROCEDURE — G9899 SCRN MAM PERF RSLTS DOC: HCPCS | Performed by: OBSTETRICS & GYNECOLOGY

## 2021-07-01 PROCEDURE — G0463 HOSPITAL OUTPT CLINIC VISIT: HCPCS | Performed by: OBSTETRICS & GYNECOLOGY

## 2021-07-01 PROCEDURE — 1090F PRES/ABSN URINE INCON ASSESS: CPT | Performed by: OBSTETRICS & GYNECOLOGY

## 2021-07-01 PROCEDURE — G8432 DEP SCR NOT DOC, RNG: HCPCS | Performed by: OBSTETRICS & GYNECOLOGY

## 2021-07-01 PROCEDURE — G8427 DOCREV CUR MEDS BY ELIG CLIN: HCPCS | Performed by: OBSTETRICS & GYNECOLOGY

## 2021-07-01 PROCEDURE — G8754 DIAS BP LESS 90: HCPCS | Performed by: OBSTETRICS & GYNECOLOGY

## 2021-07-01 PROCEDURE — G8399 PT W/DXA RESULTS DOCUMENT: HCPCS | Performed by: OBSTETRICS & GYNECOLOGY

## 2021-07-01 PROCEDURE — 1101F PT FALLS ASSESS-DOCD LE1/YR: CPT | Performed by: OBSTETRICS & GYNECOLOGY

## 2021-07-01 PROCEDURE — G8536 NO DOC ELDER MAL SCRN: HCPCS | Performed by: OBSTETRICS & GYNECOLOGY

## 2021-07-01 PROCEDURE — 3017F COLORECTAL CA SCREEN DOC REV: CPT | Performed by: OBSTETRICS & GYNECOLOGY

## 2021-07-01 PROCEDURE — 99213 OFFICE O/P EST LOW 20 MIN: CPT | Performed by: OBSTETRICS & GYNECOLOGY

## 2021-07-01 PROCEDURE — G8419 CALC BMI OUT NRM PARAM NOF/U: HCPCS | Performed by: OBSTETRICS & GYNECOLOGY

## 2021-07-01 NOTE — PROGRESS NOTES
[facility] GYNECOLOGIC ONCOLOGY  [address] [facility], [address] [facility]  [phone] [facility] [phone]      Office Visit    Patient ID:  Name: Mauro Pablo  MRN: [id number]  : 1945/75 y.o. Visit date: 2021    INTERVAL HISTORY:  Mauro Pablo is a  female who is an established patient with a diagnosis of stage IC, grade 3, ovarian carcinoma (clear cell). She is s/p surgical resection in 2013 and completed 6 cycles of Taxol and Carboplatin chemotherapy. She tolerated chemotherapy quite well. Her post-treatment PET/CT in 2013 showed no evidence of residual or metastatic disease. She has been followed since that time without evidence of disease. She presents today for routine surveillance. She is doing well and is without complaints. She denies any vaginal bleeding or discharge, any pelvic or abdominal pain, or any changes in her bowel or bladder habits. Her most recent CA-125 in 2019 was normal.        PMH:  Past Medical History:   Diagnosis Date    Adopted     Adverse effect of anesthesia     tremors and shaking after anesthesia/\"taks silly\"    BRCA negative 2013    Dr. Dru James Cervical arthritis     History of ovarian cancer 2013    surgery/chemotherapy    Hypercholesterolemia     Hypertension     Ill-defined condition     hx passing out from dehydration    Other anxiety states     Other ill-defined conditions(799.89) 2010    BOWEL OBSTRUCTION  ? IMPACTION    Unspecified adverse effect of anesthesia     TREMORS AFTERWARDS     PSH:  Past Surgical History:   Procedure Laterality Date    COLONOSCOPY N/A 2017    COLONOS[facility] performed by [facility]ix [facility] at [facility] ENDOSCOPY    HX CHOLECYSTECTOMY      HX DILATION AND CURETTAGE      HX GYN  13    RSO    HX GYN  13    TLH/LSO, omentectomy, lymph node dissection    HX TONSILLECTOMY      AS CHILD    HX TUBAL LIGATION      HX VASCULAR ACCESS      placed and removed    HX WISDOM TEETH EXTRACTION       SOC:  Social History     Socioeconomic History    Marital status:      Spouse name: Not on file    Number of children: Not on file    Years of education: Not on file    Highest education level: Not on file   Occupational History    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   Tobacco Use    Smoking status: Never Smoker    Smokeless tobacco: Never Used   Substance and Sexual Activity    Alcohol use: Yes     Alcohol/week: 10.0 standard drinks     Types: 10 Glasses of wine per week     Comment: 1-2 glasses nightly    Drug use: No    Sexual activity: Yes     Partners: Male   Other Topics Concern     Service Not Asked    Blood Transfusions Not Asked    Caffeine Concern Not Asked    Occupational Exposure Not Asked    Hobby Hazards Not Asked    Sleep Concern Not Asked    Stress Concern Not Asked    Weight Concern Not Asked    Special Diet Not Asked    Back Care Not Asked    Exercise Yes     Comment: walking 1/2 - 1 mile daily    Bike Helmet Not Asked    Seat Belt Not Asked    Self-Exams Not Asked   Social History Narrative    . Adopted by great aunt and uncle. Retired from [employer]. Two adult sons, one currently at home with her with his wife and grandchild. Social Determinants of Health     Financial Resource Strain:     Difficulty of Paying Living Expenses:    Food Insecurity:     Worried About Running Out of Food in the Last Year:     [address] in the Last Year:    Transportation Needs:     Lack of Transportation (Medical):      Lack of Transportation (Non-Medical):    Physical Activity:     Days of Exercise per Week:     Minutes of Exercise per Session:    Stress:     Feeling of Stress :    Social Connections:     Frequency of Communication with Friends and Family:     Frequency of Social Gatherings with Friends and Family:     Attends Orthodoxy Services:     Active Member of Clubs or Organizations:     Attends Club or Organization Meetings:     Marital Status:    Intimate Partner Violence:     Fear of Current or Ex-Partner:     Emotionally Abused:     Physically Abused:     Sexually Abused:      Family History  Family History   Problem Relation Age of Onset    Stroke Father     Hypertension Father     Parkinsonism Mother     Hypertension Mother     Dementia Mother     Diabetes Other         Maternal great aunt    Heart Attack Other         maternal great aunt    Heart Disease Other         maternal great aunt    Drug Abuse Son         opioid     Medications:  Current Outpatient Medications on File Prior to Visit   Medication Sig Dispense Refill    pravastatin (PRAVACHOL) 10 mg tablet TAKE 1 TABLET BY MOUTH EVERY NIGHT 90 Tab 1    escitalopram oxalate (LEXAPRO) 10 mg tablet TAKE 1 TABLET BY MOUTH DAILY. FOLLOW UP APPOINTMENT 90 Tab 0    estradioL (ESTRACE) 1 mg tablet TAKE 1 TABLET BY MOUTH EVERY DAY 90 Tab 2    olmesartan (BENICAR) 40 mg tablet Take 1 Tab by mouth daily. 90 Tab 1    FOLIC ACID/MULTIVIT-MIN/LUTEIN (CENTRUM SILVER PO) Take  by mouth. Takes one po once daily.  coenzyme q10 (CO Q-10) 300 mg cap Take 300 mg by mouth daily.  DOCOSAHEXANOIC ACID/EPA (FISH OIL PO) Take 1,200 mg by mouth daily.  estradiol (ESTRACE) 0.01 % (0.1 mg/gram) vaginal cream Insert 1 g into vagina two (2) times a week. SUN and WED      ascorbic acid (VITAMIN C) 500 mg tablet Take 250 mg by mouth daily. No current facility-administered medications on file prior to visit. Allergies: Allergies   Allergen Reactions    Simvastatin Myalgia    Codeine Other (comments)     Unable to function    Lidocaine Hives       ROS:  Negative except for that mentioned above.     OBJECTIVE:    PHYSICAL EXAM  VITAL SIGNS: Visit Vitals  Ht 5' 4.76\" (1.645 m)   Wt 167 lb (75.8 kg)   BMI 27.99 kg/m²      GENERAL ZOILA: in no apparent distress and well developed and well nourished   HEENT: within normal limits RESPIRATORY: lungs clear to auscultation and percussion   CARDIOVASC: Regular rate and rhythm without [facility]   GASTROINT: soft, non-tender, without masses or organomegaly   MUSCULOSKEL: no joint tenderness, deformity or swelling   EXTREMITIES: extremities normal, atraumatic, no cyanosis or edema   PELVIC: Normal external genitalia. Normal vagina. Uterus, cervix, and adnexa surgically absent. No masses or nodularity. RECTAL: Exam deferred. JAH SURVEY: Cervical, supraclavicular, and axillary nodes normal.   NEURO: Grossly normal       Lab Results   Component Value Date/Time    WBC 9.8 07/16/2020 12:00 AM    HGB 14.8 07/16/2020 12:00 AM    HCT 43.0 07/16/2020 12:00 AM    PLATELET 832 26/39/5170 12:00 AM    MCV 90 07/16/2020 12:00 AM     Lab Results   Component Value Date/Time    Sodium 136 02/10/2021 08:28 AM    Potassium 5.0 02/10/2021 08:28 AM    Chloride 103 02/10/2021 08:28 AM    CO2 28 02/10/2021 08:28 AM    Anion gap 5 02/10/2021 08:28 AM    Glucose 86 02/10/2021 08:28 AM    BUN 16 02/10/2021 08:28 AM    Creatinine 0.79 02/10/2021 08:28 AM    BUN/Creatinine ratio 20 02/10/2021 08:28 AM    GFR est AA >60 02/10/2021 08:28 AM    GFR est non-AA >60 02/10/2021 08:28 AM    Calcium 9.8 02/10/2021 08:28 AM       CT of abdomen/pelvis (7/16/15)  Limited images of the lung bases demonstrate a 3 mm nodule in the right    lower lobe (series 3, image 6) that is stable dating back to least a    December 2009. There is no new lung nodule, mass, or consolidation. The    heart size is normal. There is no pericardial or pleural effusion.     The liver, spleen, pancreas, and adrenal glands are normal. The kidneys are    symmetric without hydronephrosis.  The gall bladder is surgically absent    without intra- or extra-hepatic biliary dilatation.        There are no dilated bowel loops.  The appendix is normal.  There are no    enlarged lymph nodes.  There is no free fluid or free air.     The urinary bladder is normal.  There is no pelvic mass.  The bony    structures are age-appropriate. There is no aggressive blastic or lytic    osseous lesion.        IMPRESSION:    Stable exam with no evidence for disease recurrence or metastatic disease in    the abdomen or pelvis. IMPRESSION AND PLAN:  Feliz Acosta has a diagnosis of stage IC, grade 3, ovarian CA (clear cell), managed with surgical resection followed by adjuvant Taxol and Carboplatin chemotherapy. She has no evidence of disease based upon today's examination. I will plan to see her back in one year for continued surveillance. An electronic signature was used to sign this note.     Luh Shelton MD  07/01/21

## 2021-07-27 RX ORDER — ESCITALOPRAM OXALATE 10 MG/1
10 TABLET ORAL DAILY
Qty: 90 TABLET | Refills: 0 | Status: SHIPPED | OUTPATIENT
Start: 2021-07-27 | End: 2021-10-25

## 2021-08-05 ENCOUNTER — TRANSCRIBE ORDER (OUTPATIENT)
Dept: SCHEDULING | Age: 76
End: 2021-08-05

## 2021-08-05 DIAGNOSIS — Z12.31 VISIT FOR SCREENING MAMMOGRAM: Primary | ICD-10-CM

## 2021-08-11 ENCOUNTER — TRANSCRIBE ORDER (OUTPATIENT)
Dept: SCHEDULING | Age: 76
End: 2021-08-11

## 2021-08-11 DIAGNOSIS — E78.2 MIXED HYPERLIPIDEMIA: Primary | ICD-10-CM

## 2021-08-11 DIAGNOSIS — Z12.31 VISIT FOR SCREENING MAMMOGRAM: Primary | ICD-10-CM

## 2021-08-11 DIAGNOSIS — I10 ESSENTIAL HYPERTENSION: ICD-10-CM

## 2021-08-16 RX ORDER — OLMESARTAN MEDOXOMIL 40 MG/1
40 TABLET ORAL DAILY
Qty: 90 TABLET | Refills: 1 | Status: CANCELLED | OUTPATIENT
Start: 2021-08-16

## 2021-08-17 DIAGNOSIS — I10 ESSENTIAL HYPERTENSION: Primary | ICD-10-CM

## 2021-08-17 RX ORDER — OLMESARTAN MEDOXOMIL 40 MG/1
40 TABLET ORAL DAILY
Qty: 90 TABLET | Refills: 0 | Status: SHIPPED | OUTPATIENT
Start: 2021-08-17 | End: 2021-11-13

## 2021-08-18 DIAGNOSIS — I10 ESSENTIAL HYPERTENSION: ICD-10-CM

## 2021-08-18 RX ORDER — OLMESARTAN MEDOXOMIL 40 MG/1
40 TABLET ORAL DAILY
Qty: 90 TABLET | Refills: 0 | OUTPATIENT
Start: 2021-08-18

## 2021-08-26 ENCOUNTER — OFFICE VISIT (OUTPATIENT)
Dept: INTERNAL MEDICINE CLINIC | Age: 76
End: 2021-08-26
Payer: MEDICARE

## 2021-08-26 VITALS
HEART RATE: 66 BPM | TEMPERATURE: 97.5 F | BODY MASS INDEX: 28.02 KG/M2 | HEIGHT: 65 IN | SYSTOLIC BLOOD PRESSURE: 158 MMHG | DIASTOLIC BLOOD PRESSURE: 88 MMHG | RESPIRATION RATE: 16 BRPM | OXYGEN SATURATION: 98 % | WEIGHT: 168.2 LBS

## 2021-08-26 DIAGNOSIS — M16.0 PRIMARY OSTEOARTHRITIS OF BOTH HIPS: ICD-10-CM

## 2021-08-26 DIAGNOSIS — Z00.00 MEDICARE ANNUAL WELLNESS VISIT, SUBSEQUENT: Primary | ICD-10-CM

## 2021-08-26 DIAGNOSIS — E78.2 MIXED HYPERLIPIDEMIA: ICD-10-CM

## 2021-08-26 DIAGNOSIS — I10 ESSENTIAL HYPERTENSION: ICD-10-CM

## 2021-08-26 DIAGNOSIS — F41.9 ANXIETY: ICD-10-CM

## 2021-08-26 PROCEDURE — G8510 SCR DEP NEG, NO PLAN REQD: HCPCS | Performed by: INTERNAL MEDICINE

## 2021-08-26 PROCEDURE — G0439 PPPS, SUBSEQ VISIT: HCPCS | Performed by: INTERNAL MEDICINE

## 2021-08-26 PROCEDURE — G8754 DIAS BP LESS 90: HCPCS | Performed by: INTERNAL MEDICINE

## 2021-08-26 PROCEDURE — G8399 PT W/DXA RESULTS DOCUMENT: HCPCS | Performed by: INTERNAL MEDICINE

## 2021-08-26 PROCEDURE — 3017F COLORECTAL CA SCREEN DOC REV: CPT | Performed by: INTERNAL MEDICINE

## 2021-08-26 PROCEDURE — G8419 CALC BMI OUT NRM PARAM NOF/U: HCPCS | Performed by: INTERNAL MEDICINE

## 2021-08-26 PROCEDURE — G8536 NO DOC ELDER MAL SCRN: HCPCS | Performed by: INTERNAL MEDICINE

## 2021-08-26 PROCEDURE — G8427 DOCREV CUR MEDS BY ELIG CLIN: HCPCS | Performed by: INTERNAL MEDICINE

## 2021-08-26 PROCEDURE — G9899 SCRN MAM PERF RSLTS DOC: HCPCS | Performed by: INTERNAL MEDICINE

## 2021-08-26 PROCEDURE — 1101F PT FALLS ASSESS-DOCD LE1/YR: CPT | Performed by: INTERNAL MEDICINE

## 2021-08-26 PROCEDURE — 93010 ELECTROCARDIOGRAM REPORT: CPT | Performed by: INTERNAL MEDICINE

## 2021-08-26 PROCEDURE — 99214 OFFICE O/P EST MOD 30 MIN: CPT | Performed by: INTERNAL MEDICINE

## 2021-08-26 PROCEDURE — 93005 ELECTROCARDIOGRAM TRACING: CPT | Performed by: INTERNAL MEDICINE

## 2021-08-26 PROCEDURE — G8753 SYS BP > OR = 140: HCPCS | Performed by: INTERNAL MEDICINE

## 2021-08-26 PROCEDURE — 1090F PRES/ABSN URINE INCON ASSESS: CPT | Performed by: INTERNAL MEDICINE

## 2021-08-26 PROCEDURE — G0463 HOSPITAL OUTPT CLINIC VISIT: HCPCS | Performed by: INTERNAL MEDICINE

## 2021-08-26 RX ORDER — AMLODIPINE BESYLATE 5 MG/1
5 TABLET ORAL DAILY
Qty: 30 TABLET | Refills: 0 | Status: SHIPPED | OUTPATIENT
Start: 2021-08-26 | End: 2021-09-24 | Stop reason: SDUPTHER

## 2021-08-26 NOTE — PROGRESS NOTES
Chief Complaint   Patient presents with    Annual Wellness Visit             1. Have you been to the ER, urgent care clinic since your last visit? Hospitalized since your last visit? No    2. Have you seen or consulted any other health care providers outside of the [address] [place] [address] since your last visit? Include any pap smears or colon screening.  No

## 2021-08-26 NOTE — PATIENT INSTRUCTIONS
Medicare Wellness Visit, Female     The best way to live healthy is to have a lifestyle where you eat a well-balanced diet, exercise regularly, limit alcohol use, and quit all forms of tobacco/nicotine, if applicable. Regular preventive services are another way to keep healthy. Preventive services (vaccines, screening tests, monitoring & exams) can help personalize your care plan, which helps you manage your own care. Screening tests can find health problems at the earliest stages, when they are easiest to treat. [facility] follows the current, evidence-based guidelines published by the [facility] ([facility]) when recommending preventive services for our patients. Because we follow these guidelines, sometimes recommendations change over time as research supports it. (For example, mammograms used to be recommended annually. Even though Medicare will still pay for an annual mammogram, the newer guidelines recommend a mammogram every two years for women of average risk). Of course, you and your doctor may decide to screen more often for some diseases, based on your risk and your co-morbidities (chronic disease you are already diagnosed with). Preventive services for you include:  - Medicare offers their members a free annual wellness visit, which is time for you and your primary care provider to discuss and plan for your preventive service needs. Take advantage of this benefit every year!  -All adults over the age of 72 should receive the recommended pneumonia vaccines. Current USPSTF guidelines recommend a series of two vaccines for the best pneumonia protection.   -All adults should have a flu vaccine yearly and a tetanus vaccine every 10 years.   -All adults age 48 and older should receive the shingles vaccines (series of two vaccines).       -All adults age 38-68 who are overweight should have a diabetes screening test once every three years.   -All adults born between [birth year] and 1965 should be screened once for Hepatitis C.  -Other screening tests and preventive services for persons with diabetes include: an eye exam to screen for diabetic retinopathy, a kidney function test, a foot exam, and stricter control over your cholesterol.   -Cardiovascular screening for adults with routine risk involves an electrocardiogram (ECG) at intervals determined by your doctor.   -Colorectal cancer screenings should be done for adults age 54-65 with no increased risk factors for colorectal cancer. There are a number of acceptable methods of screening for this type of cancer. Each test has its own benefits and drawbacks. Discuss with your doctor what is most appropriate for you during your annual wellness visit. The different tests include: colonoscopy (considered the best screening method), a fecal occult blood test, a fecal DNA test, and sigmoidoscopy.    -A bone mass density test is recommended when a woman turns 65 to screen for osteoporosis. This test is only recommended one time, as a screening. Some providers will use this same test as a disease monitoring tool if you already have osteoporosis. -Breast cancer screenings are recommended every other year for women of normal risk, age 54-69.  -Cervical cancer screenings for women over age 72 are only recommended with certain risk factors. Here is a list of your current Health Maintenance items (your personalized list of preventive services) with a due date: There are no preventive care reminders to display for this patient.

## 2021-09-08 ENCOUNTER — HOSPITAL ENCOUNTER (OUTPATIENT)
Dept: MAMMOGRAPHY | Age: 76
Discharge: HOME | End: 2021-09-08
Attending: OBSTETRICS & GYNECOLOGY
Payer: MEDICARE

## 2021-09-08 DIAGNOSIS — Z12.31 VISIT FOR SCREENING MAMMOGRAM: ICD-10-CM

## 2021-09-08 PROCEDURE — 77063 BREAST TOMOSYNTHESIS BI: CPT

## 2021-09-22 ENCOUNTER — TELEPHONE (OUTPATIENT)
Dept: INTERNAL MEDICINE CLINIC | Age: 76
End: 2021-09-22

## 2021-09-22 NOTE — TELEPHONE ENCOUNTER
Patient report swollen in left ear, sore. Right ear itching, like eczema. Tried urgent care in [facility], no available appointment. Used hydrocortisone 1% in ear. Would like Dr Hakeem So to prescribe medication, advised no treatment at this time until seen. Advise patient to try to get into an urgent care or ER . Understanding verbalized.

## 2021-09-24 RX ORDER — AMLODIPINE BESYLATE 5 MG/1
5 TABLET ORAL DAILY
Qty: 30 TABLET | Refills: 0 | Status: SHIPPED | OUTPATIENT
Start: 2021-09-24 | End: 2021-09-30 | Stop reason: SDUPTHER

## 2021-09-24 NOTE — TELEPHONE ENCOUNTER
Requested Prescriptions     Pending Prescriptions Disp Refills    amLODIPine (NORVASC) 5 mg tablet 30 Tablet 0     Sig: Take 1 Tablet by mouth daily.          Requested quantity: 90.0        [facility] [address] [place] [address], [address] [place], [place] [address]Y AT [address] [place] [address] & [id number]  [phone]

## 2021-09-30 ENCOUNTER — OFFICE VISIT (OUTPATIENT)
Dept: INTERNAL MEDICINE CLINIC | Age: 76
End: 2021-09-30
Payer: MEDICARE

## 2021-09-30 VITALS
RESPIRATION RATE: 16 BRPM | HEART RATE: 72 BPM | SYSTOLIC BLOOD PRESSURE: 138 MMHG | WEIGHT: 170 LBS | BODY MASS INDEX: 28.32 KG/M2 | HEIGHT: 65 IN | DIASTOLIC BLOOD PRESSURE: 72 MMHG | TEMPERATURE: 97.3 F | OXYGEN SATURATION: 96 %

## 2021-09-30 DIAGNOSIS — Z23 ENCOUNTER FOR IMMUNIZATION: ICD-10-CM

## 2021-09-30 DIAGNOSIS — I10 ESSENTIAL HYPERTENSION: Primary | ICD-10-CM

## 2021-09-30 DIAGNOSIS — H60.501 ACUTE OTITIS EXTERNA OF RIGHT EAR, UNSPECIFIED TYPE: ICD-10-CM

## 2021-09-30 PROCEDURE — G8510 SCR DEP NEG, NO PLAN REQD: HCPCS | Performed by: INTERNAL MEDICINE

## 2021-09-30 PROCEDURE — 99214 OFFICE O/P EST MOD 30 MIN: CPT | Performed by: INTERNAL MEDICINE

## 2021-09-30 PROCEDURE — 1090F PRES/ABSN URINE INCON ASSESS: CPT | Performed by: INTERNAL MEDICINE

## 2021-09-30 PROCEDURE — G8419 CALC BMI OUT NRM PARAM NOF/U: HCPCS | Performed by: INTERNAL MEDICINE

## 2021-09-30 PROCEDURE — G8536 NO DOC ELDER MAL SCRN: HCPCS | Performed by: INTERNAL MEDICINE

## 2021-09-30 PROCEDURE — 3017F COLORECTAL CA SCREEN DOC REV: CPT | Performed by: INTERNAL MEDICINE

## 2021-09-30 PROCEDURE — G8754 DIAS BP LESS 90: HCPCS | Performed by: INTERNAL MEDICINE

## 2021-09-30 PROCEDURE — G8752 SYS BP LESS 140: HCPCS | Performed by: INTERNAL MEDICINE

## 2021-09-30 PROCEDURE — G8427 DOCREV CUR MEDS BY ELIG CLIN: HCPCS | Performed by: INTERNAL MEDICINE

## 2021-09-30 PROCEDURE — G8399 PT W/DXA RESULTS DOCUMENT: HCPCS | Performed by: INTERNAL MEDICINE

## 2021-09-30 PROCEDURE — G9899 SCRN MAM PERF RSLTS DOC: HCPCS | Performed by: INTERNAL MEDICINE

## 2021-09-30 PROCEDURE — 1101F PT FALLS ASSESS-DOCD LE1/YR: CPT | Performed by: INTERNAL MEDICINE

## 2021-09-30 PROCEDURE — 90694 VACC AIIV4 NO PRSRV 0.5ML IM: CPT | Performed by: INTERNAL MEDICINE

## 2021-09-30 PROCEDURE — G0463 HOSPITAL OUTPT CLINIC VISIT: HCPCS | Performed by: INTERNAL MEDICINE

## 2021-09-30 RX ORDER — AMLODIPINE BESYLATE 5 MG/1
5 TABLET ORAL DAILY
Qty: 90 TABLET | Refills: 1 | Status: SHIPPED | OUTPATIENT
Start: 2021-09-30 | End: 2022-03-01

## 2021-09-30 RX ORDER — NEOMYCIN SULFATE, POLYMYXIN B SULFATE AND HYDROCORTISONE 10; 3.5; 1 MG/ML; MG/ML; [USP'U]/ML
SUSPENSION/ DROPS AURICULAR (OTIC)
COMMUNITY
Start: 2021-09-22 | End: 2021-09-30 | Stop reason: SDUPTHER

## 2021-09-30 RX ORDER — NEOMYCIN SULFATE, POLYMYXIN B SULFATE AND HYDROCORTISONE 10; 3.5; 1 MG/ML; MG/ML; [USP'U]/ML
SUSPENSION/ DROPS AURICULAR (OTIC)
Qty: 10 ML | Refills: 0 | Status: SHIPPED | OUTPATIENT
Start: 2021-09-30 | End: 2022-01-07

## 2021-09-30 NOTE — PROGRESS NOTES
Anne-Marie Judd is a 70 y.o. female who was seen today for a follow-up visit. Assessment & Plan:       ICD-10-CM ICD-9-CM    1. Essential hypertension   Much improved with home blood pressures. Borderline here today. She is going to continue with current medications and work on lifestyle changes over the next 3 months and will follow up at that time for reassessment. I10 401.9    2. Acute otitis externa of right ear, unspecified type   Cortisporin otic and counseled. H60.501 380.10    3. Encounter for immunization  Z23 V03.89 FLU (FLUAD QUAD INFLUENZA VACCINE,QUAD,ADJUVANTED)   4.  Left ear otitis externa has resolved. Follow-up and Dispositions    · Return in about 3 months (around 12/30/2021) for follow up. reviewed diet, exercise and weight control  reviewed medications and side effects in detail. Subjective:   Anne-Marie Judd was seen for hypertension: BP ranging 130/70's at home since adding amlodipine. She is tolerating medication well and denies side effects. She also recently returned from vacation in had an episode of left otitis externa that she was seen for and treated with Cortisporin otic. Just finished her last day of medication and her left ear symptoms are resolved. However right ear now she feels some inflammation and tenderness. Review of Systems   Constitutional: Negative for fever. HENT: Negative for ear discharge. Cardiovascular: Negative for chest pain and leg swelling. Gastrointestinal: Negative for abdominal pain. Neurological: Positive for dizziness (sometimes when first gets up in the morning). - per HPI    Physical Exam  Vitals and nursing note reviewed. Constitutional:       General: She is not in acute distress. Appearance: She is well-developed. HENT:      Head: Normocephalic and atraumatic.       Right Ear: Ear canal normal.      Left Ear: Tympanic membrane and ear canal normal.      Ears:      Comments: External canal erythema and dry flaking skin  Cardiovascular:      Rate and Rhythm: Normal rate and regular rhythm. Pulmonary:      Effort: Pulmonary effort is normal.      Breath sounds: Normal breath sounds. No wheezing. Abdominal:      General: Bowel sounds are normal. There is no distension. Palpations: Abdomen is soft. There is no mass. Tenderness: There is no abdominal tenderness. Musculoskeletal:      Right lower leg: No edema. Psychiatric:         Mood and Affect: Mood normal.       Visit Vitals  /72   Pulse 72   Temp 97.3 °F (36.3 °C) (Temporal)   Resp 16   Ht 5' 4.75\" (1.645 m)   Wt 170 lb (77.1 kg)   SpO2 96%   BMI 28.51 kg/m²          Aspects of this note may have been generated using voice recognition software. Despite editing, there may be some syntax errors   I have discussed the diagnosis with the patient and the intended plan as seen in the above orders. The patient has received an after-visit summary and questions were answered concerning future plans. I have discussed any recommended medication side effects and warnings with the patient as well.   She has expressed understanding of the diagnosis and plan    Stefani Chadwick MD

## 2021-09-30 NOTE — PROGRESS NOTES
Chief Complaint   Patient presents with    Blood Pressure Check     1 mo follow up       1. Have you been to the ER, urgent care clinic since your last visit? Hospitalized since your last visit? Yes When: 09/2021 Where: [facility] Reason for visit: Ear infection     2. Have you seen or consulted any other health care providers outside of the [address] [place] [address] since your last visit? Include any pap smears or colon screening.  No

## 2021-10-21 RX ORDER — AMLODIPINE BESYLATE 5 MG/1
5 TABLET ORAL DAILY
Qty: 90 TABLET | Refills: 1 | OUTPATIENT
Start: 2021-10-21

## 2021-10-23 DIAGNOSIS — F41.9 ANXIETY: Primary | ICD-10-CM

## 2021-10-25 DIAGNOSIS — E78.2 MIXED HYPERLIPIDEMIA: Primary | ICD-10-CM

## 2021-10-25 RX ORDER — AMLODIPINE BESYLATE 5 MG/1
5 TABLET ORAL DAILY
Qty: 30 TABLET | OUTPATIENT
Start: 2021-10-25

## 2021-10-25 RX ORDER — PRAVASTATIN SODIUM 10 MG/1
10 TABLET ORAL
Qty: 90 TABLET | Refills: 1 | Status: SHIPPED | OUTPATIENT
Start: 2021-10-25 | End: 2022-04-24

## 2021-10-25 RX ORDER — ESCITALOPRAM OXALATE 10 MG/1
10 TABLET ORAL DAILY
Qty: 90 TABLET | Refills: 0 | Status: SHIPPED | OUTPATIENT
Start: 2021-10-25 | End: 2021-10-27

## 2021-10-27 DIAGNOSIS — F41.9 ANXIETY: ICD-10-CM

## 2021-10-27 RX ORDER — ESCITALOPRAM OXALATE 10 MG/1
10 TABLET ORAL DAILY
Qty: 90 TABLET | Refills: 0 | Status: SHIPPED | OUTPATIENT
Start: 2021-10-27 | End: 2022-02-08 | Stop reason: SDUPTHER

## 2021-11-22 RX ORDER — ESTRADIOL 1 MG/1
TABLET ORAL
Qty: 90 TABLET | Refills: 2 | Status: SHIPPED | OUTPATIENT
Start: 2021-11-22 | End: 2022-05-22 | Stop reason: SDUPTHER

## 2022-01-07 ENCOUNTER — OFFICE VISIT (OUTPATIENT)
Dept: INTERNAL MEDICINE CLINIC | Age: 77
End: 2022-01-07
Payer: MEDICARE

## 2022-01-07 VITALS
HEIGHT: 65 IN | DIASTOLIC BLOOD PRESSURE: 81 MMHG | BODY MASS INDEX: 28.02 KG/M2 | SYSTOLIC BLOOD PRESSURE: 137 MMHG | HEART RATE: 72 BPM | TEMPERATURE: 97.3 F | RESPIRATION RATE: 18 BRPM | OXYGEN SATURATION: 97 % | WEIGHT: 168.2 LBS

## 2022-01-07 DIAGNOSIS — B37.2 YEAST INFECTION OF THE SKIN: ICD-10-CM

## 2022-01-07 DIAGNOSIS — I10 ESSENTIAL HYPERTENSION: Primary | ICD-10-CM

## 2022-01-07 PROCEDURE — 1090F PRES/ABSN URINE INCON ASSESS: CPT | Performed by: INTERNAL MEDICINE

## 2022-01-07 PROCEDURE — G8510 SCR DEP NEG, NO PLAN REQD: HCPCS | Performed by: INTERNAL MEDICINE

## 2022-01-07 PROCEDURE — G8399 PT W/DXA RESULTS DOCUMENT: HCPCS | Performed by: INTERNAL MEDICINE

## 2022-01-07 PROCEDURE — G8419 CALC BMI OUT NRM PARAM NOF/U: HCPCS | Performed by: INTERNAL MEDICINE

## 2022-01-07 PROCEDURE — G8754 DIAS BP LESS 90: HCPCS | Performed by: INTERNAL MEDICINE

## 2022-01-07 PROCEDURE — G8752 SYS BP LESS 140: HCPCS | Performed by: INTERNAL MEDICINE

## 2022-01-07 PROCEDURE — 3017F COLORECTAL CA SCREEN DOC REV: CPT | Performed by: INTERNAL MEDICINE

## 2022-01-07 PROCEDURE — 1101F PT FALLS ASSESS-DOCD LE1/YR: CPT | Performed by: INTERNAL MEDICINE

## 2022-01-07 PROCEDURE — G9899 SCRN MAM PERF RSLTS DOC: HCPCS | Performed by: INTERNAL MEDICINE

## 2022-01-07 PROCEDURE — G0463 HOSPITAL OUTPT CLINIC VISIT: HCPCS | Performed by: INTERNAL MEDICINE

## 2022-01-07 PROCEDURE — G8536 NO DOC ELDER MAL SCRN: HCPCS | Performed by: INTERNAL MEDICINE

## 2022-01-07 PROCEDURE — 99214 OFFICE O/P EST MOD 30 MIN: CPT | Performed by: INTERNAL MEDICINE

## 2022-01-07 PROCEDURE — G8427 DOCREV CUR MEDS BY ELIG CLIN: HCPCS | Performed by: INTERNAL MEDICINE

## 2022-01-07 RX ORDER — NYSTATIN 100000 U/G
CREAM TOPICAL 2 TIMES DAILY
Qty: 45 G | Refills: 0 | Status: SHIPPED | OUTPATIENT
Start: 2022-01-07 | End: 2022-03-01 | Stop reason: SDUPTHER

## 2022-01-07 NOTE — PROGRESS NOTES
Sheron Kingston is a 78 y.o. female who was seen today for a follow-up visit. Assessment & Plan:   Diagnoses and all orders for this visit:    1. Essential hypertension  Assessment & Plan:   well controlled, continue current medications      2. Yeast infection of the skin  Partially treated. Reviewed treatment options. Diflucan interacts with her other medication. We switch clotrimazole to Mycostatin to see if that is more effective. Follow-up if not resolving. Other orders  -     nystatin (MYCOSTATIN) topical cream; Apply  to affected area two (2) times a day. Follow-up 6 months. lab results and schedule of future lab studies reviewed with patient. Subjective:   Sheron Kingston was seen for Hypertension and Rash (under breast)    1. Hypertension:  BP ranging 120's/50-60's. Taking medication. No side effects. 2.  Note rash under her breast which started a few weeks ago. Her OB recommended an antifungal cream Clotrimazole on 12/23/2021 which she has been using twice a day with some mild relief. Continues to be red and itchy. .    Review of Systems   Respiratory: Negative for shortness of breath. Cardiovascular: Negative for chest pain and leg swelling. Gastrointestinal: Negative for abdominal pain. Neurological: Positive for dizziness (occasional with position changes. ). Negative for headaches. - per HPI    Physical Exam  Vitals and nursing note reviewed. Constitutional:       General: She is not in acute distress. Appearance: She is well-developed. HENT:      Head: Normocephalic and atraumatic. Cardiovascular:      Rate and Rhythm: Normal rate and regular rhythm. Pulmonary:      Effort: Pulmonary effort is normal.      Breath sounds: Normal breath sounds. Abdominal:      General: Bowel sounds are normal.      Palpations: Abdomen is soft. Tenderness: There is no abdominal tenderness. Musculoskeletal:      Right lower leg: No edema. Left lower leg: No edema. Skin:     Comments: Under both breast area of erythema with satellite papules. Visit Vitals  /81   Pulse 72   Temp 97.3 °F (36.3 °C) (Temporal)   Resp 18   Ht 5' 4.75\" (1.645 m)   Wt 168 lb 3.2 oz (76.3 kg)   SpO2 97%   BMI 28.21 kg/m²        Aspects of this note may have been generated using voice recognition software. Despite editing, there may be some syntax errors   I have discussed the diagnosis with the patient and the intended plan as seen in the above orders. The patient has received an after-visit summary and questions were answered concerning future plans. I have discussed any recommended medication side effects and warnings with the patient as well.   She has expressed understanding of the diagnosis and plan    Lencho Bay MD

## 2022-02-08 DIAGNOSIS — F41.9 ANXIETY: ICD-10-CM

## 2022-02-08 RX ORDER — ESCITALOPRAM OXALATE 10 MG/1
10 TABLET ORAL DAILY
Qty: 90 TABLET | Refills: 0 | Status: SHIPPED | OUTPATIENT
Start: 2022-02-08 | End: 2022-05-02 | Stop reason: SDUPTHER

## 2022-03-01 ENCOUNTER — OFFICE VISIT (OUTPATIENT)
Dept: INTERNAL MEDICINE CLINIC | Age: 77
End: 2022-03-01
Payer: MEDICARE

## 2022-03-01 VITALS
TEMPERATURE: 97.1 F | OXYGEN SATURATION: 99 % | WEIGHT: 166.8 LBS | HEIGHT: 65 IN | RESPIRATION RATE: 18 BRPM | SYSTOLIC BLOOD PRESSURE: 118 MMHG | BODY MASS INDEX: 27.79 KG/M2 | DIASTOLIC BLOOD PRESSURE: 64 MMHG | HEART RATE: 72 BPM

## 2022-03-01 DIAGNOSIS — R53.83 FATIGUE, UNSPECIFIED TYPE: ICD-10-CM

## 2022-03-01 DIAGNOSIS — B37.2 YEAST INFECTION OF THE SKIN: Primary | ICD-10-CM

## 2022-03-01 DIAGNOSIS — I10 ESSENTIAL HYPERTENSION: ICD-10-CM

## 2022-03-01 DIAGNOSIS — R42 POSTURAL DIZZINESS: ICD-10-CM

## 2022-03-01 PROCEDURE — 99214 OFFICE O/P EST MOD 30 MIN: CPT | Performed by: INTERNAL MEDICINE

## 2022-03-01 PROCEDURE — G8399 PT W/DXA RESULTS DOCUMENT: HCPCS | Performed by: INTERNAL MEDICINE

## 2022-03-01 PROCEDURE — G8510 SCR DEP NEG, NO PLAN REQD: HCPCS | Performed by: INTERNAL MEDICINE

## 2022-03-01 PROCEDURE — G8427 DOCREV CUR MEDS BY ELIG CLIN: HCPCS | Performed by: INTERNAL MEDICINE

## 2022-03-01 PROCEDURE — G8419 CALC BMI OUT NRM PARAM NOF/U: HCPCS | Performed by: INTERNAL MEDICINE

## 2022-03-01 PROCEDURE — G8536 NO DOC ELDER MAL SCRN: HCPCS | Performed by: INTERNAL MEDICINE

## 2022-03-01 PROCEDURE — G9899 SCRN MAM PERF RSLTS DOC: HCPCS | Performed by: INTERNAL MEDICINE

## 2022-03-01 PROCEDURE — G0463 HOSPITAL OUTPT CLINIC VISIT: HCPCS | Performed by: INTERNAL MEDICINE

## 2022-03-01 PROCEDURE — 3017F COLORECTAL CA SCREEN DOC REV: CPT | Performed by: INTERNAL MEDICINE

## 2022-03-01 PROCEDURE — 1101F PT FALLS ASSESS-DOCD LE1/YR: CPT | Performed by: INTERNAL MEDICINE

## 2022-03-01 PROCEDURE — G8752 SYS BP LESS 140: HCPCS | Performed by: INTERNAL MEDICINE

## 2022-03-01 PROCEDURE — G8754 DIAS BP LESS 90: HCPCS | Performed by: INTERNAL MEDICINE

## 2022-03-01 PROCEDURE — 1090F PRES/ABSN URINE INCON ASSESS: CPT | Performed by: INTERNAL MEDICINE

## 2022-03-01 RX ORDER — NYSTATIN 100000 [USP'U]/G
POWDER TOPICAL
Qty: 60 G | Refills: 0 | Status: SHIPPED | OUTPATIENT
Start: 2022-03-01

## 2022-03-01 RX ORDER — NYSTATIN 100000 U/G
CREAM TOPICAL 2 TIMES DAILY
Qty: 45 G | Refills: 1 | Status: SHIPPED | OUTPATIENT
Start: 2022-03-01

## 2022-03-01 NOTE — PROGRESS NOTES
Roger Owen is a 78 y.o. female who was seen today for an acute visit. Assessment & Plan:   1. Yeast infection of the skin  Will restart nystatin topical for the next 2 weeks and then as needed for flares. Also given nystatin powder to use 3 times a week for prophylaxis. 2. Essential hypertension  At goal but concerned that amlodipine may be causing some side effects including her fatigue and contributing to her dizziness. Stop amlodipine. Continue with olmesartan. She will send me a [employer] message in 2 weeks with blood pressure measurements and an update on how she is feeling with stopping amlodipine. 3. Postural dizziness  Counseled regarding importance of remaining well-hydrated to decrease symptoms. Stop amlodipine as above. 4. Fatigue, unspecified type  Follow-up if not improving with above changes. Otherwise follow-up 3 months. Subjective:   Roger Owen was seen for Rash (x 1 month) and Dizziness  She has several concerns today. She reports that her yeast infection under her breast resolved after 4 weeks of treatment with nystatin and then was off of medication for 2 weeks and rash returned and is itchy and irritating to her. She is concerned amlodipine is causing her to lose hair and feel fatigued. She does not feel she is sleeping well and having bad dreams since she started her amlodipine. She also notes that when she changes position she will feel lightheaded. She does admit that she does not drink enough fluids. BP: not checking the past few weeks. Review of Systems   Respiratory: Negative for shortness of breath. Cardiovascular: Negative for chest pain and leg swelling. Gastrointestinal: Negative for abdominal pain. Psychiatric/Behavioral: Negative for depression.    - per HPI    Physical Exam  Vitals and nursing note reviewed. Constitutional:       General: She is not in acute distress. Appearance: She is well-developed.    HENT:      Head: Normocephalic and atraumatic. Cardiovascular:      Rate and Rhythm: Normal rate and regular rhythm. Pulmonary:      Effort: Pulmonary effort is normal.      Breath sounds: Normal breath sounds. Abdominal:      General: Bowel sounds are normal.      Palpations: Abdomen is soft. Tenderness: There is no abdominal tenderness. Musculoskeletal:      Right lower leg: No edema. Left lower leg: No edema. Skin:     Findings: Rash (Mild erythema with few erythematous papules under breasts bilaterally) present. Visit Vitals  /64   Pulse 72   Temp 97.1 °F (36.2 °C) (Temporal)   Resp 18   Ht 5' 4.75\" (1.645 m)   Wt 166 lb 12.8 oz (75.7 kg)   SpO2 99%   BMI 27.97 kg/m²          Aspects of this note may have been generated using voice recognition software. Despite editing, there may be some syntax errors   I have discussed the diagnosis with the patient and the intended plan as seen in the above orders. The patient has received an after-visit summary and questions were answered concerning future plans. I have discussed any recommended medication side effects and warnings with the patient as well.   She has expressed understanding of the diagnosis and plan    Mila Chandler MD

## 2022-03-19 PROBLEM — I10 ESSENTIAL HYPERTENSION: Status: ACTIVE | Noted: 2017-01-05

## 2022-03-19 PROBLEM — E78.2 MIXED HYPERLIPIDEMIA: Status: ACTIVE | Noted: 2019-04-03

## 2022-05-02 DIAGNOSIS — I10 ESSENTIAL HYPERTENSION: ICD-10-CM

## 2022-05-02 DIAGNOSIS — F41.9 ANXIETY: ICD-10-CM

## 2022-05-02 RX ORDER — ESCITALOPRAM OXALATE 10 MG/1
10 TABLET ORAL DAILY
Qty: 90 TABLET | Refills: 0 | Status: SHIPPED | OUTPATIENT
Start: 2022-05-02 | End: 2022-08-11

## 2022-05-02 RX ORDER — OLMESARTAN MEDOXOMIL 40 MG/1
40 TABLET ORAL DAILY
Qty: 90 TABLET | Refills: 1 | Status: SHIPPED | OUTPATIENT
Start: 2022-05-02 | End: 2022-08-30 | Stop reason: SDUPTHER

## 2022-05-05 RX ORDER — OLMESARTAN MEDOXOMIL 40 MG/1
TABLET ORAL
Qty: 90 TABLET | Refills: 1 | OUTPATIENT
Start: 2022-05-05

## 2022-05-05 RX ORDER — ESCITALOPRAM OXALATE 10 MG/1
TABLET ORAL
Qty: 90 TABLET | Refills: 0 | OUTPATIENT
Start: 2022-05-05

## 2022-05-22 RX ORDER — ESTRADIOL 1 MG/1
TABLET ORAL
Qty: 90 TABLET | Refills: 2 | Status: SHIPPED | OUTPATIENT
Start: 2022-05-22

## 2022-08-01 NOTE — PROGRESS NOTES
Check up for history of ovarian cancer. Pt states no abnormal spotting, bleeding or pain. 1. Have you been to the ER, urgent care clinic since your last visit? Hospitalized since your last visit?no    2. Have you seen or consulted any other health care providers outside of the [address] [place] [address] since your last visit? Include any pap smears or colon screening.  no

## 2022-08-02 ENCOUNTER — OFFICE VISIT (OUTPATIENT)
Dept: GYNECOLOGY | Age: 77
End: 2022-08-02
Payer: MEDICARE

## 2022-08-02 VITALS
DIASTOLIC BLOOD PRESSURE: 82 MMHG | WEIGHT: 171 LBS | BODY MASS INDEX: 28.49 KG/M2 | HEIGHT: 65 IN | SYSTOLIC BLOOD PRESSURE: 168 MMHG | HEART RATE: 71 BPM

## 2022-08-02 DIAGNOSIS — Z85.43 HISTORY OF MALIGNANT NEOPLASM OF OVARY: Primary | ICD-10-CM

## 2022-08-02 PROCEDURE — G8417 CALC BMI ABV UP PARAM F/U: HCPCS | Performed by: OBSTETRICS & GYNECOLOGY

## 2022-08-02 PROCEDURE — G0463 HOSPITAL OUTPT CLINIC VISIT: HCPCS | Performed by: OBSTETRICS & GYNECOLOGY

## 2022-08-02 PROCEDURE — G8753 SYS BP > OR = 140: HCPCS | Performed by: OBSTETRICS & GYNECOLOGY

## 2022-08-02 PROCEDURE — 1090F PRES/ABSN URINE INCON ASSESS: CPT | Performed by: OBSTETRICS & GYNECOLOGY

## 2022-08-02 PROCEDURE — G8754 DIAS BP LESS 90: HCPCS | Performed by: OBSTETRICS & GYNECOLOGY

## 2022-08-02 PROCEDURE — G8536 NO DOC ELDER MAL SCRN: HCPCS | Performed by: OBSTETRICS & GYNECOLOGY

## 2022-08-02 PROCEDURE — 99213 OFFICE O/P EST LOW 20 MIN: CPT | Performed by: OBSTETRICS & GYNECOLOGY

## 2022-08-02 PROCEDURE — 3017F COLORECTAL CA SCREEN DOC REV: CPT | Performed by: OBSTETRICS & GYNECOLOGY

## 2022-08-02 PROCEDURE — G8399 PT W/DXA RESULTS DOCUMENT: HCPCS | Performed by: OBSTETRICS & GYNECOLOGY

## 2022-08-02 PROCEDURE — G9899 SCRN MAM PERF RSLTS DOC: HCPCS | Performed by: OBSTETRICS & GYNECOLOGY

## 2022-08-02 PROCEDURE — G8432 DEP SCR NOT DOC, RNG: HCPCS | Performed by: OBSTETRICS & GYNECOLOGY

## 2022-08-02 PROCEDURE — 1101F PT FALLS ASSESS-DOCD LE1/YR: CPT | Performed by: OBSTETRICS & GYNECOLOGY

## 2022-08-02 PROCEDURE — G8427 DOCREV CUR MEDS BY ELIG CLIN: HCPCS | Performed by: OBSTETRICS & GYNECOLOGY

## 2022-08-02 PROCEDURE — 1123F ACP DISCUSS/DSCN MKR DOCD: CPT | Performed by: OBSTETRICS & GYNECOLOGY

## 2022-08-02 NOTE — PROGRESS NOTES
[facility] GYNECOLOGIC ONCOLOGY  [address] [place] [address], [id number] [facility]  [phone] [facility] [phone]      Office Visit    Patient ID:  Name: Fermin Avery  MRN: [id number]  : 1945/76 y.o. Visit date: 2022    INTERVAL HISTORY:  Fermin Avery is a  female who is an established patient with a diagnosis of stage IC, grade 3, ovarian carcinoma (clear cell). She is s/p surgical resection in 2013 and completed 6 cycles of Taxol and Carboplatin chemotherapy. She tolerated chemotherapy quite well. Her post-treatment PET/CT in 2013 showed no evidence of residual or metastatic disease. She has been followed since that time without evidence of disease. She presents today for routine surveillance. She is doing well and is without complaints. She denies any vaginal bleeding or discharge, any pelvic or abdominal pain, or any changes in her bowel or bladder habits. Her most recent CA-125 in 2019 was normal.        PMH:  Past Medical History:   Diagnosis Date    Adopted     Adverse effect of anesthesia     tremors and shaking after anesthesia/\"taks silly\"    BRCA negative 2013    Dr. Renato Parisi    Cervical arthritis     History of ovarian cancer 2013    surgery/chemotherapy    Hypercholesterolemia     Hypertension     Ill-defined condition     hx passing out from dehydration    Other anxiety states     Other ill-defined conditions(799.89) 2010    BOWEL OBSTRUCTION  ? IMPACTION    Ovarian cancer ([facility] [address] 2016    Unspecified adverse effect of anesthesia     TREMORS AFTERWARDS     PSH:  Past Surgical History:   Procedure Laterality Date    COLONOSCOPY N/A 2017    COLONOSCOPY performed by Melissa Jarrell MD at [facility] ENDOSCOPY    HX CHOLECYSTECTOMY      HX DILATION AND CURETTAGE      HX GYN  13    RSO    HX GYN  13    TLH/LSO, omentectomy, lymph node dissection    HX TONSILLECTOMY      AS CHILD    HX TUBAL LIGATION      HX VASCULAR ACCESS      placed and removed    HX WISDOM TEETH EXTRACTION       SOC:  Social History     Socioeconomic History    Marital status:      Spouse name: Not on file    Number of children: Not on file    Years of education: Not on file    Highest education level: Not on file   Occupational History    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   Tobacco Use    Smoking status: Never    Smokeless tobacco: Never   Vaping Use    Vaping Use: Never used   Substance and Sexual Activity    Alcohol use: Yes     Alcohol/week: 10.0 standard drinks     Types: 10 Glasses of wine per week     Comment: 1-2 glasses nightly    Drug use: No    Sexual activity: Yes     Partners: Male   Other Topics Concern     Service Not Asked    Blood Transfusions Not Asked    Caffeine Concern Not Asked    Occupational Exposure Not Asked    Hobby Hazards Not Asked    Sleep Concern Not Asked    Stress Concern Not Asked    Weight Concern Not Asked    Special Diet Not Asked    Back Care Not Asked    Exercise Yes     Comment: walking 1/2 - 1 mile daily    Bike Helmet Not Asked    Seat Belt Not Asked    Self-Exams Not Asked   Social History Narrative    . Adopted by great aunt and uncle. Retired from [employer] industry. Two adult sons, one currently at home with her with his wife and grandchild.        Social Determinants of Health     Financial Resource Strain: Not on file   Food Insecurity: Not on file   Transportation Needs: Not on file   Physical Activity: Not on file   Stress: Not on file   Social Connections: Not on file   Intimate Partner Violence: Not on file   Housing Stability: Not on file     Family History  Family History   Problem Relation Age of Onset    Stroke Father     Hypertension Father     Parkinsonism Mother     Hypertension Mother     Dementia Mother     Diabetes Other         Maternal great aunt    Heart Attack Other         maternal great aunt    Heart Disease Other         maternal great aunt    Drug Abuse Son         opioid Medications:  Current Outpatient Medications on File Prior to Visit   Medication Sig Dispense Refill    pravastatin (PRAVACHOL) 10 mg tablet TAKE 1 TABLET BY MOUTH EVERY NIGHT 90 Tablet 0    estradioL (ESTRACE) 1 mg tablet TAKE 1 TABLET BY MOUTH EVERY DAY 90 Tablet 2    olmesartan (BENICAR) 40 mg tablet Take 1 Tablet by mouth daily. 90 Tablet 1    escitalopram oxalate (LEXAPRO) 10 mg tablet Take 1 Tablet by mouth daily. 90 Tablet 0    nystatin (MYCOSTATIN) topical cream Apply  to affected area two (2) times a day. As needed for flares 45 g 1    nystatin (MYCOSTATIN) powder Apply  to affected area two (2) times daily as needed (yeast infection). 60 g 0    FOLIC ACID/MULTIVIT-MIN/LUTEIN (CENTRUM SILVER PO) Take  by mouth. Takes one po once daily. coenzyme q10 300 mg cap Take 300 mg by mouth daily. DOCOSAHEXANOIC ACID/EPA (FISH OIL PO) Take 1,200 mg by mouth daily. estradioL (ESTRACE) 0.01 % (0.1 mg/gram) vaginal cream Insert 1 g into vagina two (2) times a week. SUN and WED      ascorbic acid, vitamin C, (VITAMIN C) 500 mg tablet Take 250 mg by mouth daily. No current facility-administered medications on file prior to visit. Allergies: Allergies   Allergen Reactions    Simvastatin Myalgia    Amlodipine Other (comments)     lightheaded    Codeine Other (comments)     Unable to function    Lidocaine Hives       ROS:  Negative except for that mentioned above.     OBJECTIVE:    PHYSICAL EXAM  VITAL SIGNS: Visit Vitals  BP (!) 168/82 (BP 1 Location: Left upper arm, BP Patient Position: Sitting)   Pulse 71   Ht 5' 4.76\" (1.645 m)   Wt 171 lb (77.6 kg)   BMI 28.66 kg/m²        GENERAL ZOILA: in no apparent distress and well developed and well nourished   HEENT: within normal limits   RESPIRATORY: lungs clear to auscultation and percussion   CARDIOVASC: Regular rate and rhythm without [facility]   GASTROINT: soft, non-tender, without masses or organomegaly   MUSCULOSKEL: no joint tenderness, deformity or swelling   EXTREMITIES: extremities normal, atraumatic, no cyanosis or edema   PELVIC: Normal external genitalia. Normal vagina. Uterus, cervix, and adnexa surgically absent. No masses or nodularity. RECTAL: Exam deferred. JAH SURVEY: Cervical, supraclavicular, and axillary nodes normal.   NEURO: Grossly normal       Lab Results   Component Value Date/Time    WBC 9.6 08/17/2021 09:03 AM    HGB 14.1 08/17/2021 09:03 AM    HCT 42.0 08/17/2021 09:03 AM    PLATELET 514 29/90/7473 09:03 AM    MCV 91.9 08/17/2021 09:03 AM     Lab Results   Component Value Date/Time    Sodium 136 08/17/2021 09:03 AM    Potassium 4.7 08/17/2021 09:03 AM    Chloride 104 08/17/2021 09:03 AM    CO2 28 08/17/2021 09:03 AM    Anion gap 4 (L) 08/17/2021 09:03 AM    Glucose 82 08/17/2021 09:03 AM    BUN 12 08/17/2021 09:03 AM    Creatinine 0.59 08/17/2021 09:03 AM    BUN/Creatinine ratio 20 08/17/2021 09:03 AM    GFR est AA >60 08/17/2021 09:03 AM    GFR est non-AA >60 08/17/2021 09:03 AM    Calcium 9.3 08/17/2021 09:03 AM       CT of abdomen/pelvis (7/16/15)  Limited images of the lung bases demonstrate a 3 mm nodule in the right    lower lobe (series 3, image 6) that is stable dating back to least a    December 2009. There is no new lung nodule, mass, or consolidation. The    heart size is normal. There is no pericardial or pleural effusion. The liver, spleen, pancreas, and adrenal glands are normal. The kidneys are    symmetric without hydronephrosis. The gall bladder is surgically absent    without intra- or extra-hepatic biliary dilatation. There are no dilated bowel loops. The appendix is normal.  There are no    enlarged lymph nodes. There is no free fluid or free air. The urinary bladder is normal.  There is no pelvic mass. The bony    structures are age-appropriate. There is no aggressive blastic or lytic    osseous lesion.         IMPRESSION:    Stable exam with no evidence for disease recurrence or metastatic disease in    the abdomen or pelvis. IMPRESSION AND PLAN:  Petey Kelly has a diagnosis of stage IC, grade 3, ovarian CA (clear cell), managed with surgical resection followed by adjuvant Taxol and Carboplatin chemotherapy. She has no evidence of disease based upon today's examination. I will plan to see her back in one year for continued surveillance. An electronic signature was used to sign this note.     Eleanor Alejandro MD  08/02/22

## 2022-08-08 DIAGNOSIS — F41.9 ANXIETY: ICD-10-CM

## 2022-08-10 DIAGNOSIS — I10 ESSENTIAL HYPERTENSION: Primary | ICD-10-CM

## 2022-08-10 DIAGNOSIS — Z00.00 MEDICARE ANNUAL WELLNESS VISIT, SUBSEQUENT: ICD-10-CM

## 2022-08-10 DIAGNOSIS — E78.2 MIXED HYPERLIPIDEMIA: ICD-10-CM

## 2022-08-11 RX ORDER — ESCITALOPRAM OXALATE 10 MG/1
TABLET ORAL
Qty: 90 TABLET | Refills: 0 | Status: SHIPPED | OUTPATIENT
Start: 2022-08-11 | End: 2022-08-30 | Stop reason: SDUPTHER

## 2022-08-11 NOTE — TELEPHONE ENCOUNTER
----- Message from [address]. Stocks sent at 8/11/2022 10:08 AM EDT -----  Regarding: My prescription for Escitalopram has not been approved for [facility] to refill for 90 days. I took my last pill yesterday. Please refill if possible today. Thank you.

## 2022-08-24 NOTE — ACP (ADVANCE CARE PLANNING)
Reviewed current ACP on file and no changes have been made. Is There Documentation In The Chart Showing Discussion Of Changes With Another Physician?: Please Select the Appropriate Response

## 2022-08-30 ENCOUNTER — OFFICE VISIT (OUTPATIENT)
Dept: INTERNAL MEDICINE CLINIC | Age: 77
End: 2022-08-30
Payer: MEDICARE

## 2022-08-30 VITALS
SYSTOLIC BLOOD PRESSURE: 136 MMHG | HEART RATE: 64 BPM | BODY MASS INDEX: 28.89 KG/M2 | DIASTOLIC BLOOD PRESSURE: 64 MMHG | WEIGHT: 173.4 LBS | OXYGEN SATURATION: 96 % | HEIGHT: 65 IN | RESPIRATION RATE: 16 BRPM | TEMPERATURE: 97.1 F

## 2022-08-30 DIAGNOSIS — Z12.31 VISIT FOR SCREENING MAMMOGRAM: ICD-10-CM

## 2022-08-30 DIAGNOSIS — E78.2 MIXED HYPERLIPIDEMIA: ICD-10-CM

## 2022-08-30 DIAGNOSIS — Z85.43 HISTORY OF MALIGNANT NEOPLASM OF OVARY: ICD-10-CM

## 2022-08-30 DIAGNOSIS — M70.61 TROCHANTERIC BURSITIS OF RIGHT HIP: ICD-10-CM

## 2022-08-30 DIAGNOSIS — Z78.0 POSTMENOPAUSAL: ICD-10-CM

## 2022-08-30 DIAGNOSIS — F41.9 ANXIETY: ICD-10-CM

## 2022-08-30 DIAGNOSIS — I10 ESSENTIAL HYPERTENSION: ICD-10-CM

## 2022-08-30 DIAGNOSIS — Z00.00 MEDICARE ANNUAL WELLNESS VISIT, SUBSEQUENT: Primary | ICD-10-CM

## 2022-08-30 PROCEDURE — 1090F PRES/ABSN URINE INCON ASSESS: CPT | Performed by: INTERNAL MEDICINE

## 2022-08-30 PROCEDURE — 99214 OFFICE O/P EST MOD 30 MIN: CPT | Performed by: INTERNAL MEDICINE

## 2022-08-30 PROCEDURE — G8752 SYS BP LESS 140: HCPCS | Performed by: INTERNAL MEDICINE

## 2022-08-30 PROCEDURE — G8510 SCR DEP NEG, NO PLAN REQD: HCPCS | Performed by: INTERNAL MEDICINE

## 2022-08-30 PROCEDURE — G8399 PT W/DXA RESULTS DOCUMENT: HCPCS | Performed by: INTERNAL MEDICINE

## 2022-08-30 PROCEDURE — G8754 DIAS BP LESS 90: HCPCS | Performed by: INTERNAL MEDICINE

## 2022-08-30 PROCEDURE — G0463 HOSPITAL OUTPT CLINIC VISIT: HCPCS | Performed by: INTERNAL MEDICINE

## 2022-08-30 PROCEDURE — G8536 NO DOC ELDER MAL SCRN: HCPCS | Performed by: INTERNAL MEDICINE

## 2022-08-30 PROCEDURE — 3017F COLORECTAL CA SCREEN DOC REV: CPT | Performed by: INTERNAL MEDICINE

## 2022-08-30 PROCEDURE — G8417 CALC BMI ABV UP PARAM F/U: HCPCS | Performed by: INTERNAL MEDICINE

## 2022-08-30 PROCEDURE — G8427 DOCREV CUR MEDS BY ELIG CLIN: HCPCS | Performed by: INTERNAL MEDICINE

## 2022-08-30 PROCEDURE — G9899 SCRN MAM PERF RSLTS DOC: HCPCS | Performed by: INTERNAL MEDICINE

## 2022-08-30 PROCEDURE — G0439 PPPS, SUBSEQ VISIT: HCPCS | Performed by: INTERNAL MEDICINE

## 2022-08-30 PROCEDURE — 1101F PT FALLS ASSESS-DOCD LE1/YR: CPT | Performed by: INTERNAL MEDICINE

## 2022-08-30 RX ORDER — ESCITALOPRAM OXALATE 10 MG/1
10 TABLET ORAL DAILY
Qty: 90 TABLET | Refills: 1 | Status: SHIPPED | OUTPATIENT
Start: 2022-08-30

## 2022-08-30 RX ORDER — OLMESARTAN MEDOXOMIL 40 MG/1
40 TABLET ORAL DAILY
Qty: 90 TABLET | Refills: 1 | Status: SHIPPED | OUTPATIENT
Start: 2022-08-30

## 2022-08-30 NOTE — PROGRESS NOTES
This is the Subsequent Medicare Annual Wellness Exam, performed 12 months or more after the Initial AWV or the last Subsequent AWV    I have reviewed the patient's medical history in detail and updated the computerized patient record. Assessment/Plan   Education and counseling provided:  Are appropriate based on today's review and evaluation    1. Medicare annual wellness visit, subsequent     Depression Risk Factor Screening     3 most recent PHQ Screens 8/30/2022   Little interest or pleasure in doing things Not at all   Feeling down, depressed, irritable, or hopeless Not at all   Total Score PHQ 2 0   Trouble falling or staying asleep, or sleeping too much -   Feeling tired or having little energy -   Poor appetite, weight loss, or overeating -   Feeling bad about yourself - or that you are a failure or have let yourself or your family down -   Trouble concentrating on things such as school, work, reading, or watching TV -   Moving or speaking so slowly that other people could have noticed; or the opposite being so fidgety that others notice -   Thoughts of being better off dead, or hurting yourself in some way -   PHQ 9 Score -       Alcohol & Drug Abuse Risk Screen    Do you average more than 1 drink per night or more than 7 drinks a week:  No    On any one occasion in the past three months have you have had more than 3 drinks containing alcohol:  No          Functional Ability and Level of Safety    Hearing: Hearing is good. Activities of Daily Living: The home contains: no safety equipment. Patient does total self care      Ambulation: with no difficulty     Fall Risk:  Fall Risk Assessment, last 12 mths 8/30/2022   Able to walk? Yes   Fall in past 12 months? 0   Do you feel unsteady?  0   Are you worried about falling 0      Abuse Screen:  Patient is not abused       Cognitive Screening    Has your family/caregiver stated any concerns about your memory: no     Cognitive Screening: MMSE 29/30    Health Maintenance Due   There are no preventive care reminders to display for this patient. Patient Care Team   Patient Care Team:  Dakota Brasher MD as PCP - General (Internal Medicine Physician)  Dakota Brasher MD as PCP - [facility]  Barbette Bence, MD (Gynecologic Oncology)    History     Patient Active Problem List   Diagnosis Code    History of malignant neoplasm of ovary Z85.43    Essential hypertension I10    Mixed hyperlipidemia E78.2     Past Medical History:   Diagnosis Date    Adopted     Adverse effect of anesthesia     tremors and shaking after anesthesia/\"taks silly\"    BRCA negative 5/2013    Dr. Shelton Dye    Cervical arthritis     History of ovarian cancer 2013    surgery/chemotherapy    Hypercholesterolemia     Hypertension     Ill-defined condition     hx passing out from dehydration    Other anxiety states     Other ill-defined conditions(799.89) 2010    BOWEL OBSTRUCTION  ? IMPACTION    Ovarian cancer ([facility] [address] 7/14/2016    Unspecified adverse effect of anesthesia     TREMORS AFTERWARDS      Past Surgical History:   Procedure Laterality Date    COLONOSCOPY N/A 8/2/2017    COLONOSCOPY performed by Pat Brown MD at [facility] ENDOSCOPY    HX CHOLECYSTECTOMY  2000    HX DILATION AND CURETTAGE      HX GYN  1/11/13    RSO    HX GYN  2/13/13    TLH/LSO, omentectomy, lymph node dissection    HX TONSILLECTOMY      AS CHILD    HX TUBAL LIGATION  1978    HX VASCULAR ACCESS      placed and removed    HX WISDOM TEETH EXTRACTION       Current Outpatient Medications   Medication Sig Dispense Refill    escitalopram oxalate (LEXAPRO) 10 mg tablet TAKE ONE TABLET BY MOUTH DAILY 90 Tablet 0    pravastatin (PRAVACHOL) 10 mg tablet TAKE 1 TABLET BY MOUTH EVERY NIGHT 90 Tablet 0    estradioL (ESTRACE) 1 mg tablet TAKE 1 TABLET BY MOUTH EVERY DAY 90 Tablet 2    olmesartan (BENICAR) 40 mg tablet Take 1 Tablet by mouth daily.  90 Tablet 1    nystatin (MYCOSTATIN) topical cream Apply  to affected area two (2) times a day. As needed for flares 45 g 1    nystatin (MYCOSTATIN) powder Apply  to affected area two (2) times daily as needed (yeast infection). 60 g 0    FOLIC ACID/MULTIVIT-MIN/LUTEIN (CENTRUM SILVER PO) Take  by mouth. Takes one po once daily. coenzyme q10 300 mg cap Take 300 mg by mouth daily. DOCOSAHEXANOIC ACID/EPA (FISH OIL PO) Take 1,200 mg by mouth daily. estradioL (ESTRACE) 0.01 % (0.1 mg/gram) vaginal cream Insert 1 g into vagina two (2) times a week. SUN and WED      ascorbic acid, vitamin C, (VITAMIN C) 500 mg tablet Take 250 mg by mouth daily. Allergies   Allergen Reactions    Simvastatin Myalgia    Amlodipine Other (comments)     lightheaded    Codeine Other (comments)     Unable to function    Lidocaine Hives       Family History   Problem Relation Age of Onset    Stroke Father     Hypertension Father     Parkinsonism Mother     Hypertension Mother     Dementia Mother     Diabetes Other         Maternal great aunt    Heart Attack Other         maternal great aunt    Heart Disease Other         maternal great aunt    Drug Abuse Son         opioid     Social History     Tobacco Use    Smoking status: Never    Smokeless tobacco: Never   Substance Use Topics    Alcohol use: Yes     Alcohol/week: 10.0 standard drinks     Types: 10 Glasses of wine per week     Comment: 1-2 glasses nightly         Willie Wagner MD     Austin Benton is a 78 y.o. female who was also seen today for a follow-up visit. Assessment & Plan:   1. Medicare annual wellness visit, subsequent  Health maintenance reviewed and updated with patient today at visit. 2. Essential hypertension  Assessment & Plan:   well controlled, continue current medications  Orders:  -     olmesartan (BENICAR) 40 mg tablet; Take 1 Tablet by mouth daily. , Normal, Disp-90 Tablet, R-1  3.  Anxiety  Assessment & Plan:   well controlled, continue current medications  Orders:  -     escitalopram oxalate (LEXAPRO) 10 mg tablet; Take 1 Tablet by mouth daily. , Normal, Disp-90 Tablet, R-1  4. Mixed hyperlipidemia  Assessment & Plan:   well controlled, continue current medications  5. History of malignant neoplasm of ovary  Assessment & Plan:   monitored by specialist. No acute findings meriting change in the plan  6. Trochanteric bursitis of right hip intermittent with nl exam today  Counseled and given h/o and exercises. 7. Visit for screening mammogram  -     [facility] 3D MELVA W MAMMO BI SCREENING INCL CAD; Future  8. Postmenopausal  -     DEXA BONE DENSITY STUDY AXIAL; Future . follow up 6 months. Subjective:   Vandana Hendricks was seen for HTN, hypercholesterolemia, anxiety, and hx of ovarian cancer. -130's/60's. Taking medication with no side effects. Exercise: walking Diet low salt and trying to cut sugar and carbs. Anxiety has been controlled. Lab work is reviewed with her today. Review of Systems   Constitutional:  Negative for fever, malaise/fatigue and weight loss. HENT:  Negative for congestion and sore throat. Eyes: Negative. Respiratory:  Negative for cough and shortness of breath. Cardiovascular:  Negative for chest pain and leg swelling. Gastrointestinal:  Negative for abdominal pain, blood in stool, constipation, diarrhea, heartburn and nausea. Genitourinary:  Negative for dysuria, frequency and urgency. Musculoskeletal:  Positive for joint pain (right outer hip when lays on that side). Negative for back pain. Skin:  Negative for rash. Neurological:  Negative for dizziness, sensory change, focal weakness and headaches. Psychiatric/Behavioral:  Negative for depression.   - per HPI    Physical Exam  Vitals and nursing note reviewed. Constitutional:       General: She is not in acute distress. Appearance: She is well-developed. HENT:      Head: Normocephalic and atraumatic.       Right Ear: Tympanic membrane and ear canal normal.      Left Ear: Tympanic membrane and ear canal normal.      Nose: Nose normal.      Mouth/Throat:      Mouth: Mucous membranes are moist.      Pharynx: No posterior oropharyngeal erythema. Eyes:      Extraocular Movements: Extraocular movements intact. Conjunctiva/sclera: Conjunctivae normal.      Pupils: Pupils are equal, round, and reactive to light. Neck:      Thyroid: No thyromegaly. Cardiovascular:      Rate and Rhythm: Normal rate and regular rhythm. Heart sounds: Normal heart sounds. No murmur heard. Pulmonary:      Effort: Pulmonary effort is normal.      Breath sounds: Normal breath sounds. Chest:      Comments: Breast exam: breasts appear normal, no suspicious masses, no skin or nipple changes or axillary nodes     Abdominal:      General: Bowel sounds are normal.      Palpations: Abdomen is soft. There is no mass. Tenderness: There is no abdominal tenderness. Musculoskeletal:      Cervical back: Normal range of motion. Right hip: No tenderness. Normal range of motion. Left hip: No tenderness. Normal range of motion. Right lower leg: No edema. Left lower leg: No edema. Lymphadenopathy:      Cervical: No cervical adenopathy. Skin:     Findings: No rash. Neurological:      Cranial Nerves: No cranial nerve deficit. Sensory: No sensory deficit. Comments: MMSE 29/30   Psychiatric:         Mood and Affect: Mood normal.     Visit Vitals  /64   Pulse 64   Temp 97.1 °F (36.2 °C) (Temporal)   Resp 16   Ht 5' 4.76\" (1.645 m)   Wt 173 lb 6.4 oz (78.7 kg)   SpO2 96%   BMI 29.07 kg/m²        Aspects of this note may have been generated using voice recognition software. Despite editing, there may be some syntax errors   I have discussed the diagnosis with the patient and the intended plan as seen in the above orders. The patient has received an after-visit summary and questions were answered concerning future plans.   I have discussed any recommended medication side effects and warnings with the patient as well.   She has expressed understanding of the diagnosis and plan    Tiburcio Garcia MD

## 2022-08-30 NOTE — PATIENT INSTRUCTIONS
Medicare Wellness Visit, Female     The best way to live healthy is to have a lifestyle where you eat a well-balanced diet, exercise regularly, limit alcohol use, and quit all forms of tobacco/nicotine, if applicable. Regular preventive services are another way to keep healthy. Preventive services (vaccines, screening tests, monitoring & exams) can help personalize your care plan, which helps you manage your own care. Screening tests can find health problems at the earliest stages, when they are easiest to treat. [facility] follows the current, evidence-based guidelines published by the [facility] Will ([facility]) when recommending preventive services for our patients. Because we follow these guidelines, sometimes recommendations change over time as research supports it. (For example, mammograms used to be recommended annually. Even though Medicare will still pay for an annual mammogram, the newer guidelines recommend a mammogram every two years for women of average risk). Of course, you and your doctor may decide to screen more often for some diseases, based on your risk and your co-morbidities (chronic disease you are already diagnosed with). Preventive services for you include:  - Medicare offers their members a free annual wellness visit, which is time for you and your primary care provider to discuss and plan for your preventive service needs. Take advantage of this benefit every year!  -All adults over the age of 72 should receive the recommended pneumonia vaccines. Current USPSTF guidelines recommend a series of two vaccines for the best pneumonia protection.   -All adults should have a flu vaccine yearly and a tetanus vaccine every 10 years.   -All adults age 48 and older should receive the shingles vaccines (series of two vaccines).       -All adults age 38-68 who are overweight should have a diabetes screening test once every three years.   -All adults born between [birth year] and 1965 should be screened once for Hepatitis C.  -Other screening tests and preventive services for persons with diabetes include: an eye exam to screen for diabetic retinopathy, a kidney function test, a foot exam, and stricter control over your cholesterol.   -Cardiovascular screening for adults with routine risk involves an electrocardiogram (ECG) at intervals determined by your doctor.   -Colorectal cancer screenings should be done for adults age 54-65 with no increased risk factors for colorectal cancer. There are a number of acceptable methods of screening for this type of cancer. Each test has its own benefits and drawbacks. Discuss with your doctor what is most appropriate for you during your annual wellness visit. The different tests include: colonoscopy (considered the best screening method), a fecal occult blood test, a fecal DNA test, and sigmoidoscopy.    -A bone mass density test is recommended when a woman turns 65 to screen for osteoporosis. This test is only recommended one time, as a screening. Some providers will use this same test as a disease monitoring tool if you already have osteoporosis. -Breast cancer screenings are recommended every other year for women of normal risk, age 54-69.  -Cervical cancer screenings for women over age 72 are only recommended with certain risk factors. Here is a list of your current Health Maintenance items (your personalized list of preventive services) with a due date: There are no preventive care reminders to display for this patient.

## 2022-10-07 ENCOUNTER — HOSPITAL ENCOUNTER (OUTPATIENT)
Dept: MAMMOGRAPHY | Age: 77
Discharge: HOME | End: 2022-10-07
Attending: INTERNAL MEDICINE
Payer: MEDICARE

## 2022-10-07 DIAGNOSIS — Z78.0 POSTMENOPAUSAL: ICD-10-CM

## 2022-10-07 DIAGNOSIS — Z12.31 VISIT FOR SCREENING MAMMOGRAM: ICD-10-CM

## 2022-10-07 PROCEDURE — 77080 DXA BONE DENSITY AXIAL: CPT

## 2022-10-07 PROCEDURE — 77063 BREAST TOMOSYNTHESIS BI: CPT

## 2023-02-24 ENCOUNTER — TELEPHONE (OUTPATIENT)
Dept: INTERNAL MEDICINE CLINIC | Age: 78
End: 2023-02-24

## 2023-02-24 NOTE — TELEPHONE ENCOUNTER
Spoke with Patient and explained that Dr. Rebeka Cardoza will be out of office for the next 12 weeks due to an unexpected orthopedic surgery. Therefore, he appt on 2/28/23 will need to be canceled and rescheduled. Patient rescheduled for Wed, 5/24/23 @ 10:45 am with Dr. Rebkea Cardoza.

## 2023-05-19 RX ORDER — ESTRADIOL 1 MG/1
TABLET ORAL
Qty: 90 TABLET | Refills: 1 | Status: SHIPPED | OUTPATIENT
Start: 2023-05-19

## 2023-05-24 ENCOUNTER — OFFICE VISIT (OUTPATIENT)
Age: 78
End: 2023-05-24
Payer: MEDICARE

## 2023-05-24 VITALS
TEMPERATURE: 97.5 F | DIASTOLIC BLOOD PRESSURE: 78 MMHG | OXYGEN SATURATION: 96 % | RESPIRATION RATE: 16 BRPM | SYSTOLIC BLOOD PRESSURE: 134 MMHG | BODY MASS INDEX: 28.09 KG/M2 | HEART RATE: 66 BPM | WEIGHT: 168.6 LBS | HEIGHT: 65 IN

## 2023-05-24 DIAGNOSIS — I10 ESSENTIAL HYPERTENSION: Primary | ICD-10-CM

## 2023-05-24 DIAGNOSIS — E78.2 MIXED HYPERLIPIDEMIA: ICD-10-CM

## 2023-05-24 DIAGNOSIS — Z85.43 HISTORY OF MALIGNANT NEOPLASM OF OVARY: ICD-10-CM

## 2023-05-24 DIAGNOSIS — F41.9 ANXIETY: ICD-10-CM

## 2023-05-24 PROBLEM — C56.1 MALIGNANT NEOPLASM OF RIGHT OVARY (HCC): Status: ACTIVE | Noted: 2023-05-24

## 2023-05-24 PROBLEM — C56.1 MALIGNANT NEOPLASM OF RIGHT OVARY (HCC): Status: RESOLVED | Noted: 2023-05-24 | Resolved: 2023-05-24

## 2023-05-24 PROCEDURE — 3075F SYST BP GE 130 - 139MM HG: CPT | Performed by: INTERNAL MEDICINE

## 2023-05-24 PROCEDURE — 1090F PRES/ABSN URINE INCON ASSESS: CPT | Performed by: INTERNAL MEDICINE

## 2023-05-24 PROCEDURE — G8427 DOCREV CUR MEDS BY ELIG CLIN: HCPCS | Performed by: INTERNAL MEDICINE

## 2023-05-24 PROCEDURE — G8419 CALC BMI OUT NRM PARAM NOF/U: HCPCS | Performed by: INTERNAL MEDICINE

## 2023-05-24 PROCEDURE — 1036F TOBACCO NON-USER: CPT | Performed by: INTERNAL MEDICINE

## 2023-05-24 PROCEDURE — 99214 OFFICE O/P EST MOD 30 MIN: CPT | Performed by: INTERNAL MEDICINE

## 2023-05-24 PROCEDURE — 3017F COLORECTAL CA SCREEN DOC REV: CPT | Performed by: INTERNAL MEDICINE

## 2023-05-24 PROCEDURE — 1123F ACP DISCUSS/DSCN MKR DOCD: CPT | Performed by: INTERNAL MEDICINE

## 2023-05-24 PROCEDURE — 3078F DIAST BP <80 MM HG: CPT | Performed by: INTERNAL MEDICINE

## 2023-05-24 PROCEDURE — G8399 PT W/DXA RESULTS DOCUMENT: HCPCS | Performed by: INTERNAL MEDICINE

## 2023-05-24 RX ORDER — AMLODIPINE BESYLATE 5 MG/1
5 TABLET ORAL
COMMUNITY
Start: 2021-08-26 | End: 2023-05-24 | Stop reason: SINTOL

## 2023-05-24 SDOH — ECONOMIC STABILITY: INCOME INSECURITY: HOW HARD IS IT FOR YOU TO PAY FOR THE VERY BASICS LIKE FOOD, HOUSING, MEDICAL CARE, AND HEATING?: NOT HARD AT ALL

## 2023-05-24 SDOH — ECONOMIC STABILITY: FOOD INSECURITY: WITHIN THE PAST 12 MONTHS, YOU WORRIED THAT YOUR FOOD WOULD RUN OUT BEFORE YOU GOT MONEY TO BUY MORE.: NEVER TRUE

## 2023-05-24 SDOH — ECONOMIC STABILITY: FOOD INSECURITY: WITHIN THE PAST 12 MONTHS, THE FOOD YOU BOUGHT JUST DIDN'T LAST AND YOU DIDN'T HAVE MONEY TO GET MORE.: NEVER TRUE

## 2023-05-24 SDOH — ECONOMIC STABILITY: HOUSING INSECURITY
IN THE LAST 12 MONTHS, WAS THERE A TIME WHEN YOU DID NOT HAVE A STEADY PLACE TO SLEEP OR SLEPT IN A SHELTER (INCLUDING NOW)?: NO

## 2023-05-24 ASSESSMENT — PATIENT HEALTH QUESTIONNAIRE - PHQ9
SUM OF ALL RESPONSES TO PHQ QUESTIONS 1-9: 0
1. LITTLE INTEREST OR PLEASURE IN DOING THINGS: 0
SUM OF ALL RESPONSES TO PHQ QUESTIONS 1-9: 0
SUM OF ALL RESPONSES TO PHQ9 QUESTIONS 1 & 2: 0
2. FEELING DOWN, DEPRESSED OR HOPELESS: 0

## 2023-05-24 ASSESSMENT — ENCOUNTER SYMPTOMS
ABDOMINAL PAIN: 0
SHORTNESS OF BREATH: 0

## 2023-05-24 NOTE — PROGRESS NOTES
Ramonita Tse is a 78 y.o. female who was seen in clinic today (2023). Assessment & Plan:   Below is the assessment and plan developed based on review of pertinent history, physical exam, labs, studies, and medications. 1. Essential hypertension  Assessment & Plan:   Well-controlled, continue current medications  Orders:  -     Comprehensive Metabolic Panel; Future  -     TSH; Future  -     Lipid Panel; Future  -     CBC with Auto Differential; Future  2. Anxiety  Assessment & Plan:   Well-controlled, continue current medications  3. Mixed hyperlipidemia  Assessment & Plan:   Unclear control, continue current medications pending work up below  4. History of malignant neoplasm of ovary  Assessment & Plan:   She is now 10 years out from her cancer diagnosis. Followed by Dr. Vinnie Marques in GYN. Keep September Medicare wellness visit. Subjective/Objective:   Harrison Wall was seen today for No chief complaint on file. follow up active medical problems and medication management. Patient Active Problem List   Diagnosis    Essential hypertension    History of malignant neoplasm of ovary    Mixed hyperlipidemia    Anxiety     Taking medication with no side effects. Exercise: walking Diet low salt but too much sugar. BP rangin's/80's. She has a lot of home stress with teenage grandson. Notes thinks might have had covid in March as had some loss of smell. This has resolved. Still has some fatigue. Current Outpatient Medications   Medication Sig Dispense Refill    vitamin D 25 MCG (1000 UT) CAPS Vitamin D3 25 mcg (1,000 unit) capsule   Take 2 capsules by oral route.       Omega-3 Fatty Acids (OMEGA-3 FISH OIL PO) Fish Oil 300 mg-1,000 mg capsule,delayed release   Prescribed by non [facility] MD      Multiple Vitamin (MULTIVITAMINS PO) multivitamin tablet   Prescribed by non [facility] MD      estradiol (ESTRACE) 1 MG tablet TAKE 1 TABLET BY MOUTH EVERY DAY 90 tablet 1    ascorbic acid (VITAMIN C) 500 MG tablet Take

## 2023-05-31 DIAGNOSIS — I10 ESSENTIAL HYPERTENSION: ICD-10-CM

## 2023-06-01 LAB
ALBUMIN SERPL-MCNC: 3.6 G/DL (ref 3.5–5)
ALBUMIN/GLOB SERPL: 1 (ref 1.1–2.2)
ALP SERPL-CCNC: 60 U/L (ref 45–117)
ALT SERPL-CCNC: 19 U/L (ref 12–78)
ANION GAP SERPL CALC-SCNC: 6 MMOL/L (ref 5–15)
AST SERPL-CCNC: 19 U/L (ref 15–37)
BASOPHILS # BLD: 0.1 K/UL (ref 0–0.1)
BASOPHILS NFR BLD: 1 % (ref 0–1)
BILIRUB SERPL-MCNC: 0.4 MG/DL (ref 0.2–1)
BUN SERPL-MCNC: 13 MG/DL (ref 6–20)
BUN/CREAT SERPL: 18 (ref 12–20)
CALCIUM SERPL-MCNC: 9.4 MG/DL (ref 8.5–10.1)
CHLORIDE SERPL-SCNC: 103 MMOL/L (ref 97–108)
CHOLEST SERPL-MCNC: 216 MG/DL
CO2 SERPL-SCNC: 29 MMOL/L (ref 21–32)
CREAT SERPL-MCNC: 0.74 MG/DL (ref 0.55–1.02)
DIFFERENTIAL METHOD BLD: NORMAL
EOSINOPHIL # BLD: 0.4 K/UL (ref 0–0.4)
EOSINOPHIL NFR BLD: 5 % (ref 0–7)
ERYTHROCYTE [DISTWIDTH] IN BLOOD BY AUTOMATED COUNT: 13.6 % (ref 11.5–14.5)
GLOBULIN SER CALC-MCNC: 3.5 G/DL (ref 2–4)
GLUCOSE SERPL-MCNC: 88 MG/DL (ref 65–100)
HCT VFR BLD AUTO: 42.3 % (ref 35–47)
HDLC SERPL-MCNC: 80 MG/DL
HDLC SERPL: 2.7 (ref 0–5)
HGB BLD-MCNC: 13.5 G/DL (ref 11.5–16)
IMM GRANULOCYTES # BLD AUTO: 0 K/UL (ref 0–0.04)
IMM GRANULOCYTES NFR BLD AUTO: 0 % (ref 0–0.5)
LDLC SERPL CALC-MCNC: 107.4 MG/DL (ref 0–100)
LYMPHOCYTES # BLD: 3 K/UL (ref 0.8–3.5)
LYMPHOCYTES NFR BLD: 34 % (ref 12–49)
MCH RBC QN AUTO: 30.3 PG (ref 26–34)
MCHC RBC AUTO-ENTMCNC: 31.9 G/DL (ref 30–36.5)
MCV RBC AUTO: 94.8 FL (ref 80–99)
MONOCYTES # BLD: 0.8 K/UL (ref 0–1)
MONOCYTES NFR BLD: 9 % (ref 5–13)
NEUTS SEG # BLD: 4.4 K/UL (ref 1.8–8)
NEUTS SEG NFR BLD: 51 % (ref 32–75)
NRBC # BLD: 0 K/UL (ref 0–0.01)
NRBC BLD-RTO: 0 PER 100 WBC
PLATELET # BLD AUTO: 353 K/UL (ref 150–400)
PMV BLD AUTO: 10.7 FL (ref 8.9–12.9)
POTASSIUM SERPL-SCNC: 5 MMOL/L (ref 3.5–5.1)
PROT SERPL-MCNC: 7.1 G/DL (ref 6.4–8.2)
RBC # BLD AUTO: 4.46 M/UL (ref 3.8–5.2)
SODIUM SERPL-SCNC: 138 MMOL/L (ref 136–145)
TRIGL SERPL-MCNC: 143 MG/DL
TSH SERPL DL<=0.05 MIU/L-ACNC: 1.34 UIU/ML (ref 0.36–3.74)
VLDLC SERPL CALC-MCNC: 28.6 MG/DL
WBC # BLD AUTO: 8.8 K/UL (ref 3.6–11)

## 2023-07-26 DIAGNOSIS — F41.9 ANXIETY: ICD-10-CM

## 2023-07-26 DIAGNOSIS — I10 ESSENTIAL HYPERTENSION: Primary | ICD-10-CM

## 2023-07-26 RX ORDER — OLMESARTAN MEDOXOMIL 40 MG/1
TABLET ORAL
Qty: 90 TABLET | Refills: 0 | Status: SHIPPED | OUTPATIENT
Start: 2023-07-26

## 2023-07-26 RX ORDER — ESCITALOPRAM OXALATE 10 MG/1
TABLET ORAL
Qty: 90 TABLET | Refills: 0 | Status: SHIPPED | OUTPATIENT
Start: 2023-07-26

## 2023-07-27 DIAGNOSIS — E78.2 MIXED HYPERLIPIDEMIA: Primary | ICD-10-CM

## 2023-07-27 RX ORDER — PRAVASTATIN SODIUM 10 MG
TABLET ORAL
Qty: 90 TABLET | Refills: 2 | Status: SHIPPED | OUTPATIENT
Start: 2023-07-27

## 2023-08-02 NOTE — PROGRESS NOTES
Check up for history of ovarian cancer. Pt states no abnormal spotting, bleeding or pain. 1. Have you been to the ER, urgent care clinic since your last visit? Hospitalized since your last visit?no    2. Have you seen or consulted any other health care providers outside of the [address] [place] [address] since your last visit? Include any pap smears or colon screening.  no

## 2023-08-03 ENCOUNTER — OFFICE VISIT (OUTPATIENT)
Age: 78
End: 2023-08-03
Payer: MEDICARE

## 2023-08-03 VITALS
SYSTOLIC BLOOD PRESSURE: 170 MMHG | BODY MASS INDEX: 27.82 KG/M2 | HEART RATE: 69 BPM | HEIGHT: 65 IN | DIASTOLIC BLOOD PRESSURE: 81 MMHG | WEIGHT: 167 LBS

## 2023-08-03 DIAGNOSIS — Z85.43 HISTORY OF OVARIAN CANCER: Primary | ICD-10-CM

## 2023-08-03 PROCEDURE — 99212 OFFICE O/P EST SF 10 MIN: CPT | Performed by: OBSTETRICS & GYNECOLOGY

## 2023-08-03 NOTE — PROGRESS NOTES
[address] [place] [address] Shelby Pineda  P [phone]  F [phone]    Office Note  Patient ID:  Name:  Addie Valles  MRN:  [id number]  :  1945/77 y.o. Date:  8/3/2023      HISTORY OF PRESENT ILLNESS:  Addie Valles is a 78 y.o.  female who is an established patient with a diagnosis of stage IC, grade 3, ovarian carcinoma (clear cell). She is s/p surgical resection in 2013 and completed 6 cycles of Taxol and Carboplatin chemotherapy. She tolerated chemotherapy quite well. Her post-treatment PET/CT in 2013 showed no evidence of residual or metastatic disease. She has been followed since that time without evidence of disease. She presents today for routine surveillance. She is doing well and is without complaints. She denies any vaginal bleeding or discharge, any pelvic or abdominal pain, or any changes in her bowel or bladder habits. Her most recent CA-125 in 2022 was normal.       Genetic Testing  Myriad panel negative      ROS:   and GI review:  Negative  Cardiopulmonary review:  Negative   Musculoskeletal:  Negative    A comprehensive review of systems was negative except for that written in the History of Present Illness. , 10 point ROS        Problem List:  Patient Active Problem List    Diagnosis Date Noted    Mixed hyperlipidemia 2019    Essential hypertension 2017    Anxiety 2013    History of malignant neoplasm of ovary 2013     PMH:  Past Medical History:   Diagnosis Date    Adopted     Adverse effect of anesthesia     tremors and shaking after anesthesia/\"taks silly\"    BRCA negative 2013    Dr. Radha Castañeda    Cervical arthritis     History of ovarian cancer     surgery/chemotherapy    Hypercholesterolemia     Hypertension     Ill-defined condition     hx passing out from dehydration    Malignant neoplasm of right ovary ([address]) 2023    Other anxiety states     Other ill-defined

## 2023-09-23 SDOH — HEALTH STABILITY: PHYSICAL HEALTH: ON AVERAGE, HOW MANY MINUTES DO YOU ENGAGE IN EXERCISE AT THIS LEVEL?: 20 MIN

## 2023-09-23 SDOH — HEALTH STABILITY: PHYSICAL HEALTH: ON AVERAGE, HOW MANY DAYS PER WEEK DO YOU ENGAGE IN MODERATE TO STRENUOUS EXERCISE (LIKE A BRISK WALK)?: 7 DAYS

## 2023-09-23 ASSESSMENT — LIFESTYLE VARIABLES
HOW MANY STANDARD DRINKS CONTAINING ALCOHOL DO YOU HAVE ON A TYPICAL DAY: 1 OR 2
HOW OFTEN DO YOU HAVE A DRINK CONTAINING ALCOHOL: 4
HOW MANY STANDARD DRINKS CONTAINING ALCOHOL DO YOU HAVE ON A TYPICAL DAY: 1
HOW OFTEN DO YOU HAVE A DRINK CONTAINING ALCOHOL: 2-3 TIMES A WEEK
HOW OFTEN DO YOU HAVE SIX OR MORE DRINKS ON ONE OCCASION: 1

## 2023-09-23 ASSESSMENT — PATIENT HEALTH QUESTIONNAIRE - PHQ9
1. LITTLE INTEREST OR PLEASURE IN DOING THINGS: 0
SUM OF ALL RESPONSES TO PHQ9 QUESTIONS 1 & 2: 0
SUM OF ALL RESPONSES TO PHQ QUESTIONS 1-9: 0
2. FEELING DOWN, DEPRESSED OR HOPELESS: 0
SUM OF ALL RESPONSES TO PHQ QUESTIONS 1-9: 0

## 2023-09-26 ENCOUNTER — OFFICE VISIT (OUTPATIENT)
Age: 78
End: 2023-09-26
Payer: MEDICARE

## 2023-09-26 VITALS
TEMPERATURE: 97.8 F | HEART RATE: 68 BPM | OXYGEN SATURATION: 96 % | SYSTOLIC BLOOD PRESSURE: 138 MMHG | WEIGHT: 179.6 LBS | HEIGHT: 65 IN | BODY MASS INDEX: 29.92 KG/M2 | DIASTOLIC BLOOD PRESSURE: 74 MMHG | RESPIRATION RATE: 16 BRPM

## 2023-09-26 DIAGNOSIS — Z85.43 HISTORY OF MALIGNANT NEOPLASM OF OVARY: ICD-10-CM

## 2023-09-26 DIAGNOSIS — E78.2 MIXED HYPERLIPIDEMIA: ICD-10-CM

## 2023-09-26 DIAGNOSIS — F41.9 ANXIETY: ICD-10-CM

## 2023-09-26 DIAGNOSIS — Z00.00 MEDICARE ANNUAL WELLNESS VISIT, SUBSEQUENT: Primary | ICD-10-CM

## 2023-09-26 DIAGNOSIS — I10 ESSENTIAL HYPERTENSION: ICD-10-CM

## 2023-09-26 PROCEDURE — 99214 OFFICE O/P EST MOD 30 MIN: CPT | Performed by: INTERNAL MEDICINE

## 2023-09-26 PROCEDURE — G8419 CALC BMI OUT NRM PARAM NOF/U: HCPCS | Performed by: INTERNAL MEDICINE

## 2023-09-26 PROCEDURE — 3017F COLORECTAL CA SCREEN DOC REV: CPT | Performed by: INTERNAL MEDICINE

## 2023-09-26 PROCEDURE — G8427 DOCREV CUR MEDS BY ELIG CLIN: HCPCS | Performed by: INTERNAL MEDICINE

## 2023-09-26 PROCEDURE — 1090F PRES/ABSN URINE INCON ASSESS: CPT | Performed by: INTERNAL MEDICINE

## 2023-09-26 PROCEDURE — 1123F ACP DISCUSS/DSCN MKR DOCD: CPT | Performed by: INTERNAL MEDICINE

## 2023-09-26 PROCEDURE — 1036F TOBACCO NON-USER: CPT | Performed by: INTERNAL MEDICINE

## 2023-09-26 PROCEDURE — G0439 PPPS, SUBSEQ VISIT: HCPCS | Performed by: INTERNAL MEDICINE

## 2023-09-26 PROCEDURE — 3078F DIAST BP <80 MM HG: CPT | Performed by: INTERNAL MEDICINE

## 2023-09-26 PROCEDURE — G8399 PT W/DXA RESULTS DOCUMENT: HCPCS | Performed by: INTERNAL MEDICINE

## 2023-09-26 PROCEDURE — 3075F SYST BP GE 130 - 139MM HG: CPT | Performed by: INTERNAL MEDICINE

## 2023-09-26 RX ORDER — OLMESARTAN MEDOXOMIL 40 MG/1
40 TABLET ORAL DAILY
Qty: 90 TABLET | Refills: 1 | Status: SHIPPED | OUTPATIENT
Start: 2023-09-26

## 2023-09-26 RX ORDER — ESCITALOPRAM OXALATE 10 MG/1
10 TABLET ORAL DAILY
Qty: 90 TABLET | Refills: 1 | Status: SHIPPED | OUTPATIENT
Start: 2023-09-26

## 2023-09-26 RX ORDER — PRAVASTATIN SODIUM 10 MG
10 TABLET ORAL NIGHTLY
Qty: 90 TABLET | Refills: 2 | Status: SHIPPED | OUTPATIENT
Start: 2023-09-26

## 2023-09-26 ASSESSMENT — ENCOUNTER SYMPTOMS
SHORTNESS OF BREATH: 0
NAUSEA: 0
SORE THROAT: 0
CONSTIPATION: 0
COUGH: 0
DIARRHEA: 0
BLOOD IN STOOL: 0
BACK PAIN: 0
ABDOMINAL PAIN: 0

## 2023-09-26 NOTE — ASSESSMENT & PLAN NOTE
Not as well-controlled due to recent stressors from her sons mental health and alchohol issues. continue current Lexapro. Recommend counseling but she declines that at this time. She will stay in touch if her symptoms worsen.

## 2023-09-26 NOTE — PROGRESS NOTES
Medicare Annual Wellness Visit    Maynor Rosado is here for Medicare AWV    Assessment & Plan   Medicare annual wellness visit, subsequent  Health maintenance reviewed and updated with patient today at visit. Anxiety  Assessment & Plan:  Not as well-controlled due to recent stressors from her sons mental health and alchohol issues. continue current Lexapro. Recommend counseling but she declines that at this time. She will stay in touch if her symptoms worsen. Orders:  -     escitalopram (LEXAPRO) 10 MG tablet; Take 1 tablet by mouth daily, Disp-90 tablet, R-1Normal  History of malignant neoplasm of ovary  Assessment & Plan:   Monitored by specialist- no acute findings meriting change in the plan  Essential hypertension  Assessment & Plan:   Borderline controlled, continue current medications    Orders:  -     olmesartan (BENICAR) 40 MG tablet; Take 1 tablet by mouth daily, Disp-90 tablet, R-1Normal  Mixed hyperlipidemia  Assessment & Plan:   Borderline controlled, continue current medications continue lifestyle changes. Orders:  -     pravastatin (PRAVACHOL) 10 MG tablet; Take 1 tablet by mouth nightly, Disp-90 tablet, R-2Normal    Recommendations for Preventive Services Due: see orders and patient instructions/AVS.  Recommended screening schedule for the next 5-10 years is provided to the patient in written form: see Patient Instructions/AVS.     Return in about 6 months (around 3/26/2024). Subjective   The following acute and/or chronic problems were also addressed today:  Patient Active Problem List   Diagnosis    Essential hypertension    History of malignant neoplasm of ovary    Mixed hyperlipidemia    Anxiety    Taking medication with no side effects. Exercise: walkig Diet low salt healthy  Home BP: 130's/70's     H/A since flu shot that was just done. Patient's complete Health Risk Assessment and screening values have been reviewed and are found in Flowsheets.  The following problems were

## 2023-12-08 ENCOUNTER — TELEPHONE (OUTPATIENT)
Age: 78
End: 2023-12-08

## 2023-12-08 NOTE — TELEPHONE ENCOUNTER
Disregard note pt has rescheduled appt and is seeing BSD on 12/12/23    Reason for call:  pt lost her son in October and is losing her hair and has bumps all over her scalp is sore and painful. She has an appt for 01/11/24.   She only wants to see Dr Shala Plunkett please call and advise      Is this a new problem: Yes    Date of last appointment:  9/26/2023     Can we respond via [facility]: No    Best call back number:     Kay Grossman (Self) [phone] (Home)

## 2023-12-12 ENCOUNTER — OFFICE VISIT (OUTPATIENT)
Age: 78
End: 2023-12-12
Payer: MEDICARE

## 2023-12-12 VITALS
TEMPERATURE: 97.6 F | HEIGHT: 65 IN | WEIGHT: 170 LBS | SYSTOLIC BLOOD PRESSURE: 160 MMHG | OXYGEN SATURATION: 97 % | DIASTOLIC BLOOD PRESSURE: 80 MMHG | BODY MASS INDEX: 28.32 KG/M2 | RESPIRATION RATE: 16 BRPM | HEART RATE: 75 BPM

## 2023-12-12 DIAGNOSIS — L65.9 HAIR THINNING: ICD-10-CM

## 2023-12-12 DIAGNOSIS — F43.21 GRIEF: Primary | ICD-10-CM

## 2023-12-12 PROCEDURE — 1090F PRES/ABSN URINE INCON ASSESS: CPT | Performed by: INTERNAL MEDICINE

## 2023-12-12 PROCEDURE — G8399 PT W/DXA RESULTS DOCUMENT: HCPCS | Performed by: INTERNAL MEDICINE

## 2023-12-12 PROCEDURE — 1123F ACP DISCUSS/DSCN MKR DOCD: CPT | Performed by: INTERNAL MEDICINE

## 2023-12-12 PROCEDURE — 3079F DIAST BP 80-89 MM HG: CPT | Performed by: INTERNAL MEDICINE

## 2023-12-12 PROCEDURE — 3077F SYST BP >= 140 MM HG: CPT | Performed by: INTERNAL MEDICINE

## 2023-12-12 PROCEDURE — 1036F TOBACCO NON-USER: CPT | Performed by: INTERNAL MEDICINE

## 2023-12-12 PROCEDURE — 99213 OFFICE O/P EST LOW 20 MIN: CPT | Performed by: INTERNAL MEDICINE

## 2023-12-12 PROCEDURE — G8428 CUR MEDS NOT DOCUMENT: HCPCS | Performed by: INTERNAL MEDICINE

## 2023-12-12 PROCEDURE — G8484 FLU IMMUNIZE NO ADMIN: HCPCS | Performed by: INTERNAL MEDICINE

## 2023-12-12 PROCEDURE — G8417 CALC BMI ABV UP PARAM F/U: HCPCS | Performed by: INTERNAL MEDICINE

## 2023-12-12 RX ORDER — ESCITALOPRAM OXALATE 20 MG/1
20 TABLET ORAL DAILY
Qty: 30 TABLET | Refills: 0 | Status: SHIPPED | OUTPATIENT
Start: 2023-12-12

## 2024-01-11 RX ORDER — ESCITALOPRAM OXALATE 20 MG/1
20 TABLET ORAL DAILY
Qty: 30 TABLET | Refills: 2 | Status: SHIPPED | OUTPATIENT
Start: 2024-01-11

## 2024-02-12 RX ORDER — ESTRADIOL 1 MG/1
1 TABLET ORAL DAILY
Qty: 90 TABLET | Refills: 1 | Status: SHIPPED | OUTPATIENT
Start: 2024-02-12

## 2024-04-09 ENCOUNTER — OFFICE VISIT (OUTPATIENT)
Age: 79
End: 2024-04-09
Payer: MEDICARE

## 2024-04-09 VITALS
WEIGHT: 171.4 LBS | HEART RATE: 70 BPM | DIASTOLIC BLOOD PRESSURE: 82 MMHG | HEIGHT: 65 IN | TEMPERATURE: 97.1 F | RESPIRATION RATE: 16 BRPM | SYSTOLIC BLOOD PRESSURE: 144 MMHG | BODY MASS INDEX: 28.56 KG/M2 | OXYGEN SATURATION: 97 %

## 2024-04-09 DIAGNOSIS — F43.21 GRIEF: ICD-10-CM

## 2024-04-09 DIAGNOSIS — L65.9 HAIR THINNING: ICD-10-CM

## 2024-04-09 DIAGNOSIS — I10 ESSENTIAL HYPERTENSION: Primary | ICD-10-CM

## 2024-04-09 DIAGNOSIS — F41.9 ANXIETY: ICD-10-CM

## 2024-04-09 DIAGNOSIS — E78.2 MIXED HYPERLIPIDEMIA: ICD-10-CM

## 2024-04-09 PROCEDURE — G8399 PT W/DXA RESULTS DOCUMENT: HCPCS | Performed by: INTERNAL MEDICINE

## 2024-04-09 PROCEDURE — 3079F DIAST BP 80-89 MM HG: CPT | Performed by: INTERNAL MEDICINE

## 2024-04-09 PROCEDURE — 99214 OFFICE O/P EST MOD 30 MIN: CPT | Performed by: INTERNAL MEDICINE

## 2024-04-09 PROCEDURE — 1036F TOBACCO NON-USER: CPT | Performed by: INTERNAL MEDICINE

## 2024-04-09 PROCEDURE — 1123F ACP DISCUSS/DSCN MKR DOCD: CPT | Performed by: INTERNAL MEDICINE

## 2024-04-09 PROCEDURE — G8427 DOCREV CUR MEDS BY ELIG CLIN: HCPCS | Performed by: INTERNAL MEDICINE

## 2024-04-09 PROCEDURE — 1090F PRES/ABSN URINE INCON ASSESS: CPT | Performed by: INTERNAL MEDICINE

## 2024-04-09 PROCEDURE — 3077F SYST BP >= 140 MM HG: CPT | Performed by: INTERNAL MEDICINE

## 2024-04-09 PROCEDURE — G8417 CALC BMI ABV UP PARAM F/U: HCPCS | Performed by: INTERNAL MEDICINE

## 2024-04-09 RX ORDER — ESCITALOPRAM OXALATE 10 MG/1
10 TABLET ORAL DAILY
Qty: 90 TABLET | Refills: 1 | Status: SHIPPED | OUTPATIENT
Start: 2024-04-09

## 2024-04-09 ASSESSMENT — PATIENT HEALTH QUESTIONNAIRE - PHQ9
1. LITTLE INTEREST OR PLEASURE IN DOING THINGS: NOT AT ALL
SUM OF ALL RESPONSES TO PHQ QUESTIONS 1-9: 0
SUM OF ALL RESPONSES TO PHQ9 QUESTIONS 1 & 2: 0
SUM OF ALL RESPONSES TO PHQ QUESTIONS 1-9: 0
SUM OF ALL RESPONSES TO PHQ QUESTIONS 1-9: 0
2. FEELING DOWN, DEPRESSED OR HOPELESS: NOT AT ALL
SUM OF ALL RESPONSES TO PHQ QUESTIONS 1-9: 0

## 2024-04-09 ASSESSMENT — ENCOUNTER SYMPTOMS
ABDOMINAL PAIN: 0
SHORTNESS OF BREATH: 0

## 2024-04-09 NOTE — ASSESSMENT & PLAN NOTE
Borderline controlled, continue current medications, lifestyle modifications recommended, and check blood pressures at home and send for review in 2 weeks and if remaining elevated may need to adjust medications.

## 2024-04-09 NOTE — ASSESSMENT & PLAN NOTE
At goal, continue current medications and I agree with her decreasing her escitalopram to 10 mg daily.  She is continuing to grieve her son who passed in December.  She has a good support system and does not currently feel she needs counseling.

## 2024-04-09 NOTE — PATIENT INSTRUCTIONS
A Healthy Lifestyle: Care Instructions  A healthy lifestyle can help you feel good, have more energy, and stay at a weight that's healthy for you. You can share a healthy lifestyle with your friends and family. And you can do it on your own.    Eat meals with your friends or family. You could try cooking together.   Plan activities with other people. Go for a walk with a friend, try a free online fitness class, or join a sports league.     Eat a variety of healthy foods. These include fruits, vegetables, whole grains, low-fat dairy, and lean protein.   Choose healthy portions of food. You can use the Nutrition Facts label on food packages as a guide.     Eat more fruits and vegetables. You could add vegetables to sandwiches or add fruit to cereal.   Drink water when you are thirsty. Limit soda, juice, and sports drinks.     Try to exercise most days. Aim for at least 2½ hours of exercise each week.   Keep moving. Work in the garden or take your dog on a walk. Use the stairs instead of the elevator.     If you use tobacco or nicotine, try to quit. Ask your doctor about programs and medicines to help you quit.   Limit alcohol. Men should have no more than 2 drinks a day. Women should have no more than 1. For some people, no alcohol is the best choice.   Follow-up care is a key part of your treatment and safety. Be sure to make and go to all appointments, and call your doctor if you are having problems. It's also a good idea to know your test results and keep a list of the medicines you take.  Where can you learn more?  Go to https://www.[employer].net/patientEd and enter U807 to learn more about \"A Healthy Lifestyle: Care Instructions.\"  Current as of: November 14, 2022               Content Version: 13.7  © 1766-9875 [employer].   Care instructions adapted under license by [employer]. If you have questions about a medical condition or this instruction, always ask your healthcare professional.

## 2024-04-09 NOTE — PROGRESS NOTES
Sheila Marie is a 78 y.o. female who was seen in clinic today (4/9/2024).      Assessment & Plan:   Below is the assessment and plan developed based on review of pertinent history, physical exam, labs, studies, and medications.  1. Essential hypertension  Assessment & Plan:   Borderline controlled, continue current medications, lifestyle modifications recommended, and check blood pressures at home and send for review in 2 weeks and if remaining elevated may need to adjust medications.  2. Mixed hyperlipidemia  Assessment & Plan:   Unclear control, continue current medications pending work up below  Orders:  -     Lipid Panel; Future  -     Comprehensive Metabolic Panel; Future  -     CBC with Auto Differential; Future  3. Anxiety  Assessment & Plan:   At goal, continue current medications and I agree with her decreasing her escitalopram to 10 mg daily.  She is continuing to grieve her son who passed in December.  She has a good support system and does not currently feel she needs counseling.  4. Hair thinning  -     T4, Free; Future  -     TSH; Future  5. Grief     Return in about 6 months (around 10/9/2024) for MWV.   Subjective/Objective:   Sheila was seen today for Follow-up   follow up active medical problems and medication management.   Patient Active Problem List   Diagnosis    Essential hypertension    History of malignant neoplasm of ovary    Mixed hyperlipidemia    Anxiety     Taking medication with no side effects.   Exercise: walking Diet avoids salt   BP ranging: not checking recently      Current Outpatient Medications   Medication Sig Dispense Refill    estradiol (ESTRACE) 1 MG tablet TAKE 1 TABLET BY MOUTH DAILY 90 tablet 1    escitalopram (LEXAPRO) 20 MG tablet TAKE 1 TABLET BY MOUTH DAILY (Patient taking differently: Take 0.5 tablets by mouth daily) 30 tablet 2    olmesartan (BENICAR) 40 MG tablet Take 1 tablet by mouth daily 90 tablet 1    pravastatin (PRAVACHOL) 10 MG tablet Take 1 tablet by

## 2024-04-11 DIAGNOSIS — E78.2 MIXED HYPERLIPIDEMIA: ICD-10-CM

## 2024-04-11 DIAGNOSIS — L65.9 HAIR THINNING: ICD-10-CM

## 2024-04-11 LAB
ALBUMIN SERPL-MCNC: 3.6 G/DL (ref 3.5–5)
ALBUMIN/GLOB SERPL: 1 (ref 1.1–2.2)
ALP SERPL-CCNC: 69 U/L (ref 45–117)
ALT SERPL-CCNC: 23 U/L (ref 12–78)
ANION GAP SERPL CALC-SCNC: 3 MMOL/L (ref 5–15)
AST SERPL-CCNC: 20 U/L (ref 15–37)
BASOPHILS # BLD: 0.1 K/UL (ref 0–0.1)
BASOPHILS NFR BLD: 1 % (ref 0–1)
BILIRUB SERPL-MCNC: 0.6 MG/DL (ref 0.2–1)
BUN SERPL-MCNC: 15 MG/DL (ref 6–20)
BUN/CREAT SERPL: 19 (ref 12–20)
CALCIUM SERPL-MCNC: 9.7 MG/DL (ref 8.5–10.1)
CHLORIDE SERPL-SCNC: 104 MMOL/L (ref 97–108)
CHOLEST SERPL-MCNC: 236 MG/DL
CO2 SERPL-SCNC: 29 MMOL/L (ref 21–32)
CREAT SERPL-MCNC: 0.79 MG/DL (ref 0.55–1.02)
DIFFERENTIAL METHOD BLD: NORMAL
EOSINOPHIL # BLD: 0.4 K/UL (ref 0–0.4)
EOSINOPHIL NFR BLD: 5 % (ref 0–7)
ERYTHROCYTE [DISTWIDTH] IN BLOOD BY AUTOMATED COUNT: 13.3 % (ref 11.5–14.5)
GLOBULIN SER CALC-MCNC: 3.6 G/DL (ref 2–4)
GLUCOSE SERPL-MCNC: 99 MG/DL (ref 65–100)
HCT VFR BLD AUTO: 41.9 % (ref 35–47)
HDLC SERPL-MCNC: 98 MG/DL
HDLC SERPL: 2.4 (ref 0–5)
HGB BLD-MCNC: 14.5 G/DL (ref 11.5–16)
IMM GRANULOCYTES # BLD AUTO: 0 K/UL (ref 0–0.04)
IMM GRANULOCYTES NFR BLD AUTO: 0 % (ref 0–0.5)
LDLC SERPL CALC-MCNC: 117 MG/DL (ref 0–100)
LYMPHOCYTES # BLD: 2.7 K/UL (ref 0.8–3.5)
LYMPHOCYTES NFR BLD: 34 % (ref 12–49)
MCH RBC QN AUTO: 31.5 PG (ref 26–34)
MCHC RBC AUTO-ENTMCNC: 34.6 G/DL (ref 30–36.5)
MCV RBC AUTO: 91.1 FL (ref 80–99)
MONOCYTES # BLD: 0.7 K/UL (ref 0–1)
MONOCYTES NFR BLD: 9 % (ref 5–13)
NEUTS SEG # BLD: 4.1 K/UL (ref 1.8–8)
NEUTS SEG NFR BLD: 51 % (ref 32–75)
NRBC # BLD: 0 K/UL (ref 0–0.01)
NRBC BLD-RTO: 0 PER 100 WBC
PLATELET # BLD AUTO: 361 K/UL (ref 150–400)
PMV BLD AUTO: 10.4 FL (ref 8.9–12.9)
POTASSIUM SERPL-SCNC: 4.5 MMOL/L (ref 3.5–5.1)
PROT SERPL-MCNC: 7.2 G/DL (ref 6.4–8.2)
RBC # BLD AUTO: 4.6 M/UL (ref 3.8–5.2)
SODIUM SERPL-SCNC: 136 MMOL/L (ref 136–145)
T4 FREE SERPL-MCNC: 1 NG/DL (ref 0.8–1.5)
TRIGL SERPL-MCNC: 105 MG/DL
TSH SERPL DL<=0.05 MIU/L-ACNC: 0.94 UIU/ML (ref 0.36–3.74)
VLDLC SERPL CALC-MCNC: 21 MG/DL
WBC # BLD AUTO: 8 K/UL (ref 3.6–11)

## 2024-04-12 DIAGNOSIS — E78.2 MIXED HYPERLIPIDEMIA: ICD-10-CM

## 2024-04-12 DIAGNOSIS — I10 ESSENTIAL HYPERTENSION: ICD-10-CM

## 2024-04-12 RX ORDER — OLMESARTAN MEDOXOMIL 40 MG/1
40 TABLET ORAL DAILY
Qty: 90 TABLET | Refills: 1 | Status: SHIPPED | OUTPATIENT
Start: 2024-04-12

## 2024-04-12 RX ORDER — PRAVASTATIN SODIUM 10 MG
10 TABLET ORAL NIGHTLY
Qty: 90 TABLET | Refills: 3 | Status: SHIPPED | OUTPATIENT
Start: 2024-04-12

## 2024-04-18 DIAGNOSIS — I10 ESSENTIAL HYPERTENSION: ICD-10-CM

## 2024-04-18 RX ORDER — OLMESARTAN MEDOXOMIL 40 MG/1
40 TABLET ORAL DAILY
Qty: 90 TABLET | Refills: 1 | OUTPATIENT
Start: 2024-04-18

## 2024-04-27 DIAGNOSIS — I10 ESSENTIAL HYPERTENSION: ICD-10-CM

## 2024-04-29 DIAGNOSIS — I10 ESSENTIAL HYPERTENSION: ICD-10-CM

## 2024-04-29 RX ORDER — OLMESARTAN MEDOXOMIL 40 MG/1
40 TABLET ORAL DAILY
Qty: 90 TABLET | Refills: 1 | OUTPATIENT
Start: 2024-04-29

## 2024-04-29 RX ORDER — OLMESARTAN MEDOXOMIL 40 MG/1
40 TABLET ORAL DAILY
Qty: 90 TABLET | Refills: 1 | Status: CANCELLED | OUTPATIENT
Start: 2024-04-29

## 2024-04-29 RX ORDER — OLMESARTAN MEDOXOMIL 40 MG/1
40 TABLET ORAL DAILY
Qty: 90 TABLET | Refills: 1 | Status: SHIPPED | OUTPATIENT
Start: 2024-04-29

## 2024-08-27 RX ORDER — ESTRADIOL 1 MG/1
1 TABLET ORAL DAILY
Qty: 90 TABLET | Refills: 1 | Status: SHIPPED | OUTPATIENT
Start: 2024-08-27

## 2024-11-03 DIAGNOSIS — E78.2 MIXED HYPERLIPIDEMIA: ICD-10-CM

## 2024-11-04 RX ORDER — PRAVASTATIN SODIUM 10 MG
10 TABLET ORAL NIGHTLY
Qty: 90 TABLET | Refills: 1 | Status: SHIPPED | OUTPATIENT
Start: 2024-11-04

## 2024-12-03 SDOH — HEALTH STABILITY: PHYSICAL HEALTH: ON AVERAGE, HOW MANY DAYS PER WEEK DO YOU ENGAGE IN MODERATE TO STRENUOUS EXERCISE (LIKE A BRISK WALK)?: 6 DAYS

## 2024-12-03 SDOH — ECONOMIC STABILITY: INCOME INSECURITY: HOW HARD IS IT FOR YOU TO PAY FOR THE VERY BASICS LIKE FOOD, HOUSING, MEDICAL CARE, AND HEATING?: PATIENT DECLINED

## 2024-12-03 SDOH — ECONOMIC STABILITY: FOOD INSECURITY: WITHIN THE PAST 12 MONTHS, THE FOOD YOU BOUGHT JUST DIDN'T LAST AND YOU DIDN'T HAVE MONEY TO GET MORE.: PATIENT DECLINED

## 2024-12-03 SDOH — HEALTH STABILITY: PHYSICAL HEALTH: ON AVERAGE, HOW MANY MINUTES DO YOU ENGAGE IN EXERCISE AT THIS LEVEL?: 30 MIN

## 2024-12-03 SDOH — ECONOMIC STABILITY: FOOD INSECURITY: WITHIN THE PAST 12 MONTHS, YOU WORRIED THAT YOUR FOOD WOULD RUN OUT BEFORE YOU GOT MONEY TO BUY MORE.: PATIENT DECLINED

## 2024-12-03 SDOH — ECONOMIC STABILITY: TRANSPORTATION INSECURITY
IN THE PAST 12 MONTHS, HAS LACK OF TRANSPORTATION KEPT YOU FROM MEETINGS, WORK, OR FROM GETTING THINGS NEEDED FOR DAILY LIVING?: NO

## 2024-12-03 ASSESSMENT — PATIENT HEALTH QUESTIONNAIRE - PHQ9
1. LITTLE INTEREST OR PLEASURE IN DOING THINGS: NOT AT ALL
SUM OF ALL RESPONSES TO PHQ QUESTIONS 1-9: 0
2. FEELING DOWN, DEPRESSED OR HOPELESS: NOT AT ALL
SUM OF ALL RESPONSES TO PHQ9 QUESTIONS 1 & 2: 0
SUM OF ALL RESPONSES TO PHQ QUESTIONS 1-9: 0

## 2024-12-03 ASSESSMENT — LIFESTYLE VARIABLES
HOW MANY STANDARD DRINKS CONTAINING ALCOHOL DO YOU HAVE ON A TYPICAL DAY: 1
HOW OFTEN DO YOU HAVE SIX OR MORE DRINKS ON ONE OCCASION: 1
HOW OFTEN DO YOU HAVE A DRINK CONTAINING ALCOHOL: MONTHLY OR LESS
HOW MANY STANDARD DRINKS CONTAINING ALCOHOL DO YOU HAVE ON A TYPICAL DAY: 1 OR 2
HOW OFTEN DO YOU HAVE A DRINK CONTAINING ALCOHOL: 2

## 2024-12-06 ENCOUNTER — OFFICE VISIT (OUTPATIENT)
Age: 79
End: 2024-12-06
Payer: MEDICARE

## 2024-12-06 VITALS
DIASTOLIC BLOOD PRESSURE: 74 MMHG | TEMPERATURE: 98.1 F | BODY MASS INDEX: 28.89 KG/M2 | SYSTOLIC BLOOD PRESSURE: 138 MMHG | RESPIRATION RATE: 15 BRPM | HEART RATE: 73 BPM | OXYGEN SATURATION: 97 % | HEIGHT: 64 IN | WEIGHT: 169.2 LBS

## 2024-12-06 DIAGNOSIS — Z12.31 VISIT FOR SCREENING MAMMOGRAM: ICD-10-CM

## 2024-12-06 DIAGNOSIS — E78.2 MIXED HYPERLIPIDEMIA: ICD-10-CM

## 2024-12-06 DIAGNOSIS — F41.9 ANXIETY: ICD-10-CM

## 2024-12-06 DIAGNOSIS — I10 ESSENTIAL HYPERTENSION: ICD-10-CM

## 2024-12-06 DIAGNOSIS — Z00.00 MEDICARE ANNUAL WELLNESS VISIT, SUBSEQUENT: Primary | ICD-10-CM

## 2024-12-06 PROCEDURE — 99214 OFFICE O/P EST MOD 30 MIN: CPT | Performed by: INTERNAL MEDICINE

## 2024-12-06 RX ORDER — OLMESARTAN MEDOXOMIL 40 MG/1
40 TABLET ORAL DAILY
Qty: 90 TABLET | Refills: 1 | Status: SHIPPED | OUTPATIENT
Start: 2024-12-06

## 2024-12-06 RX ORDER — ESCITALOPRAM OXALATE 10 MG/1
10 TABLET ORAL DAILY
Qty: 90 TABLET | Refills: 1 | Status: SHIPPED | OUTPATIENT
Start: 2024-12-06

## 2024-12-06 ASSESSMENT — ENCOUNTER SYMPTOMS
BACK PAIN: 0
SORE THROAT: 0
SHORTNESS OF BREATH: 0
ABDOMINAL PAIN: 0
CONSTIPATION: 0
BLOOD IN STOOL: 0
DIARRHEA: 0
NAUSEA: 0
COUGH: 0

## 2024-12-06 NOTE — ACP (ADVANCE CARE PLANNING)
Advance Care Planning   The patient has the following advanced directives on file:  Advance Directives       Power of  Living Will ACP-Advance Directive ACP-Power of     Not on File Filed on 09/21/18 Filed Not on File            The patient has appointed the following active healthcare agents:    Primary Decision Maker: Aldair Marie - Spouse - [phone]    The Patient has the following current code status:    Code Status: Not on file    Visit Documentation:  Reviewed current ACP on file and no changes have been made.     Padmini Urbina MD  12/6/2024

## 2024-12-06 NOTE — ASSESSMENT & PLAN NOTE
Initially elevated but improved with recheck in the office and home blood pressures are at goal.  Continue current medication regimen

## 2024-12-06 NOTE — ASSESSMENT & PLAN NOTE
Reviewed last lab work and some mild LDL elevation.  Continue with current medication and lifestyle changes.  Plan for repeat lab work in 6 months.

## 2024-12-06 NOTE — PROGRESS NOTES
Medicare Annual Wellness Visit    Sheila Marie is here for Medicare AWV    Assessment & Plan   Medicare annual wellness visit, subsequent  Health maintenance reviewed and updated with patient today at visit.    Visit for screening mammogram  -     [facility]ANNA DIGITAL SCREEN BILATERAL; Future  Essential hypertension  Assessment & Plan:    Initially elevated but improved with recheck in the office and home blood pressures are at goal.  Continue current medication regimen  Orders:  -     olmesartan (BENICAR) 40 MG tablet; Take 1 tablet by mouth daily, Disp-90 tablet, R-1Normal  Mixed hyperlipidemia  Assessment & Plan:   Reviewed last lab work and some mild LDL elevation.  Continue with current medication and lifestyle changes.  Plan for repeat lab work in 6 months.  Anxiety  Assessment & Plan:   At goal continue current medication.    Recommendations for Preventive Services Due: see orders and patient instructions/AVS.  Recommended screening schedule for the next 5-10 years is provided to the patient in written form: see Patient Instructions/AVS.     Return in 6 months (on 6/6/2025) for FOLLOWUP.     Subjective   The following acute and/or chronic problems were also addressed today:  Patient Active Problem List   Diagnosis    Essential hypertension    History of malignant neoplasm of ovary    Mixed hyperlipidemia    Anxiety    Taking medication with no side effects.   Exercise: walking Diet not eating a lot of salt.   Home BP: 120-130's/70's.    Has had some stressors with husbands health and loss of her dog.   Patient's complete Health Risk Assessment and screening values have been reviewed and are found in Flowsheets. The following problems were reviewed today and where indicated follow up appointments were made and/or referrals ordered.    Positive Risk Factor Screenings with Interventions:                      Advanced Directives:  Do you have a Living Will?: (!) No    Intervention:  see ACP note           Review of

## 2025-01-08 ENCOUNTER — HOSPITAL ENCOUNTER (OUTPATIENT)
Facility: HOSPITAL | Age: 80
Discharge: HOME | End: 2025-01-11
Attending: INTERNAL MEDICINE
Payer: MEDICARE

## 2025-01-08 DIAGNOSIS — Z12.31 VISIT FOR SCREENING MAMMOGRAM: ICD-10-CM

## 2025-01-08 PROCEDURE — 77063 BREAST TOMOSYNTHESIS BI: CPT

## 2025-01-13 DIAGNOSIS — I10 ESSENTIAL HYPERTENSION: ICD-10-CM

## 2025-01-13 RX ORDER — ESCITALOPRAM OXALATE 10 MG/1
10 TABLET ORAL DAILY
Qty: 75 TABLET | OUTPATIENT
Start: 2025-01-13

## 2025-01-13 RX ORDER — OLMESARTAN MEDOXOMIL 40 MG/1
40 TABLET ORAL DAILY
Qty: 75 TABLET | OUTPATIENT
Start: 2025-01-13

## 2025-02-20 RX ORDER — ESTRADIOL 1 MG/1
1 TABLET ORAL DAILY
Qty: 90 TABLET | Refills: 1 | Status: SHIPPED | OUTPATIENT
Start: 2025-02-20

## 2025-05-01 DIAGNOSIS — E78.2 MIXED HYPERLIPIDEMIA: ICD-10-CM

## 2025-05-01 RX ORDER — PRAVASTATIN SODIUM 10 MG
10 TABLET ORAL NIGHTLY
Qty: 90 TABLET | Refills: 0 | Status: SHIPPED | OUTPATIENT
Start: 2025-05-01

## 2025-06-10 SDOH — ECONOMIC STABILITY: FOOD INSECURITY: WITHIN THE PAST 12 MONTHS, THE FOOD YOU BOUGHT JUST DIDN'T LAST AND YOU DIDN'T HAVE MONEY TO GET MORE.: NEVER TRUE

## 2025-06-10 SDOH — ECONOMIC STABILITY: FOOD INSECURITY: WITHIN THE PAST 12 MONTHS, YOU WORRIED THAT YOUR FOOD WOULD RUN OUT BEFORE YOU GOT MONEY TO BUY MORE.: NEVER TRUE

## 2025-06-10 SDOH — ECONOMIC STABILITY: INCOME INSECURITY: IN THE LAST 12 MONTHS, WAS THERE A TIME WHEN YOU WERE NOT ABLE TO PAY THE MORTGAGE OR RENT ON TIME?: NO

## 2025-06-10 SDOH — ECONOMIC STABILITY: TRANSPORTATION INSECURITY
IN THE PAST 12 MONTHS, HAS THE LACK OF TRANSPORTATION KEPT YOU FROM MEDICAL APPOINTMENTS OR FROM GETTING MEDICATIONS?: NO

## 2025-06-11 ENCOUNTER — OFFICE VISIT (OUTPATIENT)
Age: 80
End: 2025-06-11
Payer: MEDICARE

## 2025-06-11 VITALS
BODY MASS INDEX: 29.4 KG/M2 | DIASTOLIC BLOOD PRESSURE: 79 MMHG | TEMPERATURE: 97.5 F | HEIGHT: 64 IN | OXYGEN SATURATION: 96 % | HEART RATE: 69 BPM | WEIGHT: 172.2 LBS | SYSTOLIC BLOOD PRESSURE: 160 MMHG | RESPIRATION RATE: 16 BRPM

## 2025-06-11 DIAGNOSIS — R53.83 FATIGUE, UNSPECIFIED TYPE: ICD-10-CM

## 2025-06-11 DIAGNOSIS — E78.2 MIXED HYPERLIPIDEMIA: ICD-10-CM

## 2025-06-11 DIAGNOSIS — R40.0 DAYTIME SOMNOLENCE: ICD-10-CM

## 2025-06-11 DIAGNOSIS — F41.9 ANXIETY: ICD-10-CM

## 2025-06-11 DIAGNOSIS — I10 ESSENTIAL HYPERTENSION: Primary | ICD-10-CM

## 2025-06-11 PROCEDURE — 1036F TOBACCO NON-USER: CPT | Performed by: INTERNAL MEDICINE

## 2025-06-11 PROCEDURE — G8399 PT W/DXA RESULTS DOCUMENT: HCPCS | Performed by: INTERNAL MEDICINE

## 2025-06-11 PROCEDURE — 3077F SYST BP >= 140 MM HG: CPT | Performed by: INTERNAL MEDICINE

## 2025-06-11 PROCEDURE — G8427 DOCREV CUR MEDS BY ELIG CLIN: HCPCS | Performed by: INTERNAL MEDICINE

## 2025-06-11 PROCEDURE — 1159F MED LIST DOCD IN RCRD: CPT | Performed by: INTERNAL MEDICINE

## 2025-06-11 PROCEDURE — 99214 OFFICE O/P EST MOD 30 MIN: CPT | Performed by: INTERNAL MEDICINE

## 2025-06-11 PROCEDURE — 1090F PRES/ABSN URINE INCON ASSESS: CPT | Performed by: INTERNAL MEDICINE

## 2025-06-11 PROCEDURE — G8417 CALC BMI ABV UP PARAM F/U: HCPCS | Performed by: INTERNAL MEDICINE

## 2025-06-11 PROCEDURE — 1123F ACP DISCUSS/DSCN MKR DOCD: CPT | Performed by: INTERNAL MEDICINE

## 2025-06-11 PROCEDURE — 1126F AMNT PAIN NOTED NONE PRSNT: CPT | Performed by: INTERNAL MEDICINE

## 2025-06-11 PROCEDURE — 3078F DIAST BP <80 MM HG: CPT | Performed by: INTERNAL MEDICINE

## 2025-06-11 PROCEDURE — 1160F RVW MEDS BY RX/DR IN RCRD: CPT | Performed by: INTERNAL MEDICINE

## 2025-06-11 RX ORDER — ESCITALOPRAM OXALATE 10 MG/1
10 TABLET ORAL DAILY
Qty: 90 TABLET | Refills: 1 | Status: SHIPPED | OUTPATIENT
Start: 2025-06-11

## 2025-06-11 ASSESSMENT — ENCOUNTER SYMPTOMS
BACK PAIN: 0
BLOOD IN STOOL: 0
DIARRHEA: 0
SHORTNESS OF BREATH: 0
NAUSEA: 0
CONSTIPATION: 0
COUGH: 0
SORE THROAT: 0
ABDOMINAL PAIN: 0

## 2025-06-11 ASSESSMENT — PATIENT HEALTH QUESTIONNAIRE - PHQ9
2. FEELING DOWN, DEPRESSED OR HOPELESS: NOT AT ALL
1. LITTLE INTEREST OR PLEASURE IN DOING THINGS: NOT AT ALL
SUM OF ALL RESPONSES TO PHQ QUESTIONS 1-9: 0

## 2025-06-11 NOTE — PROGRESS NOTES
Faxed Sleep Referral to Patricia Aj @ [phone].  Received message, fax received OK.  
Bowel sounds are normal.      Palpations: Abdomen is soft.      Tenderness: There is no abdominal tenderness.   Musculoskeletal:      Right lower leg: No edema.      Left lower leg: No edema.   Neurological:      Mental Status: She is alert.   Psychiatric:         Mood and Affect: Mood normal.       Vitals:    06/11/25 1327   BP: (!) 160/79   Pulse: 69   Resp: 16   Temp: 97.5 °F (36.4 °C)   TempSrc: Temporal   SpO2: 96%   Weight: 78.1 kg (172 lb 3.2 oz)   Height: 1.631 m (5' 4.2\")        I have discussed the diagnosis with the patient and the intended plan as seen in the above orders.  The patient has received an after-visit summary and questions were answered concerning future plans.  I have discussed medication side effects and warnings with the patient as well.  She has expressed understanding of the diagnosis and plan      Aspects of this note may have been generated using voice recognition software. Despite editing, there may be some syntax errors     Padmini Urbina MD

## 2025-06-11 NOTE — ASSESSMENT & PLAN NOTE
Borderline elevation and have asked her to check blood pressures at home and send for review in 2 weeks.  Continue to work on lifestyle changes.

## 2025-06-11 NOTE — ASSESSMENT & PLAN NOTE
Due for repeat lab work to assess for goal.  Continue current medication pending results.  Tolerating medication well.

## 2025-06-12 LAB
ALBUMIN SERPL-MCNC: 3.8 G/DL (ref 3.5–5)
ALBUMIN/GLOB SERPL: 1.1 (ref 1.1–2.2)
ALP SERPL-CCNC: 79 U/L (ref 45–117)
ALT SERPL-CCNC: 23 U/L (ref 12–78)
ANION GAP SERPL CALC-SCNC: 8 MMOL/L (ref 2–12)
AST SERPL-CCNC: 22 U/L (ref 15–37)
BASOPHILS # BLD: 0.09 K/UL (ref 0–0.1)
BASOPHILS NFR BLD: 0.8 % (ref 0–1)
BILIRUB SERPL-MCNC: 0.7 MG/DL (ref 0.2–1)
BUN SERPL-MCNC: 13 MG/DL (ref 6–20)
BUN/CREAT SERPL: 18 (ref 12–20)
CALCIUM SERPL-MCNC: 9.9 MG/DL (ref 8.5–10.1)
CHLORIDE SERPL-SCNC: 99 MMOL/L (ref 97–108)
CHOLEST SERPL-MCNC: 238 MG/DL
CO2 SERPL-SCNC: 27 MMOL/L (ref 21–32)
CREAT SERPL-MCNC: 0.74 MG/DL (ref 0.55–1.02)
DIFFERENTIAL METHOD BLD: ABNORMAL
EOSINOPHIL # BLD: 0.52 K/UL (ref 0–0.4)
EOSINOPHIL NFR BLD: 4.5 % (ref 0–7)
ERYTHROCYTE [DISTWIDTH] IN BLOOD BY AUTOMATED COUNT: 13.5 % (ref 11.5–14.5)
GLOBULIN SER CALC-MCNC: 3.5 G/DL (ref 2–4)
GLUCOSE SERPL-MCNC: 90 MG/DL (ref 65–100)
HCT VFR BLD AUTO: 41.7 % (ref 35–47)
HDLC SERPL-MCNC: 101 MG/DL
HDLC SERPL: 2.4 (ref 0–5)
HGB BLD-MCNC: 14 G/DL (ref 11.5–16)
IMM GRANULOCYTES # BLD AUTO: 0.03 K/UL (ref 0–0.04)
IMM GRANULOCYTES NFR BLD AUTO: 0.3 % (ref 0–0.5)
LDLC SERPL CALC-MCNC: 112.8 MG/DL (ref 0–100)
LYMPHOCYTES # BLD: 3.8 K/UL (ref 0.8–3.5)
LYMPHOCYTES NFR BLD: 33.2 % (ref 12–49)
MCH RBC QN AUTO: 30.2 PG (ref 26–34)
MCHC RBC AUTO-ENTMCNC: 33.6 G/DL (ref 30–36.5)
MCV RBC AUTO: 89.9 FL (ref 80–99)
MONOCYTES # BLD: 0.94 K/UL (ref 0–1)
MONOCYTES NFR BLD: 8.2 % (ref 5–13)
NEUTS SEG # BLD: 6.07 K/UL (ref 1.8–8)
NEUTS SEG NFR BLD: 53 % (ref 32–75)
NRBC # BLD: 0 K/UL (ref 0–0.01)
NRBC BLD-RTO: 0 PER 100 WBC
PLATELET # BLD AUTO: 369 K/UL (ref 150–400)
PMV BLD AUTO: 10.9 FL (ref 8.9–12.9)
POTASSIUM SERPL-SCNC: 4.5 MMOL/L (ref 3.5–5.1)
PROT SERPL-MCNC: 7.3 G/DL (ref 6.4–8.2)
RBC # BLD AUTO: 4.64 M/UL (ref 3.8–5.2)
SODIUM SERPL-SCNC: 134 MMOL/L (ref 136–145)
T4 FREE SERPL-MCNC: 1 NG/DL (ref 0.8–1.5)
TRIGL SERPL-MCNC: 121 MG/DL
TSH SERPL DL<=0.05 MIU/L-ACNC: 0.95 UIU/ML (ref 0.36–3.74)
VLDLC SERPL CALC-MCNC: 24.2 MG/DL
WBC # BLD AUTO: 11.5 K/UL (ref 3.6–11)

## 2025-06-18 ENCOUNTER — PATIENT MESSAGE (OUTPATIENT)
Age: 80
End: 2025-06-18

## 2025-06-18 ENCOUNTER — RESULTS FOLLOW-UP (OUTPATIENT)
Age: 80
End: 2025-06-18

## 2025-06-18 DIAGNOSIS — D72.829 LEUKOCYTOSIS, UNSPECIFIED TYPE: Primary | ICD-10-CM

## 2025-07-12 DIAGNOSIS — I10 ESSENTIAL HYPERTENSION: ICD-10-CM

## 2025-07-14 RX ORDER — OLMESARTAN MEDOXOMIL 40 MG/1
40 TABLET ORAL DAILY
Qty: 90 TABLET | Refills: 1 | Status: SHIPPED | OUTPATIENT
Start: 2025-07-14

## 2025-07-27 DIAGNOSIS — E78.2 MIXED HYPERLIPIDEMIA: ICD-10-CM

## 2025-07-28 RX ORDER — PRAVASTATIN SODIUM 10 MG
TABLET ORAL
Qty: 90 TABLET | Refills: 0 | Status: SHIPPED | OUTPATIENT
Start: 2025-07-28

## 2025-07-28 NOTE — TELEPHONE ENCOUNTER
Chief Complaint   Patient presents with    Medication Refill     Last Appointment with Dr. Padmini Urbina:  6/11/2025   Future Appointments   Date Time Provider Department Center   12/11/2025  2:30 PM Padmini Urbina MD [facility] DEP   2/19/2026  1:00 PM Padmini Urbina MD [facility] DEP       The above orders were approved via [facility] per Dr. Padmini Urbina.

## 2025-08-18 RX ORDER — ESTRADIOL 1 MG/1
1 TABLET ORAL DAILY
Qty: 90 TABLET | Refills: 0 | Status: SHIPPED | OUTPATIENT
Start: 2025-08-18

## (undated) DEVICE — Z DISCONTINUED USE 2751540 TUBING IRRIG L10IN DISP PMP ENDOGATOR

## (undated) DEVICE — SET EXTN TBNG L BOR 4 W STPCOCK ST 32IN PRIMING VOL 6ML

## (undated) DEVICE — AIRLIFE™ U/CONNECT-IT OXYGEN TUBING 7 FEET (2.1 M) CRUSH-RESISTANT OXYGEN TUBING, VINYL TIPPED: Brand: AIRLIFE™

## (undated) DEVICE — NEEDLE HYPO 18GA L1.5IN PNK S STL HUB POLYPR SHLD REG BVL

## (undated) DEVICE — BAG BELONG PT PERS CLEAR HANDL

## (undated) DEVICE — SET ADMIN 16ML TBNG L100IN 2 Y INJ SITE IV PIGGY BK DISP

## (undated) DEVICE — ENDO CARRY-ON PROCEDURE KIT INCLUDES ENZYMATIC SPONGE, GAUZE, BIOHAZARD LABEL, TRAY, LUBRICANT, DIRTY SCOPE LABEL, WATER LABEL, TRAY, DRAWSTRING PAD, AND DEFENDO 4-PIECE KIT.: Brand: ENDO CARRY-ON PROCEDURE KIT

## (undated) DEVICE — SOLIDIFIER FLUID 3000 CC ABSORB

## (undated) DEVICE — CONNECTOR TBNG AUX H2O JET DISP FOR OLY 160/180 SER

## (undated) DEVICE — KENDALL RADIOLUCENT FOAM MONITORING ELECTRODE -RECTANGULAR SHAPE: Brand: KENDALL

## (undated) DEVICE — BW-412T DISP COMBO CLEANING BRUSH: Brand: SINGLE USE COMBINATION CLEANING BRUSH

## (undated) DEVICE — SYRINGE MED 20ML STD CLR PLAS LUERLOCK TIP N CTRL DISP

## (undated) DEVICE — 1200 GUARD II KIT W/5MM TUBE W/O VAC TUBE: Brand: GUARDIAN

## (undated) DEVICE — CATH IV AUTOGRD BC BLU 22GA 25 -- INSYTE

## (undated) DEVICE — QUILTED PREMIUM COMFORT UNDERPAD,EXTRA HEAVY: Brand: WINGS

## (undated) DEVICE — KIT IV STRT W CHLORAPREP PD 1ML

## (undated) DEVICE — Device